# Patient Record
Sex: MALE | Race: WHITE | NOT HISPANIC OR LATINO | Employment: OTHER | URBAN - METROPOLITAN AREA
[De-identification: names, ages, dates, MRNs, and addresses within clinical notes are randomized per-mention and may not be internally consistent; named-entity substitution may affect disease eponyms.]

---

## 2017-06-14 ENCOUNTER — DOCUMENTATION ONLY (OUTPATIENT)
Dept: LAB | Facility: CLINIC | Age: 77
End: 2017-06-14

## 2017-06-14 DIAGNOSIS — I10 HYPERTENSION GOAL BP (BLOOD PRESSURE) < 140/90: ICD-10-CM

## 2017-06-14 DIAGNOSIS — Z80.42 FAMILY HISTORY OF PROSTATE CANCER: ICD-10-CM

## 2017-06-14 DIAGNOSIS — Z12.5 SPECIAL SCREENING FOR MALIGNANT NEOPLASM OF PROSTATE: ICD-10-CM

## 2017-06-14 DIAGNOSIS — N18.30 CKD (CHRONIC KIDNEY DISEASE) STAGE 3, GFR 30-59 ML/MIN (H): Primary | ICD-10-CM

## 2017-06-14 NOTE — PROGRESS NOTES
This patient has a future lab only appointment on 06/27/2017 and needs orders. Please sign the pended labs taken from the overdue  and make any needed changes. Thanks julia

## 2017-06-27 DIAGNOSIS — N18.30 CKD (CHRONIC KIDNEY DISEASE) STAGE 3, GFR 30-59 ML/MIN (H): ICD-10-CM

## 2017-06-27 DIAGNOSIS — I10 HYPERTENSION GOAL BP (BLOOD PRESSURE) < 140/90: ICD-10-CM

## 2017-06-27 DIAGNOSIS — Z80.42 FAMILY HISTORY OF PROSTATE CANCER: ICD-10-CM

## 2017-06-27 DIAGNOSIS — Z12.5 SPECIAL SCREENING FOR MALIGNANT NEOPLASM OF PROSTATE: ICD-10-CM

## 2017-06-27 PROCEDURE — G0103 PSA SCREENING: HCPCS | Performed by: FAMILY MEDICINE

## 2017-06-27 PROCEDURE — 36415 COLL VENOUS BLD VENIPUNCTURE: CPT | Performed by: FAMILY MEDICINE

## 2017-06-27 PROCEDURE — 80061 LIPID PANEL: CPT | Performed by: FAMILY MEDICINE

## 2017-06-27 PROCEDURE — 80048 BASIC METABOLIC PNL TOTAL CA: CPT | Performed by: FAMILY MEDICINE

## 2017-06-28 ENCOUNTER — TELEPHONE (OUTPATIENT)
Dept: FAMILY MEDICINE | Facility: CLINIC | Age: 77
End: 2017-06-28

## 2017-06-28 DIAGNOSIS — N18.30 CKD (CHRONIC KIDNEY DISEASE) STAGE 3, GFR 30-59 ML/MIN (H): Primary | ICD-10-CM

## 2017-06-28 LAB
ANION GAP SERPL CALCULATED.3IONS-SCNC: 6 MMOL/L (ref 3–14)
BUN SERPL-MCNC: 17 MG/DL (ref 7–30)
CALCIUM SERPL-MCNC: 8.9 MG/DL (ref 8.5–10.1)
CHLORIDE SERPL-SCNC: 104 MMOL/L (ref 94–109)
CHOLEST SERPL-MCNC: 179 MG/DL
CO2 SERPL-SCNC: 27 MMOL/L (ref 20–32)
CREAT SERPL-MCNC: 1.38 MG/DL (ref 0.66–1.25)
GFR SERPL CREATININE-BSD FRML MDRD: 50 ML/MIN/1.7M2
GLUCOSE SERPL-MCNC: 83 MG/DL (ref 70–99)
HDLC SERPL-MCNC: 71 MG/DL
LDLC SERPL CALC-MCNC: 96 MG/DL
NONHDLC SERPL-MCNC: 108 MG/DL
POTASSIUM SERPL-SCNC: 4.4 MMOL/L (ref 3.4–5.3)
PSA SERPL-ACNC: 0.62 UG/L (ref 0–4)
SODIUM SERPL-SCNC: 137 MMOL/L (ref 133–144)
TRIGL SERPL-MCNC: 60 MG/DL

## 2017-06-28 NOTE — TELEPHONE ENCOUNTER
There was a future order in orders pending for a BMP that did not get done yesterday. Please add to the labs that were done    Richmond Albarran MD

## 2017-07-06 ENCOUNTER — TRANSFERRED RECORDS (OUTPATIENT)
Dept: HEALTH INFORMATION MANAGEMENT | Facility: CLINIC | Age: 77
End: 2017-07-06

## 2017-10-06 ENCOUNTER — ALLIED HEALTH/NURSE VISIT (OUTPATIENT)
Dept: NURSING | Facility: CLINIC | Age: 77
End: 2017-10-06
Payer: COMMERCIAL

## 2017-10-06 DIAGNOSIS — Z23 NEED FOR PROPHYLACTIC VACCINATION AND INOCULATION AGAINST INFLUENZA: Primary | ICD-10-CM

## 2017-10-06 PROCEDURE — 99207 ZZC NO CHARGE NURSE ONLY: CPT

## 2017-10-06 PROCEDURE — 90662 IIV NO PRSV INCREASED AG IM: CPT

## 2017-10-06 PROCEDURE — G0008 ADMIN INFLUENZA VIRUS VAC: HCPCS

## 2017-10-06 NOTE — MR AVS SNAPSHOT
After Visit Summary   10/6/2017    Sami Harmon    MRN: [id number]           Patient Information     Date Of Birth          1940        Visit Information        Provider Department      10/6/2017 1:25 PM NE FLU CLINIC [facility]        Today's Diagnoses     Need for prophylactic vaccination and inoculation against influenza    -  1       Follow-ups after your visit        Who to contact     If you have questions or need follow up information about today's clinic visit or your schedule please contact [facility] directly at [phone].  Normal or non-critical lab and imaging results will be communicated to you by Level Chefhart, letter or phone within 4 business days after the clinic has received the results. If you do not hear from us within 7 days, please contact the clinic through Aspiring Mind[employer] or phone. If you have a critical or abnormal lab result, we will notify you by phone as soon as possible.  Submit refill requests through [email or web address] or call your pharmacy and they will forward the refill request to us. Please allow 3 business days for your refill to be completed.          Additional Information About Your Visit        MyChart Information     [email or web address] gives you secure access to your electronic health record. If you see a primary care provider, you can also send messages to your care team and make appointments. If you have questions, please call your primary care clinic.  If you do not have a primary care provider, please call [phone] and they will assist you.        Care EveryWhere ID     This is your Care EveryWhere ID. This could be used by other organizations to access your [facility] medical records  [id number]         Blood Pressure from Last 3 Encounters:   12/13/16 115/62   06/29/16 132/72   06/08/16 136/80    Weight from Last 3 Encounters:   12/13/16 247 lb (112 kg)   06/29/16 256 lb 2 oz (116.2 kg)   06/08/16 257 lb (116.6 kg)              We  Performed the Following     ADMIN INFLUENZA (For MEDICARE Patients ONLY) []     FLU VACCINE, INCREASED ANTIGEN, PRESV FREE, AGE 65+ [98347]        Primary Care Provider Office Phone # Fax #    Clemente Albarran [phone] [address] [facility] [address]        Equal Access to Services     BALDEMAR PEDERSON : Hadii aad ku hadasho Soomaali, waaxda luqadaha, qaybta kaalmada adeegyada, waxjosefina vila hayaan adealfredo kharabri riggs . So [facility] [phone].    ATENCIÓN: Si habla español, tiene a salazar disposición servicios gratuitos de asistencia lingüística. Llame al [phone].    We comply with applicable federal civil rights laws and [place] laws. We do not discriminate on the basis of race, color, national origin, age, disability, sex, sexual orientation, or gender identity.            Thank you!     Thank you for choosing [facility]  for your care. Our goal is always to provide you with excellent care. Hearing back from our patients is one way we can continue to improve our services. Please take a few minutes to complete the written survey that you may receive in the mail after your visit with us. Thank you!             Your Updated Medication List - Protect others around you: Learn how to safely use, store and throw away your medicines at www.disposemymeds.org.          This list is accurate as of: 10/6/17  1:28 PM.  Always use your most recent med list.                   Brand Name Dispense Instructions for use Diagnosis    aspirin 81 MG tablet      1 TABLET DAILY        IRON SUPPLEMENT PO      Take by mouth daily        losartan 100 MG tablet    COZAAR    90 tablet    Take 1 tablet (100 mg) by mouth daily    CKD (chronic kidney disease) stage 3, GFR 30-59 ml/min, Hypertension goal BP (blood pressure) < 140/90       lovastatin 40 MG tablet    MEVACOR    90 tablet    Take 1 tablet (40 mg) by mouth At Bedtime    Hyperlipidemia LDL goal <100       Lutein 6 MG Caps       take  by mouth.        METAMUCIL PO      Take by mouth daily 1 teaspoon added to 1 glass of water once daily        MULTI-VITAMIN PO      1 tablets  daily        omega 3 1000 MG Caps      take 1 capsule by mouth 2 times daily.

## 2017-10-06 NOTE — PROGRESS NOTES
Injectable Influenza Immunization Documentation    1.  Is the person to be vaccinated sick today?   No    2. Does the person to be vaccinated have an allergy to a component   of the vaccine?   No    3. Has the person to be vaccinated ever had a serious reaction   to influenza vaccine in the past?   No    4. Has the person to be vaccinated ever had Guillain-Barré syndrome?   No    Form completed by Love Saleem CMA ([facility])

## 2017-10-06 NOTE — NURSING NOTE
Prior to injection verified patient identity using patient's name and date of birth.    Love Saleem CMA ([facility])

## 2017-11-08 ENCOUNTER — TRANSFERRED RECORDS (OUTPATIENT)
Dept: HEALTH INFORMATION MANAGEMENT | Facility: CLINIC | Age: 77
End: 2017-11-08

## 2017-11-16 ENCOUNTER — DOCUMENTATION ONLY (OUTPATIENT)
Dept: LAB | Facility: CLINIC | Age: 77
End: 2017-11-16

## 2017-11-16 DIAGNOSIS — N18.30 CKD (CHRONIC KIDNEY DISEASE) STAGE 3, GFR 30-59 ML/MIN (H): Primary | ICD-10-CM

## 2017-11-16 DIAGNOSIS — I10 HYPERTENSION GOAL BP (BLOOD PRESSURE) < 140/90: ICD-10-CM

## 2017-11-16 NOTE — PROGRESS NOTES
This patient has a future lab only appointment on 12/07/2017 and needs orders. Please sign the pended labs taken from the overdue  and make any needed changes. Thanks julia

## 2017-12-07 DIAGNOSIS — N18.30 CKD (CHRONIC KIDNEY DISEASE) STAGE 3, GFR 30-59 ML/MIN (H): ICD-10-CM

## 2017-12-07 DIAGNOSIS — I10 HYPERTENSION GOAL BP (BLOOD PRESSURE) < 140/90: ICD-10-CM

## 2017-12-07 LAB
ANION GAP SERPL CALCULATED.3IONS-SCNC: 6 MMOL/L (ref 3–14)
BUN SERPL-MCNC: 14 MG/DL (ref 7–30)
CALCIUM SERPL-MCNC: 9 MG/DL (ref 8.5–10.1)
CHLORIDE SERPL-SCNC: 101 MMOL/L (ref 94–109)
CO2 SERPL-SCNC: 30 MMOL/L (ref 20–32)
CREAT SERPL-MCNC: 1.36 MG/DL (ref 0.66–1.25)
CREAT UR-MCNC: 65 MG/DL
GFR SERPL CREATININE-BSD FRML MDRD: 51 ML/MIN/1.7M2
GLUCOSE SERPL-MCNC: 82 MG/DL (ref 70–99)
HGB BLD-MCNC: 11.9 G/DL (ref 13.3–17.7)
MICROALBUMIN UR-MCNC: <5 MG/L
MICROALBUMIN/CREAT UR: NORMAL MG/G CR (ref 0–17)
POTASSIUM SERPL-SCNC: 4.2 MMOL/L (ref 3.4–5.3)
SODIUM SERPL-SCNC: 137 MMOL/L (ref 133–144)

## 2017-12-07 PROCEDURE — 85018 HEMOGLOBIN: CPT | Performed by: FAMILY MEDICINE

## 2017-12-07 PROCEDURE — 82043 UR ALBUMIN QUANTITATIVE: CPT | Performed by: FAMILY MEDICINE

## 2017-12-07 PROCEDURE — 80048 BASIC METABOLIC PNL TOTAL CA: CPT | Performed by: FAMILY MEDICINE

## 2017-12-07 PROCEDURE — 36415 COLL VENOUS BLD VENIPUNCTURE: CPT | Performed by: FAMILY MEDICINE

## 2017-12-14 ENCOUNTER — OFFICE VISIT (OUTPATIENT)
Dept: FAMILY MEDICINE | Facility: CLINIC | Age: 77
End: 2017-12-14
Payer: COMMERCIAL

## 2017-12-14 VITALS
BODY MASS INDEX: 33.32 KG/M2 | SYSTOLIC BLOOD PRESSURE: 114 MMHG | DIASTOLIC BLOOD PRESSURE: 70 MMHG | TEMPERATURE: 97.7 F | HEART RATE: 64 BPM | WEIGHT: 246 LBS | HEIGHT: 72 IN

## 2017-12-14 DIAGNOSIS — I10 HYPERTENSION GOAL BP (BLOOD PRESSURE) < 140/90: ICD-10-CM

## 2017-12-14 DIAGNOSIS — N18.30 CKD (CHRONIC KIDNEY DISEASE) STAGE 3, GFR 30-59 ML/MIN (H): ICD-10-CM

## 2017-12-14 DIAGNOSIS — Z00.00 ROUTINE HISTORY AND PHYSICAL EXAMINATION OF ADULT: Primary | ICD-10-CM

## 2017-12-14 DIAGNOSIS — E78.5 HYPERLIPIDEMIA LDL GOAL <100: ICD-10-CM

## 2017-12-14 PROCEDURE — 99397 PER PM REEVAL EST PAT 65+ YR: CPT | Performed by: FAMILY MEDICINE

## 2017-12-14 RX ORDER — LOSARTAN POTASSIUM 100 MG/1
100 TABLET ORAL DAILY
Qty: 90 TABLET | Refills: 3 | Status: SHIPPED | OUTPATIENT
Start: 2017-12-14 | End: 2018-12-31

## 2017-12-14 RX ORDER — LOVASTATIN 40 MG
40 TABLET ORAL AT BEDTIME
Qty: 90 TABLET | Refills: 3 | Status: SHIPPED | OUTPATIENT
Start: 2017-12-14 | End: 2018-12-31

## 2017-12-14 NOTE — PATIENT INSTRUCTIONS
Preventive Health Recommendations:       Male Ages 65 and over    Yearly exam:             See your health care provider every year in order to  o   Review health changes.   o   Discuss preventive care.    o   Review your medicines if your doctor has prescribed any.    Talk with your health care provider about whether you should have a test to screen for prostate cancer (PSA).    Every 3 years, have a diabetes test (fasting glucose). If you are at risk for diabetes, you should have this test more often.    Every 5 years, have a cholesterol test. Have this test more often if you are at risk for high cholesterol or heart disease.     Every 10 years, have a colonoscopy. Or, have a yearly FIT test (stool test). These exams will check for colon cancer.    Talk to with your health care provider about screening for Abdominal Aortic Aneurysm if you have a family history of AAA or have a history of smoking.  Shots:     Get a flu shot each year.     Get a tetanus shot every 10 years.     Talk to your doctor about your pneumonia vaccines. There are now two you should receive - Pneumovax (PPSV 23) and Prevnar (PCV 13).    Talk to your doctor about a shingles vaccine.     Talk to your doctor about the hepatitis B vaccine.  Nutrition:     Eat at least 5 servings of fruits and vegetables each day.     Eat whole-grain bread, whole-wheat pasta and brown rice instead of white grains and rice.     Talk to your doctor about Calcium and Vitamin D.   Lifestyle    Exercise for at least 150 minutes a week (30 minutes a day, 5 days a week). This will help you control your weight and prevent disease.     Limit alcohol to one drink per day.     No smoking.     Wear sunscreen to prevent skin cancer.     See your dentist every six months for an exam and cleaning.     See your eye doctor every 1 to 2 years to screen for conditions such as glaucoma, macular degeneration and cataracts.    -There's a new shingles vaccine coming out and you should  get it when available. You should check to see if this new vaccine is covered by insurance. You can get it at any pharmacy including ours.

## 2017-12-14 NOTE — PROGRESS NOTES
SUBJECTIVE:   Sami Harmon is a 77 year old male who presents for Preventive Visit.      Are you in the first 12 months of your Medicare coverage?  No    Physical   Annual:     Getting at least 3 servings of Calcium per day::  Yes    Bi-annual eye exam::  Yes    Dental care twice a year::  Yes    Sleep apnea or symptoms of sleep apnea::  None    Frequency of exercise::  4-5 days/week    Duration of exercise::  Greater than 60 minutes    Taking medications regularly::  Yes    Medication side effects::  None    Additional concerns today::  YES              Ability to successfully perform activities of daily living: Yes, no assistance needed  Home safety:  none identified   Hearing impairment: Yes, Hearing aid in left ear, surgery in 2002      Recent blood workup reviewed with patient. Patient concerned about 0.4 point drop of Hgb from 12.3 (12/07/2016)  down to 11.9        Past/recent records reviewed and discussed for -- immunizations (shingles).      Fall risk:  Fallen 2 or more times in the past year?: No  Any fall with injury in the past year?: No    click delete button to remove this line now  COGNITIVE SCREEN  1) Repeat 3 items (Banana, Sunrise, Chair)    2) Clock draw: NORMAL  3) 3 item recall: Recalls 3 objects  Results: 3 items recalled: COGNITIVE IMPAIRMENT LESS LIKELY    Mini-CogTM Copyright S Siva. Licensed by the author for use in [facility]; reprinted with permission (janeth@.[email or web address]). All rights reserved.          Reviewed and updated as needed this visit by clinical staffTobacco  Allergies  Meds  Med Hx  Surg Hx  Fam Hx  Soc Hx        Reviewed and updated as needed this visit by Provider        Social History   Substance Use Topics     Smoking status: Passive Smoke Exposure - Never Smoker     Years: 30.00     Types: Dip, chew, snus or snuff     Start date: 6/1/1960     Last attempt to quit: 8/1/1990     Smokeless tobacco: Former User      Comment: 1 cigar daily     Alcohol use Yes       Comment: 1 drink per day       No flowsheet data found.No flowsheet data found.                Today's PHQ-2 Score: PHQ-2 ( 1999 Pfizer) 12/14/2017   Q1: Little interest or pleasure in doing things 0   Q2: Feeling down, depressed or hopeless 0   PHQ-2 Score 0   Q1: Little interest or pleasure in doing things -   Q2: Feeling down, depressed or hopeless -   PHQ-2 Score -       Do you feel safe in your environment - Yes    Do you have a Health Care Directive?: Yes: Advance Directive has been received and scanned.    Current providers sharing in care for this patient include:   Patient Care Team:  Clemente Albarran MD as PCP - General (Family Practice)    The following health maintenance items are reviewed in Epic and correct as of today:  Health Maintenance   Topic Date Due     FALL RISK ASSESSMENT  12/13/2017     LIPID MONITORING Q1 YEAR  06/27/2018     PSA Q1 YR  06/27/2018     BMP Q1 YR  12/07/2018     HEMOGLOBIN Q1 YR  12/07/2018     MICROALBUMIN Q1 YEAR  12/07/2018     ADVANCE DIRECTIVE PLANNING Q5 YRS  07/30/2019     TETANUS IMMUNIZATION (SYSTEM ASSIGNED)  10/10/2022     COLONOSCOPY Q5 YR  11/08/2022     INFLUENZA VACCINE (SYSTEM ASSIGNED)  Completed     PNEUMOCOCCAL  Completed     Labs reviewed in EPIC  BP Readings from Last 3 Encounters:   12/14/17 114/70   12/13/16 115/62   06/29/16 132/72    Wt Readings from Last 3 Encounters:   12/14/17 111.6 kg (246 lb)   12/13/16 112 kg (247 lb)   06/29/16 116.2 kg (256 lb 2 oz)                  Patient Active Problem List   Diagnosis     Family history of prostate cancer     Hyperlipidemia LDL goal <100     CKD (chronic kidney disease) stage 3, GFR 30-59 ml/min     Advance care planning     Primary localized osteoarthrosis, pelvic region and thigh     History of colon polyps     Advanced directives, counseling/discussion     Hypertension goal BP (blood pressure) < 140/90     Overweight     Past Surgical History:   Procedure Laterality Date     ABDOMEN SURGERY   1970's, 1980's    Two hernia surgerys     BIOPSY  July 2016    polyp removed from vocal cord - benign     COLONOSCOPY       COSMETIC SURGERY       EYE SURGERY  2000    Macular scar, presently being treated with avastin     HERNIA REPAIR, INGUINAL RT/LT  1970,1980     ORTHOPEDIC SURGERY  April 2015, June 2015    Right hip replacement, Left hip replacement     SURGICAL HISTORY OF -   2000    cholesteatoma removal L     SURGICAL HISTORY OF -   3/2004    laser macular     SURGICAL HISTORY OF -   4-2015    right hip replacement     TONSILLECTOMY & ADENOIDECTOMY  1961       Social History   Substance Use Topics     Smoking status: Passive Smoke Exposure - Never Smoker     Years: 30.00     Types: Dip, chew, snus or snuff     Start date: 6/1/1960     Last attempt to quit: 8/1/1990     Smokeless tobacco: Former User      Comment: 1 cigar daily     Alcohol use Yes      Comment: 1 drink per day     Family History   Problem Relation Age of Onset     Arthritis Mother      Eye Disorder Mother      DIABETES Mother      type 2     Hypertension Mother      Cancer - colorectal Mother      CEREBROVASCULAR DISEASE Mother      OSTEOPOROSIS Mother      Obesity Mother      Colon Cancer Mother      HEART DISEASE Father      Prostate Cancer Father      CEREBROVASCULAR DISEASE Father      HEART DISEASE Brother      Prostate Cancer Brother      GASTROINTESTINAL DISEASE Son          Current Outpatient Prescriptions   Medication Sig Dispense Refill     lovastatin (MEVACOR) 40 MG tablet Take 1 tablet (40 mg) by mouth At Bedtime Refill when requested 90 tablet 3     losartan (COZAAR) 100 MG tablet Take 1 tablet (100 mg) by mouth daily Refill when requested 90 tablet 3     Ferrous Sulfate (IRON SUPPLEMENT PO) Take by mouth daily        Psyllium (METAMUCIL PO) Take by mouth daily 1 teaspoon added to 1 glass of water once daily       LUTEIN 6 MG CAPS take  by mouth.       OMEGA 3 1000 MG OR CAPS take 1 capsule by mouth 2 times daily.       ASPIRIN  "81 MG OR TABS 1 TABLET DAILY       MULTI-VITAMIN OR 1 tablets  daily       [DISCONTINUED] lovastatin (MEVACOR) 40 MG tablet Take 1 tablet (40 mg) by mouth At Bedtime 90 tablet 3     [DISCONTINUED] losartan (COZAAR) 100 MG tablet Take 1 tablet (100 mg) by mouth daily 90 tablet 3     No Known Allergies  Recent Labs   Lab Test  12/07/17   0745  06/27/17   0743   05/18/16   0841   04/01/15   0902   09/28/11   0824   09/15/10   1159   LDL   --   96   --   81   --   73   < >   --    < >  75   HDL   --   71   --   64   --   74   < >   --    < >  45   TRIG   --   60   --   56   --   48   < >   --    < >  65   ALT   --    --    --    --    --    --    --   21   --   23   CR  1.36*  1.38*   < >  1.49*   < >  1.31*   < >   --    < >  1.24   GFRESTIMATED  51*  50*   < >  46*   < >  53*   < >   --    < >  57*   GFRESTBLACK  61  60*   < >  55*   < >  64   < >   --    < >  70   POTASSIUM  4.2  4.4   < >  4.4   < >  4.6   < >   --    < >  4.2    < > = values in this interval not displayed.              Review of Systems  Constitutional, HEENT, cardiovascular, pulmonary, GI, , musculoskeletal, neuro, skin, endocrine and psych systems are negative, except as otherwise noted.    This document serves as a record of the services and decisions personally performed and made by Richmond Albarran MD. It was created on their behalf by Dada Coto, a trained medical scribe. The creation of this document is based the provider's statements to the medical scribe.  Dada Coto December 14, 2017 12:34 PM         OBJECTIVE:   /70 (Cuff Size: Adult Large)  Pulse 64  Temp 97.7  F (36.5  C) (Oral)  Ht 5' 11.5\" (1.816 m)  Wt 246 lb (111.6 kg)  BMI 33.83 kg/m2 Estimated body mass index is 33.83 kg/(m^2) as calculated from the following:    Height as of this encounter: 5' 11.5\" (1.816 m).    Weight as of this encounter: 246 lb (111.6 kg).  Physical Exam  GENERAL: healthy, alert and no distress  EYES: Eyes grossly normal to inspection, PERRL and " conjunctivae and sclerae normal  HENT: ear canals and TM's normal, nose and mouth without ulcers or lesions  NECK: no adenopathy, no asymmetry, masses, or scars and thyroid normal to palpation  RESP: lungs clear to auscultation - no rales, rhonchi or wheezes  CV: regular rate and rhythm, normal S1 S2, no S3 or S4, no murmur, click or rub, no peripheral edema and peripheral pulses strong  ABDOMEN: soft, nontender, no hepatosplenomegaly, no masses and bowel sounds normal  MS: no gross musculoskeletal defects noted, no edema  SKIN: no suspicious lesions or rashes -- small skin lesion on right forearm, treated with LNx3  NEURO: Normal strength and tone, mentation intact and speech normal  PSYCH: mentation appears normal, affect normal/bright  LYMPH: no cervical, supraclavicular, axillary, or inguinal adenopathy    ASSESSMENT / PLAN:   (Z00.00) Routine history and physical examination of adult  (primary encounter diagnosis)  Comment: Patient doing well, no complaints verbalized. Screening recommendations related to health maintenance, age demographics, and family history were discussed  Plan: Recommended getting new shingles vaccine when available.     (E78.5) Hyperlipidemia LDL goal <100  Comment: previous LDL of 96; controlled  Plan: lovastatin (MEVACOR) 40 MG tablet        -medications refilled, continue present medications    (N18.3) CKD (chronic kidney disease) stage 3, GFR 30-59 ml/min  Comment: Cr of 1.36; stable. Pt concerned about mild anemia with Hgb of 11.9, most likely related to decreased EPO due to overlying CKD. Reassurance provided.   Plan: losartan (COZAAR) 100 MG tablet        -medications refilled, continue present medications    (I10) Hypertension goal BP (blood pressure) < 140/90  Comment: BP of 114/70; controlled  Plan: losartan (COZAAR) 100 MG tablet        -medications refilled, continue present medications      End of Life Planning:  Patient currently has an advanced directive: not discussed  "today    COUNSELING:  Reviewed preventive health counseling, as reflected in patient instructions       Regular exercise       Healthy diet/nutrition       Shingles vaccine        Estimated body mass index is 33.83 kg/(m^2) as calculated from the following:    Height as of this encounter: 5' 11.5\" (1.816 m).    Weight as of this encounter: 246 lb (111.6 kg).  Weight management plan: Discussed healthy diet and exercise guidelines and patient will follow up in 12 months in clinic to re-evaluate.   reports that he is a non-smoker but has been exposed to tobacco smoke. The exposure started about 57 years ago. He has been exposed to tobacco smoke for the past 30.00 years. He has quit using smokeless tobacco.      Appropriate preventive services were discussed with this patient, including applicable screening as appropriate for cardiovascular disease, diabetes, osteopenia/osteoporosis, and glaucoma.  As appropriate for age/gender, discussed screening for colorectal cancer, prostate cancer, breast cancer, and cervical cancer. Checklist reviewing preventive services available has been given to the patient.    Reviewed patients plan of care and provided an AVS. The Basic Care Plan (routine screening as documented in Health Maintenance) for Sami meets the Care Plan requirement. This Care Plan has been established and reviewed with the Patient.    Counseling Resources:  ATP IV Guidelines  Pooled Cohorts Equation Calculator  Breast Cancer Risk Calculator  FRAX Risk Assessment  ICSI Preventive Guidelines  Dietary Guidelines for Americans, 2010  USDA's MyPlate  ASA Prophylaxis  Lung CA Screening    Clemente Albarran MD, MD  [facility]  Answers for HPI/ROS submitted by the patient on 12/11/2017   PHQ-2 Score: 0    "

## 2017-12-14 NOTE — MR AVS SNAPSHOT
After Visit Summary   12/14/2017    Sami Harmon    MRN: [id number]           Patient Information     Date Of Birth          1940        Visit Information        Provider Department      12/14/2017 11:20 AM Clemente Albarran MD [facility]        Today's Diagnoses     Routine history and physical examination of adult    -  1    Hyperlipidemia LDL goal <100        CKD (chronic kidney disease) stage 3, GFR 30-59 ml/min        Hypertension goal BP (blood pressure) < 140/90          Care Instructions      Preventive Health Recommendations:       Male Ages 65 and over    Yearly exam:             See your health care provider every year in order to  o   Review health changes.   o   Discuss preventive care.    o   Review your medicines if your doctor has prescribed any.    Talk with your health care provider about whether you should have a test to screen for prostate cancer (PSA).    Every 3 years, have a diabetes test (fasting glucose). If you are at risk for diabetes, you should have this test more often.    Every 5 years, have a cholesterol test. Have this test more often if you are at risk for high cholesterol or heart disease.     Every 10 years, have a colonoscopy. Or, have a yearly FIT test (stool test). These exams will check for colon cancer.    Talk to with your health care provider about screening for Abdominal Aortic Aneurysm if you have a family history of AAA or have a history of smoking.  Shots:     Get a flu shot each year.     Get a tetanus shot every 10 years.     Talk to your doctor about your pneumonia vaccines. There are now two you should receive - Pneumovax (PPSV 23) and Prevnar (PCV 13).    Talk to your doctor about a shingles vaccine.     Talk to your doctor about the hepatitis B vaccine.  Nutrition:     Eat at least 5 servings of fruits and vegetables each day.     Eat whole-grain bread, whole-wheat pasta and brown rice instead of white grains and rice.      Talk to your doctor about Calcium and Vitamin D.   Lifestyle    Exercise for at least 150 minutes a week (30 minutes a day, 5 days a week). This will help you control your weight and prevent disease.     Limit alcohol to one drink per day.     No smoking.     Wear sunscreen to prevent skin cancer.     See your dentist every six months for an exam and cleaning.     See your eye doctor every 1 to 2 years to screen for conditions such as glaucoma, macular degeneration and cataracts.    -There's a new shingles vaccine coming out and you should get it when available. You should check to see if this new vaccine is covered by insurance. You can get it at any pharmacy including ours.           Follow-ups after your visit        Who to contact     If you have questions or need follow up information about today's clinic visit or your schedule please contact [facility] directly at [phone].  Normal or non-critical lab and imaging results will be communicated to you by MyChart, letter or phone within 4 business days after the clinic has received the results. If you do not hear from us within 7 days, please contact the clinic through [email or web address] or phone. If you have a critical or abnormal lab result, we will notify you by phone as soon as possible.  Submit refill requests through [employer] or call your pharmacy and they will forward the refill request to us. Please allow 3 business days for your refill to be completed.          Additional Information About Your Visit        MyChart Information     [employer] gives you secure access to your electronic health record. If you see a primary care provider, you can also send messages to your care team and make appointments. If you have questions, please call your primary care clinic.  If you do not have a primary care provider, please call [phone] and they will assist you.        Care EveryWhere ID     This is your Care EveryWhere ID. This could be used by other  "organizations to access your [facility] medical records  [id number]        Your Vitals Were     Pulse Temperature Height BMI (Body Mass Index)          64 97.7  F (36.5  C) (Oral) 5' 11.5\" (1.816 m) 33.83 kg/m2         Blood Pressure from Last 3 Encounters:   12/14/17 114/70   12/13/16 115/62   06/29/16 132/72    Weight from Last 3 Encounters:   12/14/17 246 lb (111.6 kg)   12/13/16 247 lb (112 kg)   06/29/16 256 lb 2 oz (116.2 kg)              Today, you had the following     No orders found for display         Today's Medication Changes          These changes are accurate as of: 12/14/17  2:45 PM.  If you have any questions, ask your nurse or doctor.               These medicines have changed or have updated prescriptions.        Dose/Directions    losartan 100 MG tablet   Commonly known as:  COZAAR   This may have changed:  additional instructions   Used for:  CKD (chronic kidney disease) stage 3, GFR 30-59 ml/min, Hypertension goal BP (blood pressure) < 140/90   Changed by:  Clemente Albarran MD        Dose:  100 mg   Take 1 tablet (100 mg) by mouth daily Refill when requested   Quantity:  90 tablet   Refills:  3       lovastatin 40 MG tablet   Commonly known as:  MEVACOR   This may have changed:  additional instructions   Used for:  Hyperlipidemia LDL goal <100   Changed by:  Clemente Albarran MD        Dose:  40 mg   Take 1 tablet (40 mg) by mouth At Bedtime Refill when requested   Quantity:  90 tablet   Refills:  3            Where to get your medicines      These medications were sent to [facility] [place] [id number]     Phone:  [phone]     losartan 100 MG tablet    lovastatin 40 MG tablet                Primary Care Provider Office Phone # Fax #    Clemente Albarran [phone]       [address] [place] [address]        Equal Access to Services     BALDEMAR PEDERSON AH: Maurice islas " garrick Yang, waleliada luqadaha, qaybta kaalmada flacohever, waxjosefina marcellusin hayaarhina samalfredo quocever keely martina. So [facility] [phone].    ATENCIÓN: Si habla aleshia, tiene a salazar disposición servicios gratuitos de asistencia lingüística. Gigi al [phone].    We comply with applicable federal civil rights laws and [place] laws. We do not discriminate on the basis of race, color, national origin, age, disability, sex, sexual orientation, or gender identity.            Thank you!     Thank you for choosing [facility]  for your care. Our goal is always to provide you with excellent care. Hearing back from our patients is one way we can continue to improve our services. Please take a few minutes to complete the written survey that you may receive in the mail after your visit with us. Thank you!             Your Updated Medication List - Protect others around you: Learn how to safely use, store and throw away your medicines at www.disposemymeds.org.          This list is accurate as of: 12/14/17  2:45 PM.  Always use your most recent med list.                   Brand Name Dispense Instructions for use Diagnosis    aspirin 81 MG tablet      1 TABLET DAILY        IRON SUPPLEMENT PO      Take by mouth daily        losartan 100 MG tablet    COZAAR    90 tablet    Take 1 tablet (100 mg) by mouth daily Refill when requested    CKD (chronic kidney disease) stage 3, GFR 30-59 ml/min, Hypertension goal BP (blood pressure) < 140/90       lovastatin 40 MG tablet    MEVACOR    90 tablet    Take 1 tablet (40 mg) by mouth At Bedtime Refill when requested    Hyperlipidemia LDL goal <100       Lutein 6 MG Caps      take  by mouth.        METAMUCIL PO      Take by mouth daily 1 teaspoon added to 1 glass of water once daily        MULTI-VITAMIN PO      1 tablets  daily        omega 3 1000 MG Caps      take 1 capsule by mouth 2 times daily.

## 2018-06-13 ENCOUNTER — DOCUMENTATION ONLY (OUTPATIENT)
Dept: LAB | Facility: CLINIC | Age: 78
End: 2018-06-13

## 2018-06-13 DIAGNOSIS — Z80.42 FAMILY HISTORY OF PROSTATE CANCER: Primary | ICD-10-CM

## 2018-06-13 DIAGNOSIS — I10 HYPERTENSION GOAL BP (BLOOD PRESSURE) < 140/90: ICD-10-CM

## 2018-06-13 DIAGNOSIS — E78.5 HYPERLIPIDEMIA LDL GOAL <100: ICD-10-CM

## 2018-06-13 DIAGNOSIS — N18.30 CKD (CHRONIC KIDNEY DISEASE) STAGE 3, GFR 30-59 ML/MIN (H): ICD-10-CM

## 2018-06-13 DIAGNOSIS — Z12.5 SPECIAL SCREENING FOR MALIGNANT NEOPLASM OF PROSTATE: ICD-10-CM

## 2018-06-13 NOTE — PROGRESS NOTES
This patient has a future lab only appointment on 06/21/2017 and needs orders. Please sign the pended labs taken from the overdue  and make any needed changes. Thanks julia

## 2018-06-21 DIAGNOSIS — Z12.5 SPECIAL SCREENING FOR MALIGNANT NEOPLASM OF PROSTATE: ICD-10-CM

## 2018-06-21 DIAGNOSIS — Z80.42 FAMILY HISTORY OF PROSTATE CANCER: ICD-10-CM

## 2018-06-21 DIAGNOSIS — E78.5 HYPERLIPIDEMIA LDL GOAL <100: ICD-10-CM

## 2018-06-21 DIAGNOSIS — I10 HYPERTENSION GOAL BP (BLOOD PRESSURE) < 140/90: ICD-10-CM

## 2018-06-21 DIAGNOSIS — N18.30 CKD (CHRONIC KIDNEY DISEASE) STAGE 3, GFR 30-59 ML/MIN (H): ICD-10-CM

## 2018-06-21 LAB
ANION GAP SERPL CALCULATED.3IONS-SCNC: 9 MMOL/L (ref 3–14)
BUN SERPL-MCNC: 16 MG/DL (ref 7–30)
CALCIUM SERPL-MCNC: 8.8 MG/DL (ref 8.5–10.1)
CHLORIDE SERPL-SCNC: 103 MMOL/L (ref 94–109)
CHOLEST SERPL-MCNC: 165 MG/DL
CO2 SERPL-SCNC: 25 MMOL/L (ref 20–32)
CREAT SERPL-MCNC: 1.35 MG/DL (ref 0.66–1.25)
GFR SERPL CREATININE-BSD FRML MDRD: 51 ML/MIN/1.7M2
GLUCOSE SERPL-MCNC: 86 MG/DL (ref 70–99)
HDLC SERPL-MCNC: 70 MG/DL
LDLC SERPL CALC-MCNC: 86 MG/DL
NONHDLC SERPL-MCNC: 95 MG/DL
POTASSIUM SERPL-SCNC: 4.2 MMOL/L (ref 3.4–5.3)
PSA SERPL-ACNC: 0.84 UG/L (ref 0–4)
SODIUM SERPL-SCNC: 137 MMOL/L (ref 133–144)
TRIGL SERPL-MCNC: 45 MG/DL

## 2018-06-21 PROCEDURE — 80061 LIPID PANEL: CPT | Performed by: FAMILY MEDICINE

## 2018-06-21 PROCEDURE — G0103 PSA SCREENING: HCPCS | Performed by: FAMILY MEDICINE

## 2018-06-21 PROCEDURE — 36415 COLL VENOUS BLD VENIPUNCTURE: CPT | Performed by: FAMILY MEDICINE

## 2018-06-21 PROCEDURE — 80048 BASIC METABOLIC PNL TOTAL CA: CPT | Performed by: FAMILY MEDICINE

## 2018-10-17 ENCOUNTER — ALLIED HEALTH/NURSE VISIT (OUTPATIENT)
Dept: NURSING | Facility: CLINIC | Age: 78
End: 2018-10-17
Payer: COMMERCIAL

## 2018-10-17 DIAGNOSIS — Z23 NEED FOR PROPHYLACTIC VACCINATION AND INOCULATION AGAINST INFLUENZA: Primary | ICD-10-CM

## 2018-10-17 PROCEDURE — 99207 ZZC NO CHARGE NURSE ONLY: CPT

## 2018-10-17 PROCEDURE — 90662 IIV NO PRSV INCREASED AG IM: CPT

## 2018-10-17 PROCEDURE — G0008 ADMIN INFLUENZA VIRUS VAC: HCPCS

## 2018-10-17 NOTE — MR AVS SNAPSHOT
After Visit Summary   10/17/2018    Sami Harmon    MRN: [id number]           Patient Information     Date Of Birth          1940        Visit Information        Provider Department      10/17/2018 10:30 AM NE FLU CLINIC [facility]        Today's Diagnoses     Need for prophylactic vaccination and inoculation against influenza    -  1       Follow-ups after your visit        Your next 10 appointments already scheduled     Oct 17, 2018 10:30 AM CDT   Nurse Only with NE FLU [facility] ([facility])    [address] [place] [id number] [phone]              Who to contact     If you have questions or need follow up information about today's clinic visit or your schedule please contact [facility] directly at [phone].  Normal or non-critical lab and imaging results will be communicated to you by MyChart, letter or phone within 4 business days after the clinic has received the results. If you do not hear from us within 7 days, please contact the clinic through MyChart or phone. If you have a critical or abnormal lab result, we will notify you by phone as soon as possible.  Submit refill requests through [employer] or call your pharmacy and they will forward the refill request to us. Please allow 3 business days for your refill to be completed.          Additional Information About Your Visit        MyChart Information     [employer] gives you secure access to your electronic health record. If you see a primary care provider, you can also send messages to your care team and make appointments. If you have questions, please call your primary care clinic.  If you do not have a primary care provider, please call [phone] and they will assist you.        Care EveryWhere ID     This is your Care EveryWhere ID. This could be used by other organizations to access your [facility]  records  [id number]         Blood Pressure from Last 3 Encounters:   12/14/17 114/70   12/13/16 115/62   06/29/16 132/72    Weight from Last 3 Encounters:   12/14/17 246 lb (111.6 kg)   12/13/16 247 lb (112 kg)   06/29/16 256 lb 2 oz (116.2 kg)              We Performed the Following     ADMIN INFLUENZA (For MEDICARE Patients ONLY) []     FLU VACCINE, INCREASED ANTIGEN, PRESV FREE, AGE 65+ [20915]        Primary Care Provider Office Phone # Fax #    Clemente Samir Albarran [phone]       [address] [facility] [address]        Equal Access to Services     BALDEMAR PEDERSON : Maurice Yang, wadevi arriaga, qaybta kaalmada karuna, vandana mack. So [facility] [phone].    ATENCIÓN: Si habla español, tiene a salazar disposición servicios gratuitos de asistencia lingüística. Llame al [phone].    We comply with applicable federal civil rights laws and [place] laws. We do not discriminate on the basis of race, color, national origin, age, disability, sex, sexual orientation, or gender identity.            Thank you!     Thank you for choosing [facility]  for your care. Our goal is always to provide you with excellent care. Hearing back from our patients is one way we can continue to improve our services. Please take a few minutes to complete the written survey that you may receive in the mail after your visit with us. Thank you!             Your Updated Medication List - Protect others around you: Learn how to safely use, store and throw away your medicines at www.disposemymeds.org.          This list is accurate as of 10/17/18 10:26 AM.  Always use your most recent med list.                   Brand Name Dispense Instructions for use Diagnosis    aspirin 81 MG tablet      1 TABLET DAILY        IRON SUPPLEMENT PO      Take by mouth daily        losartan 100 MG tablet    COZAAR    90 tablet    Take 1 tablet (100 mg) by mouth  daily Refill when requested    CKD (chronic kidney disease) stage 3, GFR 30-59 ml/min (H), Hypertension goal BP (blood pressure) < 140/90       lovastatin 40 MG tablet    MEVACOR    90 tablet    Take 1 tablet (40 mg) by mouth At Bedtime Refill when requested    Hyperlipidemia LDL goal <100       Lutein 6 MG Caps      take  by mouth.        METAMUCIL PO      Take by mouth daily 1 teaspoon added to 1 glass of water once daily        MULTI-VITAMIN PO      1 tablets  daily        omega 3 1000 MG Caps      take 1 capsule by mouth 2 times daily.

## 2018-12-09 ENCOUNTER — MYC MEDICAL ADVICE (OUTPATIENT)
Dept: FAMILY MEDICINE | Facility: CLINIC | Age: 78
End: 2018-12-09

## 2018-12-10 ENCOUNTER — TELEPHONE (OUTPATIENT)
Dept: FAMILY MEDICINE | Facility: CLINIC | Age: 78
End: 2018-12-10

## 2018-12-10 NOTE — TELEPHONE ENCOUNTER
Sami Harmon is a 78 year old male who calls with heart rate problems.    NURSING ASSESSMENT:  Description:  Takes BP regularly, and machine will tell him when his rate is irregular.  He has had this in the past, but has been occurring more frequently lately.  He has had EKG and workup in the past.  He reports when occurs, can feel it in his pulse, but denies palpitations/fluttering, SOB, chest pain, dizziness, etc.  He denies very high or very low heart rate, just notices the rhythm irregularity. He has been exercising and able to perform his normal routines.  The episodes seem to be random with nothing really improving or worsening symptoms. He takes losartan and lovastatin as directed.   Onset/duration:  Months, but more frequent lately.   Precip. factors:  NA  Associated symptoms:  See above  Improves/worsens symptoms:  See above  Pain scale (0-10)   0/10  Allergies: No Known Allergies    NURSING PLAN: Nursing advice to patient See provider.     RECOMMENDED DISPOSITION:  See in 24 hours - Made appt with Dr. Albarran for tomorrow and instructed on sx that would warrant ED visit, such as chest/jaw/arm pain, SOB, dizziness with understanding voiced.   Will comply with recommendation: Yes  If further questions/concerns or if symptoms do not improve, worsen or new symptoms develop, call your PCP or [facility] as soon as possible.      Guideline used:  Telephone Triage Protocols for Nurses, Fifth Edition, JOVANNI Caballero RN

## 2018-12-11 ENCOUNTER — OFFICE VISIT (OUTPATIENT)
Dept: FAMILY MEDICINE | Facility: CLINIC | Age: 78
End: 2018-12-11
Payer: COMMERCIAL

## 2018-12-11 VITALS
SYSTOLIC BLOOD PRESSURE: 128 MMHG | BODY MASS INDEX: 33.46 KG/M2 | TEMPERATURE: 98.2 F | HEART RATE: 74 BPM | WEIGHT: 247 LBS | DIASTOLIC BLOOD PRESSURE: 76 MMHG | HEIGHT: 72 IN

## 2018-12-11 DIAGNOSIS — I48.91 ATRIAL FIBRILLATION, UNSPECIFIED TYPE (H): Primary | ICD-10-CM

## 2018-12-11 LAB
ERYTHROCYTE [DISTWIDTH] IN BLOOD BY AUTOMATED COUNT: 13.6 % (ref 10–15)
HCT VFR BLD AUTO: 36.4 % (ref 40–53)
HGB BLD-MCNC: 12.4 G/DL (ref 13.3–17.7)
MCH RBC QN AUTO: 35.2 PG (ref 26.5–33)
MCHC RBC AUTO-ENTMCNC: 34.1 G/DL (ref 31.5–36.5)
MCV RBC AUTO: 103 FL (ref 78–100)
PLATELET # BLD AUTO: 188 10E9/L (ref 150–450)
RBC # BLD AUTO: 3.52 10E12/L (ref 4.4–5.9)
WBC # BLD AUTO: 4.5 10E9/L (ref 4–11)

## 2018-12-11 PROCEDURE — 99214 OFFICE O/P EST MOD 30 MIN: CPT | Performed by: FAMILY MEDICINE

## 2018-12-11 PROCEDURE — 84439 ASSAY OF FREE THYROXINE: CPT | Performed by: FAMILY MEDICINE

## 2018-12-11 PROCEDURE — 85027 COMPLETE CBC AUTOMATED: CPT | Performed by: FAMILY MEDICINE

## 2018-12-11 PROCEDURE — 80048 BASIC METABOLIC PNL TOTAL CA: CPT | Performed by: FAMILY MEDICINE

## 2018-12-11 PROCEDURE — 93000 ELECTROCARDIOGRAM COMPLETE: CPT | Performed by: FAMILY MEDICINE

## 2018-12-11 PROCEDURE — 84443 ASSAY THYROID STIM HORMONE: CPT | Performed by: FAMILY MEDICINE

## 2018-12-11 PROCEDURE — 36415 COLL VENOUS BLD VENIPUNCTURE: CPT | Performed by: FAMILY MEDICINE

## 2018-12-11 ASSESSMENT — MIFFLIN-ST. JEOR: SCORE: 1860.44

## 2018-12-11 NOTE — PATIENT INSTRUCTIONS
Orders Placed This Encounter     TSH with free T4 reflex     Last Lab Result: No results found for: TSH     CBC with platelets     Last Lab Result: Hemoglobin (g/dL)       Date                     Value                 12/07/2017               11.9 (L)         ----------     Basic metabolic panel     CARDIOLOGY EVAL ADULT REFERRAL     Referral Type:   CV Cardio consult     Number of Visits Requested:   1     Scheduling Appointments for ECHO  [phone] [place] imaging scheduling    [facility]  Call: [phone]

## 2018-12-11 NOTE — PROGRESS NOTES
"  SUBJECTIVE:   Sami Harmon is a 78 year old male who presents to clinic today for the following health issues:    Yesterday patient was triaged as follows    Patient reports some improvement today. Still noticing slight issues at times.     NURSING ASSESSMENT:  Description:  Takes BP regularly, and machine will tell him when his rate is irregular.  He has had this in the past, but has been occurring more frequently lately.  He has had EKG and workup in the past.  He reports when occurs, can feel it in his pulse, but denies palpitations/fluttering, SOB, chest pain, dizziness, etc.  He denies very high or very low heart rate, just notices the rhythm irregularity. He has been exercising and able to perform his normal routines.  The episodes seem to be random with nothing really improving or worsening symptoms. He takes losartan and lovastatin as directed.   Onset/duration:  Months, but more frequent lately.        Problem list and histories reviewed & adjusted, as indicated.  Additional history: as documented    BP Readings from Last 3 Encounters:   12/11/18 128/76   12/14/17 114/70   12/13/16 115/62    Wt Readings from Last 3 Encounters:   12/11/18 112 kg (247 lb)   12/14/17 111.6 kg (246 lb)   12/13/16 112 kg (247 lb)                    Reviewed and updated as needed this visit by clinical staff       Reviewed and updated as needed this visit by Provider         ROS:  Constitutional, HEENT, cardiovascular, pulmonary, gi and gu systems are negative, except as otherwise noted.    OBJECTIVE:     /76   Pulse 74   Temp 98.2  F (36.8  C) (Oral)   Ht 1.816 m (5' 11.5\")   Wt 112 kg (247 lb)   BMI 33.97 kg/m    Body mass index is 33.97 kg/m .   GENERAL: healthy, alert and no distress  NECK: no adenopathy, no asymmetry, masses, or scars and thyroid normal to palpation  RESP: lungs clear to auscultation - no rales, rhonchi or wheezes  CV: irregularly irregular rhythm, no murmur, click or rub and no peripheral " edema  ABDOMEN: soft, nontender, no hepatosplenomegaly, no masses and bowel sounds normal  MS: no gross musculoskeletal defects noted, no edema  SKIN: no suspicious lesions or rashes  Neuro: normal no focal deficits  Diagnostic Test Results:    EKG A-fib    CHADS=2      Results for orders placed or performed in visit on 12/11/18 (from the past 24 hour(s))   CBC with platelets   Result Value Ref Range    WBC 4.5 4.0 - 11.0 10e9/L    RBC Count 3.52 (L) 4.4 - 5.9 10e12/L    Hemoglobin 12.4 (L) 13.3 - 17.7 g/dL    Hematocrit 36.4 (L) 40.0 - 53.0 %     (H) 78 - 100 fl    MCH 35.2 (H) 26.5 - 33.0 pg    MCHC 34.1 31.5 - 36.5 g/dL    RDW 13.6 10.0 - 15.0 %    Platelet Count 188 150 - 450 10e9/L       ASSESSMENT:       PLAN:     (I48.91) Atrial fibrillation, unspecified type (H)  Comment: new onset, CHADS =2 for age and HTN  Plan: Echocardiogram Complete, TSH with free T4         reflex, CBC with platelets, Basic metabolic         panel, CARDIOLOGY EVAL ADULT REFERRAL,         rivaroxaban ANTICOAGULANT (XARELTO) 15 MG TABS         tablet                  Patient Instructions     Orders Placed This Encounter     TSH with free T4 reflex     Last Lab Result: No results found for: TSH     CBC with platelets     Last Lab Result: Hemoglobin (g/dL)       Date                     Value                 12/07/2017               11.9 (L)         ----------     Basic metabolic panel     CARDIOLOGY EVAL ADULT REFERRAL     Referral Type:   CV Cardio consult     Number of Visits Requested:   1     Scheduling Appointments for ECHO  [phone] [place] imaging scheduling    [facility]  Call: [phone]            Clemente Albarran MD, MD  [facility]

## 2018-12-12 ENCOUNTER — MYC MEDICAL ADVICE (OUTPATIENT)
Dept: FAMILY MEDICINE | Facility: CLINIC | Age: 78
End: 2018-12-12

## 2018-12-12 ENCOUNTER — TELEPHONE (OUTPATIENT)
Dept: FAMILY MEDICINE | Facility: CLINIC | Age: 78
End: 2018-12-12

## 2018-12-12 DIAGNOSIS — I48.91 ATRIAL FIBRILLATION (H): Primary | ICD-10-CM

## 2018-12-12 DIAGNOSIS — I48.91 ATRIAL FIBRILLATION (H): ICD-10-CM

## 2018-12-12 DIAGNOSIS — E03.9 HYPOTHYROIDISM, UNSPECIFIED TYPE: ICD-10-CM

## 2018-12-12 DIAGNOSIS — I48.91 ATRIAL FIBRILLATION, UNSPECIFIED TYPE (H): ICD-10-CM

## 2018-12-12 DIAGNOSIS — E03.9 HYPOTHYROIDISM, UNSPECIFIED TYPE: Primary | ICD-10-CM

## 2018-12-12 LAB
ANION GAP SERPL CALCULATED.3IONS-SCNC: 6 MMOL/L (ref 3–14)
BUN SERPL-MCNC: 13 MG/DL (ref 7–30)
CALCIUM SERPL-MCNC: 8.9 MG/DL (ref 8.5–10.1)
CHLORIDE SERPL-SCNC: 102 MMOL/L (ref 94–109)
CO2 SERPL-SCNC: 27 MMOL/L (ref 20–32)
CREAT SERPL-MCNC: 1.4 MG/DL (ref 0.66–1.25)
GFR SERPL CREATININE-BSD FRML MDRD: 49 ML/MIN/1.7M2
GLUCOSE SERPL-MCNC: 87 MG/DL (ref 70–99)
POTASSIUM SERPL-SCNC: 4.8 MMOL/L (ref 3.4–5.3)
SODIUM SERPL-SCNC: 135 MMOL/L (ref 133–144)
T4 FREE SERPL-MCNC: 0.36 NG/DL (ref 0.76–1.46)
TSH SERPL DL<=0.005 MIU/L-ACNC: 53.67 MU/L (ref 0.4–4)

## 2018-12-12 RX ORDER — LEVOTHYROXINE SODIUM 50 UG/1
50 TABLET ORAL DAILY
Qty: 30 TABLET | Refills: 11 | Status: SHIPPED | OUTPATIENT
Start: 2018-12-12 | End: 2018-12-31

## 2018-12-12 RX ORDER — LEVOTHYROXINE SODIUM 50 UG/1
50 TABLET ORAL DAILY
Qty: 90 TABLET | Refills: 3 | Status: SHIPPED | OUTPATIENT
Start: 2018-12-12 | End: 2018-12-12

## 2018-12-12 NOTE — TELEPHONE ENCOUNTER
These scripts had just been sent for a year worth by PCP today, but in 3 month increments, so RN resent for 1 month increments as requested.  MyChart sent.  Will close encounter at this time.    Sarahi Peralta RN

## 2018-12-12 NOTE — TELEPHONE ENCOUNTER
Forward to PCP to review just to be sure I'm not missing anything.  Patient had several labs completed yesterday.  Based on results, does he need any repeat labs?  Otherwise, per health maintenance it looks like the only outstanding lab due is urine microalbumin (pended).    Mynor Gonzalez RN

## 2018-12-12 NOTE — TELEPHONE ENCOUNTER
Called and reviewed.    Hypothyroid. Recommend starting medication    Also creat slightly higher will change from Xarelto to Eliquis    No orders of the defined types were placed in this encounter.    Reviewed with pharmacy    Orders Placed This Encounter     levothyroxine (SYNTHROID/LEVOTHROID) 50 MCG tablet     Sig: Take 1 tablet (50 mcg) by mouth daily     Dispense:  90 tablet     Refill:  3     apixaban ANTICOAGULANT (ELIQUIS) 2.5 MG tablet     Sig: Take 1 tablet (2.5 mg) by mouth 2 times daily     Dispense:  180 tablet     Refill:  3     Recheck TSH 6 months

## 2018-12-12 NOTE — TELEPHONE ENCOUNTER
"Per conversation with Dr. Albarran adjusting Eliquis dose up to 5mg twice daily.  Dose reduction is recommended if \"Renal impairment (nonvalvular atrial fibrillation): 2.5 mg orally twice daily in patients with at least 2 of the following characteristics, age 80 years or older, body weight 60 kg or less, or serum creatinine 1.5 mg/dL (133 mcmol/L) or higher\".  Patient only meets one of these criteria (>80 years old).  CrCl is 1.4 and patient weighs >60kg.    Angie Mejia PharmD, [facility]  Pharmacist Manager   [facility]  [phone]          "

## 2018-12-17 ENCOUNTER — ANCILLARY PROCEDURE (OUTPATIENT)
Dept: CARDIOLOGY | Facility: CLINIC | Age: 78
End: 2018-12-17
Attending: FAMILY MEDICINE
Payer: COMMERCIAL

## 2018-12-17 DIAGNOSIS — I48.91 ATRIAL FIBRILLATION, UNSPECIFIED TYPE (H): ICD-10-CM

## 2018-12-17 PROCEDURE — 93306 TTE W/DOPPLER COMPLETE: CPT | Performed by: INTERNAL MEDICINE

## 2018-12-21 ENCOUNTER — ANCILLARY PROCEDURE (OUTPATIENT)
Dept: CARDIOLOGY | Facility: CLINIC | Age: 78
End: 2018-12-21
Attending: INTERNAL MEDICINE
Payer: COMMERCIAL

## 2018-12-21 ENCOUNTER — OFFICE VISIT (OUTPATIENT)
Dept: CARDIOLOGY | Facility: CLINIC | Age: 78
End: 2018-12-21
Payer: COMMERCIAL

## 2018-12-21 VITALS
SYSTOLIC BLOOD PRESSURE: 130 MMHG | WEIGHT: 247 LBS | OXYGEN SATURATION: 97 % | HEART RATE: 77 BPM | DIASTOLIC BLOOD PRESSURE: 78 MMHG | BODY MASS INDEX: 33.97 KG/M2

## 2018-12-21 DIAGNOSIS — I48.0 PAROXYSMAL ATRIAL FIBRILLATION (H): Primary | ICD-10-CM

## 2018-12-21 DIAGNOSIS — E78.2 MIXED HYPERLIPIDEMIA: ICD-10-CM

## 2018-12-21 DIAGNOSIS — I10 BENIGN ESSENTIAL HYPERTENSION: ICD-10-CM

## 2018-12-21 DIAGNOSIS — I48.0 PAROXYSMAL ATRIAL FIBRILLATION (H): ICD-10-CM

## 2018-12-21 PROCEDURE — 0296T ZIO PATCH HOLTER ADULT PEDIATRIC GREATER THAN 48 HRS: CPT | Performed by: INTERNAL MEDICINE

## 2018-12-21 PROCEDURE — 99204 OFFICE O/P NEW MOD 45 MIN: CPT | Performed by: INTERNAL MEDICINE

## 2018-12-21 PROCEDURE — 0298T ZZC EXT ECG > 48HR TO 21 DAY REVIEW AND INTERPRETATN: CPT | Performed by: INTERNAL MEDICINE

## 2018-12-21 PROCEDURE — 93000 ELECTROCARDIOGRAM COMPLETE: CPT | Performed by: INTERNAL MEDICINE

## 2018-12-21 RX ORDER — METOPROLOL TARTRATE 25 MG/1
12.5 TABLET, FILM COATED ORAL 2 TIMES DAILY
Qty: 90 TABLET | Refills: 1 | Status: SHIPPED | OUTPATIENT
Start: 2018-12-21 | End: 2019-01-23

## 2018-12-21 NOTE — NURSING NOTE
"Chief Complaint   Patient presents with     Atrial Fib     Echo. 12/17/18. Per patient no bothersome symptoms at this time other then BP. readings       Initial /78 (BP Location: Left arm, Patient Position: Sitting, Cuff Size: Adult Large)   Pulse 77   Wt 112 kg (247 lb)   SpO2 97%   BMI 33.97 kg/m   Estimated body mass index is 33.97 kg/m  as calculated from the following:    Height as of 12/11/18: 1.816 m (5' 11.5\").    Weight as of this encounter: 112 kg (247 lb)..  BP completed using cuff size: large  Ekg.test performed today per Provider order after patient Ekg. Education relayed to patient,then Ekg. Result handed to provider for review.  Ivette MATIASP.N.  14 days ZioXT applied to patient today. Instructions on use and removal given and mail back instructions discussed with patient. All questions answered. Zio Device registered with [email or web address] via internet.   Device number: I48.0    Ivette MATIASPMjN.                Ivette Chavez L.P.N.    "

## 2018-12-21 NOTE — PATIENT INSTRUCTIONS
Thank you for coming to the [facility] @ [facility]; please note the following instructions:    1.To start Metoprolol Tartrate 25 mg, Take 12.5 mg twice daily    2. To wear a heart monitor ZIO patch for 14 days     3. 3 months follow-up with Dr Cornelius       If you have any questions regarding your visit please contact your care team:     Cardiology  Telephone Number   Brigid MORGAN., RN  Adrienne PABON, RN   Kasey MATIAS, AVEL SOOD, MILADIS SHEEHAN, CHAVAN   [phone]    *After hours: [phone]   For scheduling appts:     [phone] or    [phone] (select option 1)    *After hours: [phone]     For the Device Clinic (Pacemakers and ICD's)  RN's :  Gali Proctor   During business hours: [phone]    *After business hours:  [phone] (select option 4)      Normal test result notifications will be released via Nanoference or mailed within 7 business days.  All other test results, will be communicated via telephone once reviewed by your cardiologist.    If you need a medication refill please contact your pharmacy.  Please allow 3 business days for your refill to be completed.    As always, thank you for trusting us with your health care needs!        Patient Education     Metoprolol tablets  Brand Name: Lopressor  What is this medicine?  METOPROLOL (me TOE proe lole) is a beta-blocker. Beta-blockers reduce the workload on the heart and help it to beat more regularly. This medicine is used to treat high blood pressure and to prevent chest pain. It is also used to after a heart attack and to prevent an additional heart attack from occurring.  How should I use this medicine?  Take this medicine by mouth with a drink of water. Follow the directions on the prescription label. Take this medicine immediately after meals. Take your doses at regular intervals. Do not take more medicine than directed. Do not stop taking this medicine suddenly. This could lead to serious heart-related effects.  Talk  to your pediatrician regarding the use of this medicine in children. Special care may be needed.  What side effects may I notice from receiving this medicine?  Side effects that you should report to your doctor or health care professional as soon as possible:    allergic reactions like skin rash, itching or hives    cold or numb hands or feet    depression    difficulty breathing    faint    fever with sore throat    irregular heartbeat, chest pain    rapid weight gain    swollen legs or ankles  Side effects that usually do not require medical attention (report to your doctor or health care professional if they continue or are bothersome):    anxiety or nervousness    change in sex drive or performance    dry skin    headache    nightmares or trouble sleeping    short term memory loss    stomach upset or diarrhea    unusually tired  What may interact with this medicine?  This medicine may interact with the following medications:    certain medicines for blood pressure, heart disease, irregular heart beat    certain medicines for depression like monoamine oxidase (MAO) inhibitors, fluoxetine, or paroxetine    clonidine    dobutamine    epinephrine    isoproterenol    reserpine  What if I miss a dose?  If you miss a dose, take it as soon as you can. If it is almost time for your next dose, take only that dose. Do not take double or extra doses.  Where should I keep my medicine?  Keep out of the reach of children.  Store at room temperature between 15 and 30 degrees C (59 and 86 degrees F). Throw away any unused medicine after the expiration date.  What should I tell my health care provider before I take this medicine?  They need to know if you have any of these conditions:    diabetes    heart or vessel disease like slow heart rate, worsening heart failure, heart block, sick sinus syndrome or Raynaud's disease    kidney disease    liver disease    lung or breathing disease, like asthma or  emphysema    pheochromocytoma    thyroid disease    an unusual or allergic reaction to metoprolol, other beta-blockers, medicines, foods, dyes, or preservatives    pregnant or trying to get pregnant    breast-feeding  What should I watch for while using this medicine?  Visit your doctor or health care professional for regular check ups. Contact your doctor right away if your symptoms worsen. Check your blood pressure and pulse rate regularly. Ask your health care professional what your blood pressure and pulse rate should be, and when you should contact them.  You may get drowsy or dizzy. Do not drive, use machinery, or do anything that needs mental alertness until you know how this medicine affects you. Do not sit or stand up quickly, especially if you are an older patient. This reduces the risk of dizzy or fainting spells. Contact your doctor if these symptoms continue. Alcohol may interfere with the effect of this medicine. Avoid alcoholic drinks.  NOTE:This sheet is a summary. It may not cover all possible information. If you have questions about this medicine, talk to your doctor, pharmacist, or health care provider. Copyright  2018 [employer]

## 2018-12-21 NOTE — NURSING NOTE
Cardiac Monitors: 14 weeks ZIO per Dr Cornelius Patient was instructed regarding the indication, function, care and prompt return of. The monitor was placed on the patient with instructions regarding care of the skin electrodes and monitor, as well as documentation in the patient diary. Patient demonstrated understanding of this information and agreed to call with further questions or concerns.    Med Reconcile: Reviewed and verified all current medications with the patient. The updated medication list was printed and given to the patient.    New Medication: Per Dr Cornelius,start Metoprolol tartrate 25 mg to take 12.5 mg twice daily.Patient was educated regarding newly prescribed medication, including discussion of  the indication, administration, side effects, and when to report to MD or RN.    Return Appointment: 3 months follow-up,Pt is scheduled for 03.22 in [place].    Patient expressed understanding and asked to call clinic with any questions or concerns.       Fatemeh Salmon RN

## 2018-12-21 NOTE — LETTER
12/21/2018      RE: Sami Harmon  [address] [place] [id number]       Dear Colleague,    Thank you for the opportunity to participate in the care of your patient, Sami Harmon, at the [facility] AT [facility] at [facility]. Please see a copy of my visit note below.    December 21, 2018    Faustino Resendiz is an 79 yo gentleman with minimal past medical history including HTN, HL, who was recently seen by primary care provider and was noted to be in atrial fibrillation with controlled ventricular rate.  What he has been completely asymptomatic of his atrial fibrillation he was checking his blood pressure at home and he did notice that his blood pressure cuff shows irregular heart rate.  This prompted him asking his primary care provider about the regular heart rate and disease how his atrial fibrillation was discovered again he is completely symptomatically at this denies any chest pains any dizziness lightheadedness syncope episodes of palpitations.  He denies any other complaints at this time he also denies any history of stroke-like symptoms.    PAST MEDICAL HISTORY:  Past Medical History:   Diagnosis Date     Arthritis 2010     Essential hypertension, benign 2004     Macular degeneration 1980s     Pure hypercholesterolemia 2004    goal ldl <100     FAMILY HISTORY:  Family History   Problem Relation Age of Onset     Arthritis Mother      Eye Disorder Mother      Diabetes Mother         type 2     Hypertension Mother      Cancer - colorectal Mother      Cerebrovascular Disease Mother      Osteoporosis Mother      Obesity Mother      Colon Cancer Mother      Heart Disease Father      Prostate Cancer Father      Cerebrovascular Disease Father      Heart Disease Brother      Prostate Cancer Brother      Gastrointestinal Disease Son       SOCIAL HISTORY:  Social History     Socioeconomic History     Marital status:      Spouse  name: Meredith     Number of children: 4     Years of education: Not on file     Highest education level: Not on file   Social Needs     Financial resource strain: Not on file     Food insecurity - worry: Not on file     Food insecurity - inability: Not on file     Transportation needs - medical: Not on file     Transportation needs - non-medical: Not on file   Occupational History     Occupation:      Employer: RETIRED   Tobacco Use     Smoking status: Passive Smoke Exposure - Never Smoker     Smokeless tobacco: Former User     Tobacco comment: 1 cigar daily   Substance and Sexual Activity     Alcohol use: Yes     Comment: 1 drink per day     Drug use: No     Sexual activity: No     Partners: Female   Other Topics Concern     Parent/sibling w/ CABG, MI or angioplasty before 65F 55M? No      Service No     Blood Transfusions No     Caffeine Concern No     Occupational Exposure No     Hobby Hazards No     Sleep Concern No     Stress Concern No     Weight Concern No     Special Diet No     Back Care No     Exercise No     Bike Helmet No     Seat Belt Yes     Self-Exams Yes   Social History Narrative     Not on file     CURRENT MEDICATIONS:  Current Outpatient Medications   Medication     apixaban ANTICOAGULANT (ELIQUIS) 5 MG tablet     Ferrous Sulfate (IRON SUPPLEMENT PO)     levothyroxine (SYNTHROID/LEVOTHROID) 50 MCG tablet     losartan (COZAAR) 100 MG tablet     lovastatin (MEVACOR) 40 MG tablet     LUTEIN 6 MG CAPS     MULTI-VITAMIN OR     OMEGA 3 1000 MG OR CAPS     Psyllium (METAMUCIL PO)     No current facility-administered medications for this visit.      ROS:   Constitutional: No fever, chills, or sweats. Weight is 0 lbs 0 oz  ENT: No visual disturbance, ear ache, epistaxis, sore throat.   Allergies/Immunologic: Negative.   Respiratory: No cough, hemoptysis.   Cardiovascular: As per HPI.   GI: No nausea, vomiting, hematemesis, melena, or hematochezia.   : No urinary frequency, dysuria, or  hematuria.   Integrument: Negative.   Psychiatric: No evidence of major depression  Neuro: No new neurological complaints at this time. Non focal  Endocrinology: Negative.   Musculoskeletal: As per HPI.      EXAM:  /78 (BP Location: Left arm, Patient Position: Sitting, Cuff Size: Adult Large)   Pulse 77   Wt 112 kg (247 lb)   SpO2 97%   BMI 33.97 kg/m     General: appears comfortable, alert and oriented  Head: normocephalic, atraumatic  Eyes: anicteric sclera, EOMI , PERRL  Neck: no adenopathy  Orophyarynx: moist mucosa, no lesions noted  Heart: regular, S1/S2, no murmurs, rubs or gallop. Estimated JVP at 5 cmH2O  Lungs: CTAB, No wheezing.   Abdomen: soft, non-tender, bowel sounds present, no hepatosplenomegaly  Extremities: No LE edema today  Skin: no open lesions noted  Neuro: grossly non-focal  Psych: no evidence of depression noted     Labs:  Lab Results   Component Value Date    WBC 4.5 12/11/2018    HGB 12.4 (L) 12/11/2018    HCT 36.4 (L) 12/11/2018     12/11/2018     12/11/2018    POTASSIUM 4.8 12/11/2018    CHLORIDE 102 12/11/2018    CO2 27 12/11/2018    BUN 13 12/11/2018    CR 1.40 (H) 12/11/2018    GLC 87 12/11/2018    AST 38 08/26/2009    ALT 21 09/28/2011    INR 1.0 06/04/2015     Echocardiogram reviewed 12/2018:  Global and regional left ventricular function is normal with an EF of 55-60%.  The right ventricle is normal size. Global right ventricular function is  Normal. Mild aortic valve sclerosis is present. The proximal Asc Aorta measures 4.2cm in diameter. The aortic root index is 1.8cm/m2 (Normal index for males is 1.4-1.7cm/m2) No pericardial effusion is present.    ASSESSMENT AND PLAN:  In summary, patient is a 78 year old history of hypertension hyperlipidemia and recently diagnosed atrial fibrillation curette and apixaban who presented to the cardiology clinic to establish care for his atrial fibrillation.  He did have an echocardiogram which showed normal ejection  fraction and he does not have any symptoms pertinent to heart failure.  He is currently not on any beta blockade but has appropriately been started on apixaban.  He is chadsVasc score is 3 given his age and hypertension this will put him at 3.2% annual risk of stroke with atrial fibrillation.  EKG in clinic today showed normal sinus rhythm with first-degree AV block.  Normal heart rate.  He will continue his apixaban at this point given his paroxysmal episodes of atrial fibrillation.  It is unclear how long he is episodes last and how frequently does he does have the episodes.  I agree with apixaban 5 mg twice daily dosing however we have to monitor his renal function intermittently as apixaban dose should be reduced if 2 of the following criteria met 1) age above 80 years old, 2) weight less than 60 kg which is not, 3) creatinine about 1.5.  So if his creatinine reaches 1.5 or higher I would decrease his apixaban dose to 2.5 mg twice daily dosing.  Will start low-dose metoprolol at 12.5 mg twice daily dosing continue his antihypertensives.   We also will attest to see a patch monitor today to evaluate his atrial fibrillation burden and his rate control.  It is possible that with metoprolol and control of his thyroid function is atrial fibrillation burden will decrease significantly.  I will see him back in 3 months time and less frequently if subsequently if he remains times asymptomatic and well-controlled.     Follow up:   3-month    I appreciate the opportunity to participate in the care of Sami Harmon . Please do not hesitate to contact me with any further questions.       Benji Cornelius MD     [facility] Division of Cardiology

## 2018-12-21 NOTE — PROGRESS NOTES
Ekg.test performed today per Provider order after patient Ekg. Education relayed to patient,then Ekg. Result handed to provider for review.  Ivette Chavez L.P.N.      14 days ZioXT applied to patient today. Instructions on use and removal given and mail back instructions discussed with patient. All questions answered. Zio Device registered with [employer] via internet.   Device number: [id number], DX.[id number].    Ivette Chavez L.P.N.

## 2018-12-27 ASSESSMENT — ENCOUNTER SYMPTOMS
HEARTBURN: 0
COUGH: 1
EYE PAIN: 0
FEVER: 0
WEAKNESS: 0
SHORTNESS OF BREATH: 0
CHILLS: 0
JOINT SWELLING: 0
HEMATOCHEZIA: 0
HEADACHES: 0
ARTHRALGIAS: 0
DIARRHEA: 0
ABDOMINAL PAIN: 0
FREQUENCY: 0
DIZZINESS: 0
SORE THROAT: 0
CONSTIPATION: 0
HEMATURIA: 0
MYALGIAS: 0
NERVOUS/ANXIOUS: 0

## 2018-12-27 ASSESSMENT — ACTIVITIES OF DAILY LIVING (ADL): CURRENT_FUNCTION: NO ASSISTANCE NEEDED

## 2018-12-31 ENCOUNTER — OFFICE VISIT (OUTPATIENT)
Dept: FAMILY MEDICINE | Facility: CLINIC | Age: 78
End: 2018-12-31
Payer: COMMERCIAL

## 2018-12-31 VITALS
HEART RATE: 76 BPM | SYSTOLIC BLOOD PRESSURE: 122 MMHG | DIASTOLIC BLOOD PRESSURE: 76 MMHG | TEMPERATURE: 97.8 F | HEIGHT: 71 IN | BODY MASS INDEX: 34.23 KG/M2 | WEIGHT: 244.5 LBS

## 2018-12-31 DIAGNOSIS — E78.5 HYPERLIPIDEMIA LDL GOAL <100: ICD-10-CM

## 2018-12-31 DIAGNOSIS — Z00.00 ROUTINE HISTORY AND PHYSICAL EXAMINATION OF ADULT: Primary | ICD-10-CM

## 2018-12-31 DIAGNOSIS — I10 HYPERTENSION GOAL BP (BLOOD PRESSURE) < 140/90: ICD-10-CM

## 2018-12-31 DIAGNOSIS — N18.30 CKD (CHRONIC KIDNEY DISEASE) STAGE 3, GFR 30-59 ML/MIN (H): ICD-10-CM

## 2018-12-31 DIAGNOSIS — E03.9 ACQUIRED HYPOTHYROIDISM: ICD-10-CM

## 2018-12-31 DIAGNOSIS — I48.0 PAROXYSMAL ATRIAL FIBRILLATION (H): ICD-10-CM

## 2018-12-31 DIAGNOSIS — E03.9 HYPOTHYROIDISM, UNSPECIFIED TYPE: ICD-10-CM

## 2018-12-31 PROCEDURE — 99397 PER PM REEVAL EST PAT 65+ YR: CPT | Performed by: FAMILY MEDICINE

## 2018-12-31 PROCEDURE — 99213 OFFICE O/P EST LOW 20 MIN: CPT | Mod: 25 | Performed by: FAMILY MEDICINE

## 2018-12-31 RX ORDER — LOVASTATIN 40 MG
40 TABLET ORAL AT BEDTIME
Qty: 90 TABLET | Refills: 3 | Status: SHIPPED | OUTPATIENT
Start: 2018-12-31 | End: 2019-12-13

## 2018-12-31 RX ORDER — LOSARTAN POTASSIUM 100 MG/1
100 TABLET ORAL DAILY
Qty: 90 TABLET | Refills: 3 | Status: SHIPPED | OUTPATIENT
Start: 2018-12-31 | End: 2019-12-13

## 2018-12-31 RX ORDER — LEVOTHYROXINE SODIUM 50 UG/1
75 TABLET ORAL DAILY
Qty: 30 TABLET | Refills: 11 | COMMUNITY
Start: 2018-12-31 | End: 2019-01-29 | Stop reason: DRUGHIGH

## 2018-12-31 RX ORDER — LEVOTHYROXINE SODIUM 75 UG/1
75 TABLET ORAL DAILY
Qty: 60 TABLET | Refills: 3 | Status: SHIPPED | OUTPATIENT
Start: 2018-12-31 | End: 2019-01-29 | Stop reason: DRUGHIGH

## 2018-12-31 RX ORDER — UREA 10 %
500 LOTION (ML) TOPICAL DAILY
COMMUNITY
End: 2019-12-31

## 2018-12-31 ASSESSMENT — ENCOUNTER SYMPTOMS
HEMATOCHEZIA: 0
SORE THROAT: 0
HEADACHES: 0
ARTHRALGIAS: 0
DIARRHEA: 0
COUGH: 1
CONSTIPATION: 0
HEARTBURN: 0
FEVER: 0
NERVOUS/ANXIOUS: 0
WEAKNESS: 0
ABDOMINAL PAIN: 0
SHORTNESS OF BREATH: 0
HEMATURIA: 0
DIZZINESS: 0
EYE PAIN: 0
MYALGIAS: 0
CHILLS: 0
JOINT SWELLING: 0
FREQUENCY: 0

## 2018-12-31 ASSESSMENT — MIFFLIN-ST. JEOR: SCORE: 1845.13

## 2018-12-31 ASSESSMENT — ACTIVITIES OF DAILY LIVING (ADL): CURRENT_FUNCTION: NO ASSISTANCE NEEDED

## 2018-12-31 NOTE — PATIENT INSTRUCTIONS
Preventive Health Recommendations:     Break the Thyroid medication in half. And Take 1 and half daily. Increase to 75 micrograms and follow up in end of January for Lab    Consider shingles vaccine. It may be best to get at pharmacy to know the cost. Your need 2 shots with the second sometime 2 months after the first.       See your health care provider every year to    Review health changes.     Discuss preventive care.      Review your medicines if your doctor has prescribed any.    Talk with your health care provider about whether you should have a test to screen for prostate cancer (PSA).    Every 3 years, have a diabetes test (fasting glucose). If you are at risk for diabetes, you should have this test more often.    Every 5 years, have a cholesterol test. Have this test more often if you are at risk for high cholesterol or heart disease.     Every 10 years, have a colonoscopy. Or, have a yearly FIT test (stool test). These exams will check for colon cancer.    Talk to with your health care provider about screening for Abdominal Aortic Aneurysm if you have a family history of AAA or have a history of smoking.  Shots:     Get a flu shot each year.     Get a tetanus shot every 10 years.     Talk to your doctor about your pneumonia vaccines. There are now two you should receive - Pneumovax (PPSV 23) and Prevnar (PCV 13).    Talk to your pharmacist about a shingles vaccine.     Talk to your doctor about the hepatitis B vaccine.  Nutrition:     Eat at least 5 servings of fruits and vegetables each day.     Eat whole-grain bread, whole-wheat pasta and brown rice instead of white grains and rice.     Get adequate Calcium and Vitamin D.   Lifestyle    Exercise for at least 150 minutes a week (30 minutes a day, 5 days a week). This will help you control your weight and prevent disease.     Limit alcohol to one drink per day.     No smoking.     Wear sunscreen to prevent skin cancer.     See your dentist every six  months for an exam and cleaning.     See your eye doctor every 1 to 2 years to screen for conditions such as glaucoma, macular degeneration and cataracts.    Personalized Prevention Plan  You are due for the preventive services outlined below.  Your care team is available to assist you in scheduling these services.  If you have already completed any of these items, please share that information with your care team to update in your medical record.    Health Maintenance Due   Topic Date Due     Zoster (Chicken Pox) Vaccine (2 of 3) 10/15/2007     Microalbumin Lab - yearly  12/07/2018     FALL RISK ASSESSMENT  12/14/2018

## 2018-12-31 NOTE — PROGRESS NOTES
"SUBJECTIVE:   Sami Harmon is a 78 year old male who presents for Preventive Visit.    Are you in the first 12 months of your Medicare coverage?  No    Annual Wellness Visit     In general, how would you rate your overall health?  Good    Frequency of exercise:  6-7 days/week    Duration of exercise:  45-60 minutes    Do you usually eat at least 4 servings of fruit and vegetables a day, include whole grains    & fiber and avoid regularly eating high fat or \"junk\" foods?  Yes    Taking medications regularly:  Yes    Medication side effects:  None    Ability to successfully perform activities of daily living:  No assistance needed    Home Safety:  No safety concerns identified    Hearing Impairment:  Difficulty following a conversation in a noisy restaurant or crowded room, need to ask people to speak up or repeat themselves and difficulty understanding soft or whispered speech    In the past 6 months, have you been bothered by leaking of urine?  No    In general, how would you rate your overall mental or emotional health?  Excellent    PHQ-2 Total Score: 0    Additional concerns today:  Yes (Discuss Afib. Has heart monitor on until end of week.)    Do you feel safe in your environment? Yes    Do you have a Health Care Directive? Yes: Advance Directive has been received and scanned.      Fall risk  Fallen 2 or more times in the past year?: No  Any fall with injury in the past year?: No    Cognitive Screening   1) Repeat 3 items (Leader, Season, Table)    2) Clock draw: NORMAL  3) 3 item recall: Recalls 3 objects  Results: 3 items recalled: COGNITIVE IMPAIRMENT LESS LIKELY    Mini-CogTM Copyright ISA Paniagua. Licensed by the author for use in [facility]; reprinted with permission ([email or web address]). All rights reserved.      Patient is here today for his chronic disease management and yearly physical. He has a history of atrial fibrillation, thyroid dysfunction, hypertension.    Thyroid dysfunction:  Patient " TSH was 53 on his last exam, he previously reported hypothyroidism symptoms. He was given Synthroid and reports tolerating the medication well. He denies worsening in symptoms and side effects with his medication. His last free T4 was 0.36 on 12/22/2018    Constipation:  Patient previously reports worsening of his constipation but no claims its getting better.    Weight Gain:  Patient has lost some weight recently. He cut down on certain foods and snacks. But his overall weight has remain stable.     Medication:  Patient chronic atrial fibrillation is being managed by his cardiologist, he tolerates his thyroid medication and reports no side effects at this moment. His medication is reconcile.     Immunization:  Patient is not up to date with the shingle vaccine.    Do you have sleep apnea, excessive snoring or daytime drowsiness?: no    Reviewed and updated as needed this visit by clinical staff  Tobacco  Allergies  Meds  Med Hx  Surg Hx  Fam Hx  Soc Hx        Reviewed and updated as needed this visit by Provider        Social History     Tobacco Use     Smoking status: Never Smoker     Smokeless tobacco: Former User     Types: Chew     Tobacco comment: Chewed tobacco for 30 years   Substance Use Topics     Alcohol use: Yes     Comment: 1 or less drink per day       Alcohol Use 12/27/2018   If you drink alcohol do you typically have greater than 3 drinks per day OR greater than 7 drinks per week? No   No flowsheet data found.  Current providers sharing in care for this patient include:   Patient Care Team:  Clemente Albarran MD as PCP - General (Family Practice)  Clemente Albarran MD as PCP - Assigned PCP    The following health maintenance items are reviewed in Epic and correct as of today:  Health Maintenance   Topic Date Due     ZOSTER IMMUNIZATION (2 of 3) 10/15/2007     MICROALBUMIN Q1 YEAR  12/07/2018     FALL RISK ASSESSMENT  12/14/2018     LIPID MONITORING Q1 YEAR  06/21/2019     PSA Q1 YR   06/21/2019     ADVANCE DIRECTIVE PLANNING Q5 YRS  07/30/2019     BMP Q1 YR  12/11/2019     HEMOGLOBIN Q1 YR  12/11/2019     PHQ-2 Q1 YR  12/31/2019     DTAP/TDAP/TD IMMUNIZATION (3 - Td) 10/10/2022     COLONOSCOPY Q5 YR  11/08/2022     INFLUENZA VACCINE  Completed     IPV IMMUNIZATION  Aged Out     MENINGITIS IMMUNIZATION  Aged Out     Labs reviewed in EPIC  BP Readings from Last 3 Encounters:   12/31/18 122/76   12/21/18 130/78   12/11/18 128/76    Wt Readings from Last 3 Encounters:   12/31/18 244 lb 8 oz (110.9 kg)   12/21/18 247 lb (112 kg)   12/11/18 247 lb (112 kg)                  Patient Active Problem List   Diagnosis     Family history of prostate cancer     Hyperlipidemia LDL goal <100     CKD (chronic kidney disease) stage 3, GFR 30-59 ml/min (H)     Advance care planning     Primary localized osteoarthrosis, pelvic region and thigh     History of colon polyps     Advanced directives, counseling/discussion     Hypertension goal BP (blood pressure) < 140/90     Overweight     Paroxysmal atrial fibrillation (H)     Acquired hypothyroidism     Past Surgical History:   Procedure Laterality Date     ABDOMEN SURGERY  1970's, 1980's    Two hernia surgerys     BIOPSY  July 2016    polyp removed from vocal cord - benign     COLONOSCOPY       COSMETIC SURGERY       EYE SURGERY  2000    Macular scar, presently being treated with avastin     HERNIA REPAIR, INGUINAL RT/LT  1970,1980     ORTHOPEDIC SURGERY  April 2015, June 2015    Right hip replacement, Left hip replacement     SURGICAL HISTORY OF -   2000    cholesteatoma removal L     SURGICAL HISTORY OF -   3/2004    laser macular     SURGICAL HISTORY OF -   4-2015    right hip replacement     TONSILLECTOMY & ADENOIDECTOMY  1961       Social History     Tobacco Use     Smoking status: Never Smoker     Smokeless tobacco: Former User     Types: Chew     Tobacco comment: Chewed tobacco for 30 years   Substance Use Topics     Alcohol use: Yes     Comment: 1 or less  drink per day     Family History   Problem Relation Age of Onset     Arthritis Mother      Eye Disorder Mother      Diabetes Mother         type 2     Hypertension Mother      Cancer - colorectal Mother      Cerebrovascular Disease Mother      Osteoporosis Mother      Obesity Mother      Colon Cancer Mother      Heart Disease Father      Prostate Cancer Father      Cerebrovascular Disease Father      Heart Disease Brother      Prostate Cancer Brother      Coronary Artery Disease Brother      Gastrointestinal Disease Son          Current Outpatient Medications   Medication Sig Dispense Refill     apixaban ANTICOAGULANT (ELIQUIS) 5 MG tablet Take 1 tablet (5 mg) by mouth 2 times daily 60 tablet 11     Ferrous Sulfate (IRON SUPPLEMENT PO) Take by mouth daily        levothyroxine (SYNTHROID/LEVOTHROID) 50 MCG tablet Take 1.5 tablets (75 mcg) by mouth daily 30 tablet 11     levothyroxine (SYNTHROID/LEVOTHROID) 75 MCG tablet Take 1 tablet (75 mcg) by mouth daily 60 tablet 3     losartan (COZAAR) 100 MG tablet Take 1 tablet (100 mg) by mouth daily Refill when requested 90 tablet 3     lovastatin (MEVACOR) 40 MG tablet Take 1 tablet (40 mg) by mouth At Bedtime Refill when requested 90 tablet 3     LUTEIN 6 MG CAPS take  by mouth.       metoprolol tartrate (LOPRESSOR) 25 MG tablet Take 0.5 tablets (12.5 mg) by mouth 2 times daily 90 tablet 1     MULTI-VITAMIN OR 1 tablets  daily       OMEGA 3 1000 MG OR CAPS take 1 capsule by mouth 2 times daily.       Psyllium (METAMUCIL PO) Take by mouth daily 1 teaspoon added to 1 glass of water once daily       vitamin B-12 (CYANOCOBALAMIN) 500 MCG tablet Take 500 mcg by mouth daily       No Known Allergies  Recent Labs   Lab Test 12/11/18  0955 06/21/18  0745  06/27/17  0743  05/18/16  0841  09/28/11  0824   LDL  --  86  --  96  --  81   < >  --    HDL  --  70  --  71  --  64   < >  --    TRIG  --  45  --  60  --  56   < >  --    ALT  --   --   --   --   --   --   --  21   CR 1.40*  "1.35*   < > 1.38*   < > 1.49*   < >  --    GFRESTIMATED 49* 51*   < > 50*   < > 46*   < >  --    GFRESTBLACK 59* 62   < > 60*   < > 55*   < >  --    POTASSIUM 4.8 4.2   < > 4.4   < > 4.4   < >  --    TSH 53.67*  --   --   --   --   --   --   --     < > = values in this interval not displayed.      Pneumonia Vaccine:Adults age 65+ who received their first dose of Pneumovax (PPSV23) prior to age 65 years: Should be given PCV 13 > 1 year after their most recent PPSV23 AND should be given a another dose of PPSV23 > 5 years after their most recent dose of PPSV23    Review of Systems   Constitutional: Negative for chills and fever.   HENT: Positive for hearing loss. Negative for congestion, ear pain and sore throat.    Eyes: Negative for pain and visual disturbance.   Respiratory: Positive for cough. Negative for shortness of breath.    Cardiovascular: Negative for chest pain and peripheral edema.   Gastrointestinal: Negative for abdominal pain, constipation, diarrhea, heartburn and hematochezia.   Genitourinary: Positive for urgency. Negative for discharge, frequency, genital sores, hematuria and impotence.   Musculoskeletal: Negative for arthralgias, joint swelling and myalgias.   Skin: Negative for rash.   Neurological: Negative for dizziness, weakness and headaches.   Psychiatric/Behavioral: Negative for mood changes. The patient is not nervous/anxious.      This document serves as a record of the services and decisions personally performed by DAVID LUCIANO. It was created on his behalf by Anup Cloud trained medical scribes. The creation of this document is based on the provider's statements to the medical scribe. Anup Cloud, December 31, 2018 9:34 AM      OBJECTIVE:   /76 (BP Location: Right arm, Cuff Size: Adult Large)   Pulse 76   Temp 97.8  F (36.6  C) (Oral)   Ht 5' 11.25\" (1.81 m)   Wt 244 lb 8 oz (110.9 kg)   BMI 33.86 kg/m   Estimated body mass index is 33.86 kg/m  as calculated from the " "following:    Height as of this encounter: 5' 11.25\" (1.81 m).    Weight as of this encounter: 244 lb 8 oz (110.9 kg).  Physical Exam  GENERAL: healthy, alert and no distress  EYES: Eyes grossly normal to inspection, PERRL and conjunctivae and sclerae normal  HENT: ear canals and TM's normal, nose and mouth without ulcers or lesions  NECK: no adenopathy, no asymmetry, masses, or scars and thyroid normal to palpation  RESP: lungs clear to auscultation - no rales, rhonchi or wheezes  CV: history of atrial fib, regular rate and rhythm, normal S1 S2, no S3 or S4, no murmur, click or rub, no peripheral edema and peripheral pulses strong  ABDOMEN: soft, nontender, no hepatosplenomegaly, no masses and bowel sounds normal  MS: no gross musculoskeletal defects noted, no edema  SKIN: mild right great toe  Early tenia, no suspicious lesions or rashes  NEURO: Normal strength and tone, mentation intact and speech normal  PSYCH: mentation appears normal, affect normal/bright    Diagnostic Test Results:  No results found for this or any previous visit (from the past 24 hour(s)).    ASSESSMENT / PLAN:   (Z00.00) Routine history and physical examination of adult  (primary encounter diagnosis)  Comment: Routine history and physical  Plan: follow up for annual exams and for monitoring of chronic problems    (I10) Hypertension goal BP (blood pressure) < 140/90  Comment: Patient blood pressure is stable  Plan: losartan (COZAAR) 100 MG tablet        Continue Losartan 100 mg tablet    (E78.5) Hyperlipidemia LDL goal <100  Comment: Patient last LDL was 86 on 6/21/2018  Plan: lovastatin (MEVACOR) 40 MG tablet        Continue Lovastatin 40 mg tablet    (I48.0) Paroxysmal atrial fibrillation (H)  Comment: Currently stable, being managed by his cardiologist. Rate control asymptomatic  Plan: **Basic metabolic panel FUTURE anytime        Follow up with     (E03.9) Acquired hypothyroidism  Comment: Controlled and asymptomatic, elevated TSH to 53 " from previous test  Plan: levothyroxine (SYNTHROID/LEVOTHROID) 75 MCG         tablet, **TSH with free T4 reflex FUTURE         anytime        Continue Synthroid and increase to 1 and half tablet at 75 mg, follow up in January for labs    (N18.3) CKD (chronic kidney disease) stage 3, GFR 30-59 ml/min (H)  Comment: stable, unchanged   Plan: losartan (COZAAR) 100 MG tablet        Continue Losartan 100 mg    (E03.9) Hypothyroidism, unspecified type  Comment: Stable, asymptomatic  Plan: levothyroxine (SYNTHROID/LEVOTHROID) 50 MCG         tablet        Follow up in January for Labs      Patient Instructions       Preventive Health Recommendations:     Break the Thyroid medication in half. And Take 1 and half daily. Increase to 75 micrograms and follow up in end of January for Lab    Consider shingles vaccine. It may be best to get at pharmacy to know the cost. Your need 2 shots with the second sometime 2 months after the first.       See your health care provider every year to    Review health changes.     Discuss preventive care.      Review your medicines if your doctor has prescribed any.    Talk with your health care provider about whether you should have a test to screen for prostate cancer (PSA).    Every 3 years, have a diabetes test (fasting glucose). If you are at risk for diabetes, you should have this test more often.    Every 5 years, have a cholesterol test. Have this test more often if you are at risk for high cholesterol or heart disease.     Every 10 years, have a colonoscopy. Or, have a yearly FIT test (stool test). These exams will check for colon cancer.    Talk to with your health care provider about screening for Abdominal Aortic Aneurysm if you have a family history of AAA or have a history of smoking.  Shots:     Get a flu shot each year.     Get a tetanus shot every 10 years.     Talk to your doctor about your pneumonia vaccines. There are now two you should receive - Pneumovax (PPSV 23) and  "Prevnar (PCV 13).    Talk to your pharmacist about a shingles vaccine.     Talk to your doctor about the hepatitis B vaccine.  Nutrition:     Eat at least 5 servings of fruits and vegetables each day.     Eat whole-grain bread, whole-wheat pasta and brown rice instead of white grains and rice.     Get adequate Calcium and Vitamin D.   Lifestyle    Exercise for at least 150 minutes a week (30 minutes a day, 5 days a week). This will help you control your weight and prevent disease.     Limit alcohol to one drink per day.     No smoking.     Wear sunscreen to prevent skin cancer.     See your dentist every six months for an exam and cleaning.     See your eye doctor every 1 to 2 years to screen for conditions such as glaucoma, macular degeneration and cataracts.    Personalized Prevention Plan  You are due for the preventive services outlined below.  Your care team is available to assist you in scheduling these services.  If you have already completed any of these items, please share that information with your care team to update in your medical record.    Health Maintenance Due   Topic Date Due     Zoster (Chicken Pox) Vaccine (2 of 3) 10/15/2007     Microalbumin Lab - yearly  12/07/2018     FALL RISK ASSESSMENT  12/14/2018         End of Life Planning:  Patient currently has an advanced directive:  Yes: Advance Directive has been received and scanned.    COUNSELING:  Reviewed preventive health counseling, as reflected in patient instructions       Regular exercise       Healthy diet/nutrition       Immunizations    Vaccinated for: Zoster          BP Readings from Last 1 Encounters:   12/31/18 122/76     Estimated body mass index is 33.86 kg/m  as calculated from the following:    Height as of this encounter: 5' 11.25\" (1.81 m).    Weight as of this encounter: 244 lb 8 oz (110.9 kg).      Weight management plan: Discussed healthy diet and exercise guidelines     reports that  has never smoked. He quit smokeless " tobacco use about 18 years ago. His smokeless tobacco use included chew.      Appropriate preventive services were discussed with this patient, including applicable screening as appropriate for cardiovascular disease, diabetes, osteopenia/osteoporosis, and glaucoma.  As appropriate for age/gender, discussed screening for colorectal cancer, prostate cancer, breast cancer, and cervical cancer. Checklist reviewing preventive services available has been given to the patient.    Reviewed patients plan of care and provided an AVS. The Basic Care Plan (routine screening as documented in Health Maintenance) for Sami meets the Care Plan requirement. This Care Plan has been established and reviewed with the Patient.    Counseling Resources:  ATP IV Guidelines  Pooled Cohorts Equation Calculator  Breast Cancer Risk Calculator  FRAX Risk Assessment  ICSI Preventive Guidelines  Dietary Guidelines for Americans, 2010  USDA's MyPlate  ASA Prophylaxis  Lung CA Screening    Clemente Albarran MD, MD  [facility]

## 2019-01-14 ENCOUNTER — TELEPHONE (OUTPATIENT)
Dept: CARDIOLOGY | Facility: CLINIC | Age: 79
End: 2019-01-14

## 2019-01-14 NOTE — TELEPHONE ENCOUNTER
Hi team I had Sami Harmon down here at the  2019 trying to get this bill taken care of. What I see is that he had the ZioPatch put on2018 do that suppose to be the date of service. What happen when the bill was sent in the date of service say 01/10/2019. At the time the patch was put on he had [facility]. When the bill was sent out it says that the date of service was for 01/10/2019 that policy  on 2018 and the new one Started on 2019. So what can we do about this for him. I am bring up the paper work that he left me for you to look at. Thank you

## 2019-01-17 ENCOUNTER — MYC MEDICAL ADVICE (OUTPATIENT)
Dept: CARDIOLOGY | Facility: CLINIC | Age: 79
End: 2019-01-17

## 2019-01-17 DIAGNOSIS — I48.0 PAROXYSMAL ATRIAL FIBRILLATION (H): ICD-10-CM

## 2019-01-18 NOTE — TELEPHONE ENCOUNTER
Spoke with patient and was informed he already spoke with someone and discussed his billing for his Zio monitor. AVEL Finn

## 2019-01-23 ENCOUNTER — MYC MEDICAL ADVICE (OUTPATIENT)
Dept: CARDIOLOGY | Facility: CLINIC | Age: 79
End: 2019-01-23

## 2019-01-23 RX ORDER — METOPROLOL TARTRATE 50 MG
TABLET ORAL
Qty: 180 TABLET | Refills: 1 | Status: SHIPPED | OUTPATIENT
Start: 2019-01-23 | End: 2019-03-22

## 2019-01-23 NOTE — TELEPHONE ENCOUNTER
Patient was called with ZIO result per Dr Cornelius. Patient was instructed to increase Metoprolol tartrate  to 25 mg twice daily and if no symptoms to increase it to 50 mg Twice daily in 2-3 weeks.    Writer instructed patient to call some time next week to update clinic on how he feels.    Refill sent to patient preferred pharmacy. Message is sent for patient on My chart as well.         Notes recorded per Dr Cornelius  Message from Benji Cornelius MD sent at 1/22/2019  9:10 PM CST -----  Hans Rainey,  He does have paroxysmal a-fib, 62% burden. As the next step I would increase his metoprolol to 25mg BID and if no symptoms, consider increasing this to 50mg BID in 2-3 weeks.       Fatemeh Salmon RN

## 2019-01-23 NOTE — TELEPHONE ENCOUNTER
----- Message from Benji Cornelius MD sent at 1/22/2019  9:10 PM CST -----  Hi Brigid,  He does have paroxysmal a-fib, 62% burden. As the next step I would increase his metoprolol to 25mg BID and if no symptoms, consider increasing this to 50mg BID in 2-3 weeks.  Thanks  Benji      ----- Message -----  From: Brigid Webber RN  Sent: 1/21/2019   6:33 PM  To: Benji Cornelius MD    Please review zio results and advise.  Thanks:) Brigid

## 2019-01-29 ENCOUNTER — TELEPHONE (OUTPATIENT)
Dept: FAMILY MEDICINE | Facility: CLINIC | Age: 79
End: 2019-01-29

## 2019-01-29 DIAGNOSIS — E03.9 ACQUIRED HYPOTHYROIDISM: ICD-10-CM

## 2019-01-29 DIAGNOSIS — I48.0 PAROXYSMAL ATRIAL FIBRILLATION (H): ICD-10-CM

## 2019-01-29 DIAGNOSIS — I10 HYPERTENSION GOAL BP (BLOOD PRESSURE) < 140/90: ICD-10-CM

## 2019-01-29 DIAGNOSIS — E03.9 ACQUIRED HYPOTHYROIDISM: Primary | ICD-10-CM

## 2019-01-29 DIAGNOSIS — N18.30 CKD (CHRONIC KIDNEY DISEASE) STAGE 3, GFR 30-59 ML/MIN (H): ICD-10-CM

## 2019-01-29 LAB
ANION GAP SERPL CALCULATED.3IONS-SCNC: 4 MMOL/L (ref 3–14)
BUN SERPL-MCNC: 16 MG/DL (ref 7–30)
CALCIUM SERPL-MCNC: 9.1 MG/DL (ref 8.5–10.1)
CHLORIDE SERPL-SCNC: 106 MMOL/L (ref 94–109)
CO2 SERPL-SCNC: 26 MMOL/L (ref 20–32)
CREAT SERPL-MCNC: 1.38 MG/DL (ref 0.66–1.25)
CREAT UR-MCNC: 58 MG/DL
GFR SERPL CREATININE-BSD FRML MDRD: 48 ML/MIN/{1.73_M2}
GLUCOSE SERPL-MCNC: 90 MG/DL (ref 70–99)
MICROALBUMIN UR-MCNC: 5 MG/L
MICROALBUMIN/CREAT UR: 9.26 MG/G CR (ref 0–17)
POTASSIUM SERPL-SCNC: 4.2 MMOL/L (ref 3.4–5.3)
SODIUM SERPL-SCNC: 136 MMOL/L (ref 133–144)
T4 FREE SERPL-MCNC: 0.81 NG/DL (ref 0.76–1.46)
TSH SERPL DL<=0.005 MIU/L-ACNC: 26.06 MU/L (ref 0.4–4)

## 2019-01-29 PROCEDURE — 80048 BASIC METABOLIC PNL TOTAL CA: CPT | Performed by: FAMILY MEDICINE

## 2019-01-29 PROCEDURE — 36415 COLL VENOUS BLD VENIPUNCTURE: CPT | Performed by: FAMILY MEDICINE

## 2019-01-29 PROCEDURE — 84439 ASSAY OF FREE THYROXINE: CPT | Performed by: FAMILY MEDICINE

## 2019-01-29 PROCEDURE — 84443 ASSAY THYROID STIM HORMONE: CPT | Performed by: FAMILY MEDICINE

## 2019-01-29 PROCEDURE — 82043 UR ALBUMIN QUANTITATIVE: CPT | Performed by: FAMILY MEDICINE

## 2019-01-29 RX ORDER — LEVOTHYROXINE SODIUM 100 UG/1
100 TABLET ORAL DAILY
Qty: 30 TABLET | Refills: 0 | COMMUNITY
Start: 2019-01-29 | End: 2019-02-28 | Stop reason: DRUGHIGH

## 2019-01-29 NOTE — TELEPHONE ENCOUNTER
Called and reviewed. We will increase to 100 mcg. He has 75 mcg and 50 mcg at home    He will take the 75 mcg an 1/2 of the 50 mcg  Recheck in 1 month

## 2019-02-12 NOTE — TELEPHONE ENCOUNTER
My chart message sent to patient to switch to Metoprolol 1 tablet 50 mg twice daily per Dr Cornelius see previous encounter.    Fatemeh Salmon RN

## 2019-02-26 DIAGNOSIS — E03.9 ACQUIRED HYPOTHYROIDISM: ICD-10-CM

## 2019-02-26 PROCEDURE — 84439 ASSAY OF FREE THYROXINE: CPT | Performed by: FAMILY MEDICINE

## 2019-02-26 PROCEDURE — 36415 COLL VENOUS BLD VENIPUNCTURE: CPT | Performed by: FAMILY MEDICINE

## 2019-02-26 PROCEDURE — 84443 ASSAY THYROID STIM HORMONE: CPT | Performed by: FAMILY MEDICINE

## 2019-02-27 LAB
T4 FREE SERPL-MCNC: 0.86 NG/DL (ref 0.76–1.46)
TSH SERPL DL<=0.005 MIU/L-ACNC: 22.19 MU/L (ref 0.4–4)

## 2019-02-28 ENCOUNTER — TELEPHONE (OUTPATIENT)
Dept: FAMILY MEDICINE | Facility: CLINIC | Age: 79
End: 2019-02-28

## 2019-02-28 DIAGNOSIS — E03.9 ACQUIRED HYPOTHYROIDISM: ICD-10-CM

## 2019-02-28 RX ORDER — LEVOTHYROXINE SODIUM 150 UG/1
150 TABLET ORAL DAILY
Qty: 60 TABLET | Refills: 5 | Status: SHIPPED | OUTPATIENT
Start: 2019-02-28 | End: 2019-06-06 | Stop reason: DRUGHIGH

## 2019-02-28 NOTE — TELEPHONE ENCOUNTER
Called and left message. Need to increase dose.    Orders Placed This Encounter     **TSH with free T4 reflex FUTURE anytime     Last Lab Result: TSH (mU/L)       Date                     Value                 02/26/2019               22.19 (H)        ----------     Standing Status:   Future     Standing Expiration Date:   2/28/2020     levothyroxine (SYNTHROID/LEVOTHROID) 150 MCG tablet     Sig: Take 1 tablet (150 mcg) by mouth daily     Dispense:  60 tablet     Refill:  5

## 2019-03-22 ENCOUNTER — OFFICE VISIT (OUTPATIENT)
Dept: CARDIOLOGY | Facility: CLINIC | Age: 79
End: 2019-03-22
Payer: MEDICARE

## 2019-03-22 VITALS
DIASTOLIC BLOOD PRESSURE: 77 MMHG | BODY MASS INDEX: 34.62 KG/M2 | HEART RATE: 72 BPM | SYSTOLIC BLOOD PRESSURE: 117 MMHG | WEIGHT: 250 LBS | OXYGEN SATURATION: 98 %

## 2019-03-22 DIAGNOSIS — I48.0 PAROXYSMAL ATRIAL FIBRILLATION (H): Primary | ICD-10-CM

## 2019-03-22 DIAGNOSIS — I10 BENIGN ESSENTIAL HYPERTENSION: ICD-10-CM

## 2019-03-22 DIAGNOSIS — E78.2 MIXED HYPERLIPIDEMIA: ICD-10-CM

## 2019-03-22 DIAGNOSIS — E03.9 HYPOTHYROIDISM, UNSPECIFIED TYPE: ICD-10-CM

## 2019-03-22 PROCEDURE — 99214 OFFICE O/P EST MOD 30 MIN: CPT | Performed by: INTERNAL MEDICINE

## 2019-03-22 RX ORDER — LATANOPROST 50 UG/ML
1 SOLUTION/ DROPS OPHTHALMIC DAILY
COMMUNITY
End: 2024-02-26

## 2019-03-22 RX ORDER — METOPROLOL TARTRATE 50 MG
TABLET ORAL
Qty: 180 TABLET | Refills: 1 | Status: SHIPPED | OUTPATIENT
Start: 2019-03-22 | End: 2019-08-27

## 2019-03-22 NOTE — NURSING NOTE
"Chief Complaint   Patient presents with     Atrial Fib     OV with Dr. Benji Cornelius for 3 month follow up for A. Fib. Medicine management       Initial /77 (BP Location: Left arm, Patient Position: Sitting, Cuff Size: Adult Large)   Pulse 72   Wt 113.4 kg (250 lb)   SpO2 98%   BMI 34.62 kg/m   Estimated body mass index is 34.62 kg/m  as calculated from the following:    Height as of 12/31/18: 1.81 m (5' 11.25\").    Weight as of this encounter: 113.4 kg (250 lb)..  BP completed using cuff size: large    Ivette Chavez L.P.N.  Ekg. Test procedure explained to patient .Ekg.test performed today per Provider order.Then Ekg. Result relayed  to provider for review.  Ivette Chavez L.P.N.          "

## 2019-03-22 NOTE — PATIENT INSTRUCTIONS
Thank you for coming to the [facility] @ [facility]; please note the following instructions:    1. MEDICATION CHANGES TODAY:    ,Your new prescription will be at the Pharmacy you prefer.      * INCREASE Metoprolol to 75 mg. Twice a day    2.Dr. Benji Cornelius has requested you to follow up in 3 month ; please see following pages for appointment detail information.                  If you have any questions regarding your visit please contact your care team:     Cardiology  Telephone Number   Brigid KULKARNI, PERRI SOOD,RN  Kasey MATIAS, AVEL SOOD, MILADIS SHEEHAN, LPN   [phone]    *After hours: [phone]   For scheduling appts:     [phone] or    [phone] (select option [phone])    *After hours: [phone]     For the Device Clinic (Pacemakers and ICD's)  RN's :  Gali Proctor   During business hours: [phone]    *After business hours:  [phone] (select option 4)      Normal test result notifications will be released via [email or web address] or mailed within 7 business days.  All other test results, will be communicated via telephone once reviewed by your cardiologist.    If you need a medication refill please contact your pharmacy.  Please allow 3 business days for your refill to be completed.    As always, thank you for trusting us with your health care needs!

## 2019-03-22 NOTE — LETTER
3/22/2019      RE: Sami Harmon  [address] [place] [id number]       Dear Colleague,    Thank you for the opportunity to participate in the care of your patient, Sami Harmon, at the [facility] HEART AT [facility] at [facility]. Please see a copy of my visit note below.    March 22, 2019     Faustino Resendiz is an 77 yo gentleman with minimal past medical history including HTN, HL, who was recently seen by primary care provider and then by myself for atrial fibrillation. He was checking his blood pressure at home and he did notice that his blood pressure cuff shows irregular heart rate.  This prompted him asking his primary care provider about the regular heart rate and disease how his atrial fibrillation was discovered again he is completely symptomatic. He then came to cardiology clinic, was in sinus rhythm. Was started on low dose metoprolol and Ziopatch has been placed. Today he returns to follow up on these.  He offers no new complaints noted that he feels well, he does not feel palpitations or racing of the heart.  He is completely asymptomatic from the atrial fibrillation standpoint.  Denies any dizziness lightheadedness no falls or trauma.  He takes apixaban as scheduled and denies any complaints associated with including bleeding.  no other complaints.    PAST MEDICAL HISTORY:  Past Medical History:   Diagnosis Date     Acquired hypothyroidism 12/31/2018     Arthritis 2010     Essential hypertension, benign 2004     Macular degeneration 1980s     Paroxysmal atrial fibrillation (H) 12/31/2018     Pure hypercholesterolemia 2004    goal ldl <100     FAMILY HISTORY:  Family History   Problem Relation Age of Onset     Arthritis Mother      Eye Disorder Mother      Diabetes Mother         type 2     Hypertension Mother      Cancer - colorectal Mother      Cerebrovascular Disease Mother      Osteoporosis Mother      Obesity Mother       Colon Cancer Mother      Heart Disease Father      Prostate Cancer Father      Cerebrovascular Disease Father      Heart Disease Brother      Prostate Cancer Brother      Coronary Artery Disease Brother      Gastrointestinal Disease Son      SOCIAL HISTORY:  Social History     Socioeconomic History     Marital status:      Spouse name: Meredith     Number of children: 4     Years of education: Not on file     Highest education level: Not on file   Occupational History     Occupation:      Employer: RETIRED   Social Needs     Financial resource strain: Not on file     Food insecurity:     Worry: Not on file     Inability: Not on file     Transportation needs:     Medical: Not on file     Non-medical: Not on file   Tobacco Use     Smoking status: Never Smoker     Smokeless tobacco: Former User     Types: Chew     Tobacco comment: Chewed tobacco for 30 years   Substance and Sexual Activity     Alcohol use: Yes     Comment: 1 or less drink per day     Drug use: No     Sexual activity: No     Partners: Female   Lifestyle     Physical activity:     Days per week: Not on file     Minutes per session: Not on file     Stress: Not on file   Relationships     Social connections:     Talks on phone: Not on file     Gets together: Not on file     Attends Orthodoxy service: Not on file     Active member of club or organization: Not on file     Attends meetings of clubs or organizations: Not on file     Relationship status: Not on file     Intimate partner violence:     Fear of current or ex partner: Not on file     Emotionally abused: Not on file     Physically abused: Not on file     Forced sexual activity: Not on file   Other Topics Concern     Parent/sibling w/ CABG, MI or angioplasty before 65F 55M? No      Service No     Blood Transfusions No     Caffeine Concern No     Occupational Exposure No     Hobby Hazards No     Sleep Concern No     Stress Concern No     Weight Concern No     Special Diet No      Back Care No     Exercise No     Bike Helmet No     Seat Belt Yes     Self-Exams Yes   Social History Narrative     Not on file     CURRENT MEDICATIONS:  Current Outpatient Medications   Medication     apixaban ANTICOAGULANT (ELIQUIS) 5 MG tablet     Ferrous Sulfate (IRON SUPPLEMENT PO)     levothyroxine (SYNTHROID/LEVOTHROID) 150 MCG tablet     losartan (COZAAR) 100 MG tablet     lovastatin (MEVACOR) 40 MG tablet     LUTEIN 6 MG CAPS     metoprolol tartrate (LOPRESSOR) 50 MG tablet     MULTI-VITAMIN OR     OMEGA 3 1000 MG OR CAPS     Psyllium (METAMUCIL PO)     vitamin B-12 (CYANOCOBALAMIN) 500 MCG tablet     No current facility-administered medications for this visit.       ROS:   Constitutional: No fever, chills, or sweats.  ENT: No visual disturbance, ear ache, epistaxis, sore throat.   Allergies/Immunologic: Negative.   Respiratory: No cough, hemoptysis.   Cardiovascular: As per HPI.   GI: No nausea, vomiting, hematemesis, melena, or hematochezia.   : No urinary frequency, dysuria, or hematuria.   Integrument: Negative.   Psychiatric: No evidence of major depression  Neuro: No new neurological complaints at this time. Non focal  Endocrinology: Negative.   Musculoskeletal: As per HPI.      EXAM:  /77 (BP Location: Left arm, Patient Position: Sitting, Cuff Size: Adult Large)   Pulse 72   Wt 113.4 kg (250 lb)   SpO2 98%   BMI 34.62 kg/m     General: appears comfortable, alert and oriented  Head: normocephalic, atraumatic  Eyes: anicteric sclera, EOMI , PERRL  Neck: no adenopathy  Orophyarynx: moist mucosa, no lesions noted  Heart: regular, S1/S2, no murmurs, rubs or gallop. Estimated JVP at 5 cmH2O  Lungs: CTAB, No wheezing.   Abdomen: soft, non-tender, bowel sounds present, no hepatosplenomegaly  Extremities: No LE edema today  Skin: no open lesions noted  Neuro: grossly non-focal  Psych: no evidence of depression noted     Labs:  Lab Results   Component Value Date    WBC 4.5 12/11/2018    HGB  12.4 (L) 12/11/2018    HCT 36.4 (L) 12/11/2018     12/11/2018     01/29/2019    POTASSIUM 4.2 01/29/2019    CHLORIDE 106 01/29/2019    CO2 26 01/29/2019    BUN 16 01/29/2019    CR 1.38 (H) 01/29/2019    GLC 90 01/29/2019    AST 38 08/26/2009    ALT 21 09/28/2011    INR 1.0 06/04/2015     Echocardiogram reviewed 12/2018:  Global and regional left ventricular function is normal with an EF of 55-60%.  The right ventricle is normal size. Global right ventricular function is  Normal. Mild aortic valve sclerosis is present. The proximal Asc Aorta measures 4.2cm in diameter. The aortic root index is 1.8cm/m2 (Normal index for males is 1.4-1.7cm/m2) No pericardial effusion is present.     Ziopatch 3/2019:   62% a-fib burden, mostly rate controlled.     ASSESSMENT AND PLAN:  In summary, patient is a 78 year old history of hypertension hyperlipidemia and paroxysmal atrial fibrillation on apixaban who presented to the cardiology clinic to follow up on recent zio monitoring results. Echocardiogram showed normal ejection fraction and he does not have any symptoms pertinent to heart failure.  His  ChadsVasc score is 3 given his age and hypertension this will put him at 3.2% annual risk of stroke with atrial fibrillation.  He will continue his apixaban at this point given his paroxysmal episodes of atrial fibrillation.  I agree with apixaban 5 mg twice daily dosing however we have to monitor his renal function intermittently as apixaban dose should be reduced if 2 of the following criteria met 1) age above 80 years old, 2) weight less than 60 kg which is not, 3) creatinine about 1.5.  So if his creatinine reaches 1.5 or higher I would decrease his apixaban dose to 2.5 mg twice daily dosing.  Will increase metoprolol to 75 mg twice daily dosing continue his antihypertensives.  Will escalate metoprolol over the next couple of weeks as able. Will need ongoing management for his hypothyroidism.     Follow up:    3-month     I appreciate the opportunity to participate in the care of Sami Harmon . Please do not hesitate to contact me with any further questions.    Benji Cornelius MD

## 2019-04-11 DIAGNOSIS — E03.9 ACQUIRED HYPOTHYROIDISM: ICD-10-CM

## 2019-04-11 PROCEDURE — 84443 ASSAY THYROID STIM HORMONE: CPT | Performed by: FAMILY MEDICINE

## 2019-04-11 PROCEDURE — 36415 COLL VENOUS BLD VENIPUNCTURE: CPT | Performed by: FAMILY MEDICINE

## 2019-04-11 PROCEDURE — 84439 ASSAY OF FREE THYROXINE: CPT | Performed by: FAMILY MEDICINE

## 2019-04-12 ENCOUNTER — TELEPHONE (OUTPATIENT)
Dept: FAMILY MEDICINE | Facility: CLINIC | Age: 79
End: 2019-04-12

## 2019-04-12 DIAGNOSIS — E03.9 HYPOTHYROIDISM, UNSPECIFIED TYPE: Primary | ICD-10-CM

## 2019-04-12 LAB
T4 FREE SERPL-MCNC: 0.95 NG/DL (ref 0.76–1.46)
TSH SERPL DL<=0.005 MIU/L-ACNC: 17.81 MU/L (ref 0.4–4)

## 2019-04-12 RX ORDER — LEVOTHYROXINE SODIUM 175 UG/1
175 TABLET ORAL DAILY
Qty: 90 TABLET | Refills: 3 | Status: SHIPPED | OUTPATIENT
Start: 2019-04-12 | End: 2019-06-06 | Stop reason: DRUGHIGH

## 2019-04-12 NOTE — TELEPHONE ENCOUNTER
Please call patient. We need to increase his thyroid medication.    Orders Placed This Encounter     levothyroxine (SYNTHROID/LEVOTHROID) 175 MCG tablet     Sig: Take 1 tablet (175 mcg) by mouth daily     Dispense:  90 tablet     Refill:  3     Recheck TSH in 6-8 weeks order placed    Orders Placed This Encounter     TSH     Last Lab Result: TSH (mU/L)       Date                     Value                 04/11/2019               17.81 (H)        ----------     Standing Status:   Future     Standing Expiration Date:   4/12/2020     levothyroxine (SYNTHROID/LEVOTHROID) 175 MCG tablet     Sig: Take 1 tablet (175 mcg) by mouth daily     Dispense:  90 tablet     Refill:  3

## 2019-06-06 ENCOUNTER — TELEPHONE (OUTPATIENT)
Dept: FAMILY MEDICINE | Facility: CLINIC | Age: 79
End: 2019-06-06

## 2019-06-06 DIAGNOSIS — E03.9 HYPOTHYROIDISM, UNSPECIFIED TYPE: ICD-10-CM

## 2019-06-06 DIAGNOSIS — E03.9 ACQUIRED HYPOTHYROIDISM: Primary | ICD-10-CM

## 2019-06-06 LAB — TSH SERPL DL<=0.005 MIU/L-ACNC: 11.09 MU/L (ref 0.4–4)

## 2019-06-06 PROCEDURE — 36415 COLL VENOUS BLD VENIPUNCTURE: CPT | Performed by: FAMILY MEDICINE

## 2019-06-06 PROCEDURE — 84443 ASSAY THYROID STIM HORMONE: CPT | Performed by: FAMILY MEDICINE

## 2019-06-06 RX ORDER — LEVOTHYROXINE SODIUM 200 UG/1
200 TABLET ORAL DAILY
Qty: 90 TABLET | Refills: 3 | Status: SHIPPED | OUTPATIENT
Start: 2019-06-06 | End: 2020-05-28

## 2019-06-06 RX ORDER — LEVOTHYROXINE SODIUM 175 UG/1
175 TABLET ORAL DAILY
Qty: 90 TABLET | Refills: 3 | Status: SHIPPED | OUTPATIENT
Start: 2019-06-06 | End: 2019-06-06 | Stop reason: DRUGHIGH

## 2019-06-06 RX ORDER — LEVOTHYROXINE SODIUM 200 UG/1
200 TABLET ORAL DAILY
Qty: 90 TABLET | Refills: 3 | Status: SHIPPED | OUTPATIENT
Start: 2019-06-06 | End: 2019-06-06

## 2019-06-07 NOTE — TELEPHONE ENCOUNTER
Called and reviewed  We will increase dose of thyroid medication     Initially sent to [facility] pharmacy but patient states he needs to change pharmacy to [facility]    I will forward this to [facility] pharmacy so they are aware of cancelling order to them    Recheck TSH in 2 months

## 2019-06-17 ENCOUNTER — MYC MEDICAL ADVICE (OUTPATIENT)
Dept: FAMILY MEDICINE | Facility: CLINIC | Age: 79
End: 2019-06-17

## 2019-06-27 DIAGNOSIS — E03.9 ACQUIRED HYPOTHYROIDISM: ICD-10-CM

## 2019-06-27 DIAGNOSIS — Z12.5 SPECIAL SCREENING FOR MALIGNANT NEOPLASM OF PROSTATE: ICD-10-CM

## 2019-06-27 DIAGNOSIS — E78.5 HYPERLIPIDEMIA LDL GOAL <100: ICD-10-CM

## 2019-06-27 DIAGNOSIS — Z80.42 FAMILY HISTORY OF PROSTATE CANCER: ICD-10-CM

## 2019-06-27 LAB
T4 FREE SERPL-MCNC: 1.15 NG/DL (ref 0.76–1.46)
TSH SERPL DL<=0.005 MIU/L-ACNC: 11.11 MU/L (ref 0.4–4)

## 2019-06-27 PROCEDURE — G0103 PSA SCREENING: HCPCS | Performed by: FAMILY MEDICINE

## 2019-06-27 PROCEDURE — 80061 LIPID PANEL: CPT | Performed by: FAMILY MEDICINE

## 2019-06-27 PROCEDURE — 36415 COLL VENOUS BLD VENIPUNCTURE: CPT | Performed by: FAMILY MEDICINE

## 2019-06-27 PROCEDURE — 84443 ASSAY THYROID STIM HORMONE: CPT | Performed by: FAMILY MEDICINE

## 2019-06-27 PROCEDURE — 84439 ASSAY OF FREE THYROXINE: CPT | Performed by: FAMILY MEDICINE

## 2019-06-28 ENCOUNTER — MYC MEDICAL ADVICE (OUTPATIENT)
Dept: FAMILY MEDICINE | Facility: CLINIC | Age: 79
End: 2019-06-28

## 2019-06-28 ENCOUNTER — OFFICE VISIT (OUTPATIENT)
Dept: CARDIOLOGY | Facility: CLINIC | Age: 79
End: 2019-06-28
Payer: MEDICARE

## 2019-06-28 VITALS
OXYGEN SATURATION: 97 % | BODY MASS INDEX: 34.35 KG/M2 | DIASTOLIC BLOOD PRESSURE: 84 MMHG | SYSTOLIC BLOOD PRESSURE: 125 MMHG | HEART RATE: 49 BPM | WEIGHT: 248 LBS

## 2019-06-28 DIAGNOSIS — E78.2 MIXED HYPERLIPIDEMIA: ICD-10-CM

## 2019-06-28 DIAGNOSIS — E03.9 HYPOTHYROIDISM, UNSPECIFIED TYPE: ICD-10-CM

## 2019-06-28 DIAGNOSIS — I10 BENIGN ESSENTIAL HYPERTENSION: ICD-10-CM

## 2019-06-28 DIAGNOSIS — E78.5 HYPERLIPIDEMIA LDL GOAL <100: ICD-10-CM

## 2019-06-28 DIAGNOSIS — Z12.5 SPECIAL SCREENING FOR MALIGNANT NEOPLASM OF PROSTATE: ICD-10-CM

## 2019-06-28 DIAGNOSIS — N18.30 CKD (CHRONIC KIDNEY DISEASE) STAGE 3, GFR 30-59 ML/MIN (H): ICD-10-CM

## 2019-06-28 DIAGNOSIS — Z80.42 FAMILY HISTORY OF PROSTATE CANCER: Primary | ICD-10-CM

## 2019-06-28 DIAGNOSIS — I48.0 PAROXYSMAL ATRIAL FIBRILLATION (H): Primary | ICD-10-CM

## 2019-06-28 PROCEDURE — 99214 OFFICE O/P EST MOD 30 MIN: CPT | Performed by: INTERNAL MEDICINE

## 2019-06-28 NOTE — PATIENT INSTRUCTIONS
Thank you for coming to the [facility] @ [place] [facility]; please note the following instructions:    1. 6 month follow up appointment.         If you have any questions regarding your visit please contact your care team:     Cardiology  Telephone Number   Brigid KULKARNI, PERRI DINERO,RN  Kasey MATIAS, AVEL SOOD, MA  Ivette SHEEHAN LPN   [phone]    *After hours: [phone]   For scheduling appts:     [phone] or    [phone] (select option 1)    *After hours: [phone]     For the Device Clinic (Pacemakers and ICD's)  RN's :  Gali Proctor   During business hours: [phone]    *After business hours:  [phone] (select option 4)      Normal test result notifications will be released via [employer] or mailed within 7 business days.  All other test results, will be communicated via telephone once reviewed by your cardiologist.    If you need a medication refill please contact your pharmacy.  Please allow 3 business days for your refill to be completed.    As always, thank you for trusting us with your health care needs!

## 2019-06-28 NOTE — NURSING NOTE
"Chief Complaint   Patient presents with     Atrial Fib     3 month follow up for PAF. -  Per patient no bothersome symptoms       Initial /84 (BP Location: Left arm, Patient Position: Sitting, Cuff Size: Adult Large)   Pulse (!) 49   Wt 112.5 kg (248 lb)   SpO2 97%   BMI 34.35 kg/m   Estimated body mass index is 34.35 kg/m  as calculated from the following:    Height as of 12/31/18: 1.81 m (5' 11.25\").    Weight as of this encounter: 112.5 kg (248 lb)..  BP completed using cuff size: large    Ivette Chavez L.P.N.    "

## 2019-06-28 NOTE — LETTER
6/28/2019      RE: Sami Harmon  [address] [place] [id number]       Dear Colleague,    Thank you for the opportunity to participate in the care of your patient, Sami Harmon, at the [facility] HEART AT [facility] at [facility]. Please see a copy of my visit note below.    June 28, 2019     Faustino Resendiz is an 79 yo gentleman with minimal past medical history including HTN, HL, who was recently seen by primary care provider and then by myself for atrial fibrillation. He was checking his blood pressure at home and he did notice that his blood pressure cuff shows irregular heart rate.  This prompted him asking his primary care provider about the regular heart rate and disease how his atrial fibrillation was discovered again he is completely symptomatic. He then came to cardiology clinic, was in sinus rhythm. Was started on low dose metoprolol and Ziopatch has been placed. Today he returns to follow up  No new complaints he has been doing very well.  No side effects from the apixaban.  No dizziness lightheadedness no syncope.  No other complaints.    PAST MEDICAL HISTORY:  Past Medical History:   Diagnosis Date     Acquired hypothyroidism 12/31/2018     Arthritis 2010     Essential hypertension, benign 2004     Macular degeneration 1980s     Paroxysmal atrial fibrillation (H) 12/31/2018     Pure hypercholesterolemia 2004    goal ldl <100     FAMILY HISTORY:  Family History   Problem Relation Age of Onset     Arthritis Mother      Eye Disorder Mother      Diabetes Mother         type 2     Hypertension Mother      Cancer - colorectal Mother      Cerebrovascular Disease Mother      Osteoporosis Mother      Obesity Mother      Colon Cancer Mother      Heart Disease Father      Prostate Cancer Father      Cerebrovascular Disease Father      Heart Disease Brother      Prostate Cancer Brother      Coronary Artery Disease Brother       Gastrointestinal Disease Son      SOCIAL HISTORY:  Social History     Socioeconomic History     Marital status:      Spouse name: Meredith     Number of children: 4     Years of education: None     Highest education level: None   Occupational History     Occupation:      Employer: RETIRED   Social Needs     Financial resource strain: None     Food insecurity:     Worry: None     Inability: None     Transportation needs:     Medical: None     Non-medical: None   Tobacco Use     Smoking status: Never Smoker     Smokeless tobacco: Former User     Types: Chew     Tobacco comment: Chewed tobacco for 30 years   Substance and Sexual Activity     Alcohol use: Yes     Comment: 1 or less drink per day     Drug use: No     Sexual activity: Never     Partners: Female   Lifestyle     Physical activity:     Days per week: None     Minutes per session: None     Stress: None   Relationships     Social connections:     Talks on phone: None     Gets together: None     Attends Muslim service: None     Active member of club or organization: None     Attends meetings of clubs or organizations: None     Relationship status: None     Intimate partner violence:     Fear of current or ex partner: None     Emotionally abused: None     Physically abused: None     Forced sexual activity: None   Other Topics Concern     Parent/sibling w/ CABG, MI or angioplasty before 65F 55M? No      Service No     Blood Transfusions No     Caffeine Concern No     Occupational Exposure No     Hobby Hazards No     Sleep Concern No     Stress Concern No     Weight Concern No     Special Diet No     Back Care No     Exercise No     Bike Helmet No     Seat Belt Yes     Self-Exams Yes   Social History Narrative     None     CURRENT MEDICATIONS:  Current Outpatient Medications   Medication     apixaban ANTICOAGULANT (ELIQUIS) 5 MG tablet     Ferrous Sulfate (IRON SUPPLEMENT PO)     latanoprost (XALATAN) 0.005 % ophthalmic solution      levothyroxine (SYNTHROID/LEVOTHROID) 200 MCG tablet     losartan (COZAAR) 100 MG tablet     lovastatin (MEVACOR) 40 MG tablet     LUTEIN 6 MG CAPS     metoprolol tartrate (LOPRESSOR) 50 MG tablet     MULTI-VITAMIN OR     OMEGA 3 1000 MG OR CAPS     Psyllium (METAMUCIL PO)     vitamin B-12 (CYANOCOBALAMIN) 500 MCG tablet     No current facility-administered medications for this visit.      ROS:   Constitutional: No fever, chills, or sweats. Weight is 248 lbs 0 oz  ENT: No visual disturbance, ear ache, epistaxis, sore throat.   Allergies/Immunologic: Negative.   Respiratory: No cough, hemoptysis.   Cardiovascular: As per HPI.   GI: No nausea, vomiting, hematemesis, melena, or hematochezia.   : No urinary frequency, dysuria, or hematuria.   Integrument: Negative.   Psychiatric: No evidence of major depression  Neuro: No new neurological complaints at this time. Non focal  Endocrinology: Negative.   Musculoskeletal: As per HPI.      EXAM:  /84 (BP Location: Left arm, Patient Position: Sitting, Cuff Size: Adult Large)   Pulse (!) 49   Wt 112.5 kg (248 lb)   SpO2 97%   BMI 34.35 kg/m     General: appears comfortable, alert and oriented  Head: normocephalic, atraumatic  Eyes: anicteric sclera, EOMI , PERRL  Neck: no adenopathy  Orophyarynx: moist mucosa, no lesions noted  Heart: Irregularly irregular,  Estimated JVP at 5 cmH2O  Lungs: CTAB, No wheezing.   Abdomen: soft, non-tender, bowel sounds present, no hepatosplenomegaly  Extremities: No LE edema today  Skin: no open lesions noted  Neuro: grossly non-focal  Psych: no evidence of depression noted     Labs:  Lab Results   Component Value Date    WBC 4.5 12/11/2018    HGB 12.4 (L) 12/11/2018    HCT 36.4 (L) 12/11/2018     12/11/2018     01/29/2019    POTASSIUM 4.2 01/29/2019    CHLORIDE 106 01/29/2019    CO2 26 01/29/2019    BUN 16 01/29/2019    CR 1.38 (H) 01/29/2019    GLC 90 01/29/2019    AST 38 08/26/2009    ALT 21 09/28/2011    INR 1.0  06/04/2015     Echocardiogram reviewed 12/2018:  Global and regional left ventricular function is normal with an EF of 55-60%.  The right ventricle is normal size. Global right ventricular function is  Normal. Mild aortic valve sclerosis is present. The proximal Asc Aorta measures 4.2cm in diameter. The aortic root index is 1.8cm/m2 (Normal index for males is 1.4-1.7cm/m2) No pericardial effusion is present.     Ziopatch 3/2019:   62% a-fib burden, mostly rate controlled.      ASSESSMENT AND PLAN:  In summary, patient is a 78 year old history of hypertension hyperlipidemia and paroxysmal atrial fibrillation on apixaban who presented to the cardiology clinic to follow up on recent zio monitoring results. Echocardiogram showed normal ejection fraction and he does not have any symptoms pertinent to heart failure.  His ChadsVasc score is 3 given his age and hypertension this will put him at 3.2% annual risk of stroke with atrial fibrillation.  He has been doing very well from the atrial fibrillation with no new complaints.  At this time we will continue all his medication including metoprolol as well as the apixaban. I agree with apixaban 5 mg twice daily dosing however we have to monitor his renal function intermittently as apixaban dose should be reduced if 2 of the following criteria met 1) age above 80 years old, 2) weight less than 60 kg which is not, 3) creatinine about 1.5.  So if his creatinine reaches 1.5 or higher I would decrease his apixaban dose to 2.5 mg twice daily dosing.  He is getting treatment for his hypothyroidism If his TSH has been declining appropriately.     Follow up:   6-month     I appreciate the opportunity to participate in the care of Sami Harmon . Please do not hesitate to contact me with any further questions.     Sincerely,   Benji Cornelius MD     [facility] Division of Cardiology

## 2019-06-28 NOTE — PROGRESS NOTES
June 28, 2019     Faustino Resendiz is an 77 yo gentleman with minimal past medical history including HTN, HL, who was recently seen by primary care provider and then by myself for atrial fibrillation. He was checking his blood pressure at home and he did notice that his blood pressure cuff shows irregular heart rate.  This prompted him asking his primary care provider about the regular heart rate and disease how his atrial fibrillation was discovered again he is completely symptomatic. He then came to cardiology clinic, was in sinus rhythm. Was started on low dose metoprolol and Ziopatch has been placed. Today he returns to follow up  No new complaints he has been doing very well.  No side effects from the apixaban.  No dizziness lightheadedness no syncope.  No other complaints.    PAST MEDICAL HISTORY:  Past Medical History:   Diagnosis Date     Acquired hypothyroidism 12/31/2018     Arthritis 2010     Essential hypertension, benign 2004     Macular degeneration 1980s     Paroxysmal atrial fibrillation (H) 12/31/2018     Pure hypercholesterolemia 2004    goal ldl <100     FAMILY HISTORY:  Family History   Problem Relation Age of Onset     Arthritis Mother      Eye Disorder Mother      Diabetes Mother         type 2     Hypertension Mother      Cancer - colorectal Mother      Cerebrovascular Disease Mother      Osteoporosis Mother      Obesity Mother      Colon Cancer Mother      Heart Disease Father      Prostate Cancer Father      Cerebrovascular Disease Father      Heart Disease Brother      Prostate Cancer Brother      Coronary Artery Disease Brother      Gastrointestinal Disease Son      SOCIAL HISTORY:  Social History     Socioeconomic History     Marital status:      Spouse name: Meredith     Number of children: 4     Years of education: None     Highest education level: None   Occupational History     Occupation:      Employer: RETIRED   Social Needs     Financial resource strain: None     Food  insecurity:     Worry: None     Inability: None     Transportation needs:     Medical: None     Non-medical: None   Tobacco Use     Smoking status: Never Smoker     Smokeless tobacco: Former User     Types: Chew     Tobacco comment: Chewed tobacco for 30 years   Substance and Sexual Activity     Alcohol use: Yes     Comment: 1 or less drink per day     Drug use: No     Sexual activity: Never     Partners: Female   Lifestyle     Physical activity:     Days per week: None     Minutes per session: None     Stress: None   Relationships     Social connections:     Talks on phone: None     Gets together: None     Attends Temple service: None     Active member of club or organization: None     Attends meetings of clubs or organizations: None     Relationship status: None     Intimate partner violence:     Fear of current or ex partner: None     Emotionally abused: None     Physically abused: None     Forced sexual activity: None   Other Topics Concern     Parent/sibling w/ CABG, MI or angioplasty before 65F 55M? No      Service No     Blood Transfusions No     Caffeine Concern No     Occupational Exposure No     Hobby Hazards No     Sleep Concern No     Stress Concern No     Weight Concern No     Special Diet No     Back Care No     Exercise No     Bike Helmet No     Seat Belt Yes     Self-Exams Yes   Social History Narrative     None     CURRENT MEDICATIONS:  Current Outpatient Medications   Medication     apixaban ANTICOAGULANT (ELIQUIS) 5 MG tablet     Ferrous Sulfate (IRON SUPPLEMENT PO)     latanoprost (XALATAN) 0.005 % ophthalmic solution     levothyroxine (SYNTHROID/LEVOTHROID) 200 MCG tablet     losartan (COZAAR) 100 MG tablet     lovastatin (MEVACOR) 40 MG tablet     LUTEIN 6 MG CAPS     metoprolol tartrate (LOPRESSOR) 50 MG tablet     MULTI-VITAMIN OR     OMEGA 3 1000 MG OR CAPS     Psyllium (METAMUCIL PO)     vitamin B-12 (CYANOCOBALAMIN) 500 MCG tablet     No current facility-administered medications  for this visit.      ROS:   Constitutional: No fever, chills, or sweats. Weight is 248 lbs 0 oz  ENT: No visual disturbance, ear ache, epistaxis, sore throat.   Allergies/Immunologic: Negative.   Respiratory: No cough, hemoptysis.   Cardiovascular: As per HPI.   GI: No nausea, vomiting, hematemesis, melena, or hematochezia.   : No urinary frequency, dysuria, or hematuria.   Integrument: Negative.   Psychiatric: No evidence of major depression  Neuro: No new neurological complaints at this time. Non focal  Endocrinology: Negative.   Musculoskeletal: As per HPI.      EXAM:  /84 (BP Location: Left arm, Patient Position: Sitting, Cuff Size: Adult Large)   Pulse (!) 49   Wt 112.5 kg (248 lb)   SpO2 97%   BMI 34.35 kg/m    General: appears comfortable, alert and oriented  Head: normocephalic, atraumatic  Eyes: anicteric sclera, EOMI , PERRL  Neck: no adenopathy  Orophyarynx: moist mucosa, no lesions noted  Heart: Irregularly irregular,  Estimated JVP at 5 cmH2O  Lungs: CTAB, No wheezing.   Abdomen: soft, non-tender, bowel sounds present, no hepatosplenomegaly  Extremities: No LE edema today  Skin: no open lesions noted  Neuro: grossly non-focal  Psych: no evidence of depression noted     Labs:  Lab Results   Component Value Date    WBC 4.5 12/11/2018    HGB 12.4 (L) 12/11/2018    HCT 36.4 (L) 12/11/2018     12/11/2018     01/29/2019    POTASSIUM 4.2 01/29/2019    CHLORIDE 106 01/29/2019    CO2 26 01/29/2019    BUN 16 01/29/2019    CR 1.38 (H) 01/29/2019    GLC 90 01/29/2019    AST 38 08/26/2009    ALT 21 09/28/2011    INR 1.0 06/04/2015     Echocardiogram reviewed 12/2018:  Global and regional left ventricular function is normal with an EF of 55-60%.  The right ventricle is normal size. Global right ventricular function is  Normal. Mild aortic valve sclerosis is present. The proximal Asc Aorta measures 4.2cm in diameter. The aortic root index is 1.8cm/m2 (Normal index for males is 1.4-1.7cm/m2) No  pericardial effusion is present.     Ziopatch 3/2019:   62% a-fib burden, mostly rate controlled.      ASSESSMENT AND PLAN:  In summary, patient is a 78 year old history of hypertension hyperlipidemia and paroxysmal atrial fibrillation on apixaban who presented to the cardiology clinic to follow up on recent zio monitoring results. Echocardiogram showed normal ejection fraction and he does not have any symptoms pertinent to heart failure.  His ChadsVasc score is 3 given his age and hypertension this will put him at 3.2% annual risk of stroke with atrial fibrillation.  He has been doing very well from the atrial fibrillation with no new complaints.  At this time we will continue all his medication including metoprolol as well as the apixaban. I agree with apixaban 5 mg twice daily dosing however we have to monitor his renal function intermittently as apixaban dose should be reduced if 2 of the following criteria met 1) age above 80 years old, 2) weight less than 60 kg which is not, 3) creatinine about 1.5.  So if his creatinine reaches 1.5 or higher I would decrease his apixaban dose to 2.5 mg twice daily dosing.  He is getting treatment for his hypothyroidism If his TSH has been declining appropriately.     Follow up:   6-month     I appreciate the opportunity to participate in the care of Sami Harmon . Please do not hesitate to contact me with any further questions.     Sincerely,   Benji Cornelius MD     [facility] Division of Cardiology

## 2019-07-01 LAB
CHOLEST SERPL-MCNC: 141 MG/DL
HDLC SERPL-MCNC: 56 MG/DL
LDLC SERPL CALC-MCNC: 73 MG/DL
NONHDLC SERPL-MCNC: 85 MG/DL
PSA SERPL-ACNC: 0.96 UG/L (ref 0–4)
TRIGL SERPL-MCNC: 62 MG/DL

## 2019-07-01 NOTE — TELEPHONE ENCOUNTER
I spoke with lab, and they were able add the PSA and Lipid Panel to previous test/sample. MyChart sent to patient informing him of this and need for repeat TSH end of July. Will leave open until read by patient, then may close encounter if no response.    Sarahi Peralta RN

## 2019-07-01 NOTE — TELEPHONE ENCOUNTER
"Routing to PCP to advise.  Patient had TSH recently with result WNL. It appears you had actually wanted him to get this checked in August. Note from 6/6/19 pasted below. He is wanting to have PSA and cholesterol checked at this time, and per  these were due in June. I have pended these tests. Is there anything else you would like tested, and does he need a visit with you?  Last visit was 12/31/18 for routine exam, then TSH labs checked several times since.     MyChart sent.  Sarahi Peralta RN    \"Called and reviewed  We will increase dose of thyroid medication      Initially sent to [facility] pharmacy but patient states he needs to change pharmacy to [facility]     I will forward this to [facility] pharmacy so they are aware of cancelling order to them     Recheck TSH in 2 months\"  "

## 2019-07-01 NOTE — TELEPHONE ENCOUNTER
Orders signed. See if they can be added to the previous blood draw. Otherwise he can get them done when he gets his TSH done. Present TSH indicates he may need an increase of the levothyroxine but I would like to see the result in August. If he could come in the last week of July I would be able to evaluate it before I go on sabbatical.

## 2019-07-30 DIAGNOSIS — E03.9 HYPOTHYROIDISM, UNSPECIFIED TYPE: ICD-10-CM

## 2019-07-30 LAB
T4 FREE SERPL-MCNC: 1.12 NG/DL (ref 0.76–1.46)
TSH SERPL DL<=0.005 MIU/L-ACNC: 7.73 MU/L (ref 0.4–4)

## 2019-07-30 PROCEDURE — 36415 COLL VENOUS BLD VENIPUNCTURE: CPT | Performed by: FAMILY MEDICINE

## 2019-07-30 PROCEDURE — 84439 ASSAY OF FREE THYROXINE: CPT | Performed by: FAMILY MEDICINE

## 2019-07-30 PROCEDURE — 84443 ASSAY THYROID STIM HORMONE: CPT | Performed by: FAMILY MEDICINE

## 2019-08-27 ENCOUNTER — MYC REFILL (OUTPATIENT)
Dept: CARDIOLOGY | Facility: CLINIC | Age: 79
End: 2019-08-27

## 2019-08-27 DIAGNOSIS — I48.0 PAROXYSMAL ATRIAL FIBRILLATION (H): ICD-10-CM

## 2019-08-27 RX ORDER — METOPROLOL TARTRATE 50 MG
TABLET ORAL
Qty: 180 TABLET | Refills: 1 | Status: SHIPPED | OUTPATIENT
Start: 2019-08-27 | End: 2019-09-19

## 2019-08-27 NOTE — TELEPHONE ENCOUNTER
Signed Prescriptions:                        Disp   Refills    metoprolol tartrate (LOPRESSOR) 50 MG tabl*180 ta*1        Sig: Take 1.5 tablets (75 mg) twice daily  Authorizing Provider: ARELY PAIGE  Ordering User: BRIGID WEBBER    Rx filled per refill protocol.  Brigid Webber RN

## 2019-09-19 DIAGNOSIS — I48.0 PAROXYSMAL ATRIAL FIBRILLATION (H): ICD-10-CM

## 2019-09-19 RX ORDER — METOPROLOL TARTRATE 50 MG
TABLET ORAL
Qty: 270 TABLET | Refills: 1 | Status: SHIPPED | OUTPATIENT
Start: 2019-09-19 | End: 2019-11-29

## 2019-09-19 NOTE — TELEPHONE ENCOUNTER
Pending Prescriptions:                       Disp   Refills    metoprolol tartrate (LOPRESSOR) 50 MG tab*180 ta*1            Sig: Take 1.5 tablets (75 mg) twice daily    Last Visit 6/28/19 olivia/Adryan  Last Filled 8/27/19  Quantity 180  Req. Received 9/19/19  AVEL Finn

## 2019-10-08 ENCOUNTER — IMMUNIZATION (OUTPATIENT)
Dept: NURSING | Facility: CLINIC | Age: 79
End: 2019-10-08
Payer: MEDICARE

## 2019-10-08 DIAGNOSIS — Z23 NEED FOR PROPHYLACTIC VACCINATION AND INOCULATION AGAINST INFLUENZA: Primary | ICD-10-CM

## 2019-10-08 PROCEDURE — 90662 IIV NO PRSV INCREASED AG IM: CPT

## 2019-10-08 PROCEDURE — 99207 ZZC NO CHARGE NURSE ONLY: CPT

## 2019-10-08 PROCEDURE — G0008 ADMIN INFLUENZA VIRUS VAC: HCPCS

## 2019-11-06 ENCOUNTER — MYC REFILL (OUTPATIENT)
Dept: FAMILY MEDICINE | Facility: CLINIC | Age: 79
End: 2019-11-06

## 2019-11-06 DIAGNOSIS — I48.91 ATRIAL FIBRILLATION (H): ICD-10-CM

## 2019-11-06 DIAGNOSIS — I48.0 PAROXYSMAL ATRIAL FIBRILLATION (H): Primary | ICD-10-CM

## 2019-11-06 NOTE — TELEPHONE ENCOUNTER
Requested Prescriptions   Pending Prescriptions Disp Refills     apixaban ANTICOAGULANT (ELIQUIS ANTICOAGULANT) 5 MG tablet  Last Written Prescription Date:  12/12/2018  Last Fill Quantity: 60 tablet,  # refills: 11   Last office visit: 12/31/2018 with prescribing provider:  FLORIDA Albarran   Future Office Visit:   Next 5 appointments (look out 90 days)    Mehran 10, 2020  9:30 AM CST  Return Visit with Benji Cornelius MD  [facility] ([facility]) [address] [place] [address] [phone]        60 tablet 11     Sig: Take 1 tablet (5 mg) by mouth 2 times daily       Direct Oral Anticoagulant Agents Failed - 11/6/2019 11:45 AM        Failed - Patient is 18-79 years of age        Failed - Recent (6 mo) or future (30 days) visit within the authorizing provider's specialty        Passed - Normal Platelets on file in past 12 months     Recent Labs   Lab Test 12/11/18  0955                  Passed - Medication is active on med list        Passed - Serum creatinine less than or equal to 1.4 on file in past 12 mos     Recent Labs   Lab Test 01/29/19  0742   CR 1.38*             Passed - Weight is greater than 60 kg for the past year     Wt Readings from Last 3 Encounters:   06/28/19 112.5 kg (248 lb)   03/22/19 113.4 kg (250 lb)   12/31/18 110.9 kg (244 lb 8 oz)

## 2019-11-07 NOTE — TELEPHONE ENCOUNTER
Routing refill request to provider for review/approval because:  Has not been seen since 12/31/18, Age    Laine Mitchell, RN, BSN, PHN  [facility]: [place]

## 2019-11-09 ENCOUNTER — HEALTH MAINTENANCE LETTER (OUTPATIENT)
Age: 79
End: 2019-11-09

## 2019-11-25 ENCOUNTER — MYC MEDICAL ADVICE (OUTPATIENT)
Dept: CARDIOLOGY | Facility: CLINIC | Age: 79
End: 2019-11-25

## 2019-11-29 DIAGNOSIS — E78.2 MIXED HYPERLIPIDEMIA: Primary | ICD-10-CM

## 2019-11-29 DIAGNOSIS — I48.0 PAROXYSMAL ATRIAL FIBRILLATION (H): ICD-10-CM

## 2019-11-29 DIAGNOSIS — I10 BENIGN ESSENTIAL HYPERTENSION: ICD-10-CM

## 2019-11-29 RX ORDER — METOPROLOL SUCCINATE 100 MG/1
150 TABLET, EXTENDED RELEASE ORAL DAILY
Qty: 135 TABLET | Refills: 1 | Status: SHIPPED | OUTPATIENT
Start: 2019-11-29 | End: 2020-07-23

## 2019-11-29 NOTE — PROGRESS NOTES
Pt presently on metoprolol tartrate 75 mg bid, due to insurance coverage pt requests to change to toprol xl       Discussed with dr cornelius,     Date: 11/29/2019    Time of Call: 11:54 AM     Diagnosis:       [ TORB ] Ordering provider: Dr Cornelius    Order: discontinue lopressor 75 mg bid  Start toprol xl 150 mg once daily b     Order received by:Mona Real RN      Follow-up/additional notes:

## 2019-12-02 ENCOUNTER — DOCUMENTATION ONLY (OUTPATIENT)
Dept: LAB | Facility: CLINIC | Age: 79
End: 2019-12-02

## 2019-12-02 ENCOUNTER — MYC MEDICAL ADVICE (OUTPATIENT)
Dept: FAMILY MEDICINE | Facility: CLINIC | Age: 79
End: 2019-12-02

## 2019-12-02 DIAGNOSIS — I48.0 PAROXYSMAL ATRIAL FIBRILLATION (H): ICD-10-CM

## 2019-12-02 DIAGNOSIS — I10 HYPERTENSION GOAL BP (BLOOD PRESSURE) < 140/90: ICD-10-CM

## 2019-12-02 DIAGNOSIS — E03.9 HYPOTHYROIDISM, UNSPECIFIED TYPE: Primary | ICD-10-CM

## 2019-12-02 NOTE — PROGRESS NOTES
Pt has lab appointment for upcoming physical.  Orders due in  have been pended for approval.  Per previous testing and advice, a TSHR has also been ordered.  Please place any additional orders or advise patient.      Thanks,  [facility]

## 2019-12-02 NOTE — TELEPHONE ENCOUNTER
See [employer] message reply to patient.  Will await response. Okay to close if read with no reply.    Mynor Gonzalez RN

## 2019-12-13 ENCOUNTER — MYC REFILL (OUTPATIENT)
Dept: FAMILY MEDICINE | Facility: CLINIC | Age: 79
End: 2019-12-13

## 2019-12-13 DIAGNOSIS — E78.5 HYPERLIPIDEMIA LDL GOAL <100: ICD-10-CM

## 2019-12-13 DIAGNOSIS — N18.30 CKD (CHRONIC KIDNEY DISEASE) STAGE 3, GFR 30-59 ML/MIN (H): ICD-10-CM

## 2019-12-13 DIAGNOSIS — I10 HYPERTENSION GOAL BP (BLOOD PRESSURE) < 140/90: ICD-10-CM

## 2019-12-13 RX ORDER — LOSARTAN POTASSIUM 100 MG/1
100 TABLET ORAL DAILY
Qty: 30 TABLET | Refills: 0 | Status: SHIPPED | OUTPATIENT
Start: 2019-12-13 | End: 2019-12-14

## 2019-12-13 RX ORDER — LOVASTATIN 40 MG
40 TABLET ORAL AT BEDTIME
Qty: 90 TABLET | Refills: 3 | Status: CANCELLED | OUTPATIENT
Start: 2019-12-13

## 2019-12-13 RX ORDER — LOVASTATIN 40 MG
TABLET ORAL
Qty: 30 TABLET | Refills: 0 | Status: SHIPPED | OUTPATIENT
Start: 2019-12-13 | End: 2019-12-14

## 2019-12-13 RX ORDER — LOSARTAN POTASSIUM 100 MG/1
100 TABLET ORAL DAILY
Qty: 90 TABLET | Refills: 3 | Status: CANCELLED | OUTPATIENT
Start: 2019-12-13

## 2019-12-13 NOTE — TELEPHONE ENCOUNTER
Duplicate, these requests were addressed, lovastatin refilled for a month, patient due for annual visit end of this month.    Losartan was routed to provider due to elevated creatinine.    MyChart response routed to patient advising him of the above.    Annette Weller RN  [facility]

## 2019-12-13 NOTE — TELEPHONE ENCOUNTER
Lovastatin: Medication is being filled for 1 time refill only due to:  Patient needs to be seen because it has been more than one year since last visit.  Due at end of this month.    Losartan:  Routing refill request to provider for review/approval because:  Labs out of range:  Creatinine    Annette Weller, RN  [facility]

## 2019-12-13 NOTE — TELEPHONE ENCOUNTER
"Requested Prescriptions   Pending Prescriptions Disp Refills     lovastatin (MEVACOR) 40 MG tablet [Pharmacy Med Name: LOVASTATIN 40 MG TABLET] 90 tablet 1     Sig: TAKE 1 TABLET BY MOUTH AT BEDTIME     12/31/18  Last Written Prescription Date:  12/31/18  Last Fill Quantity: 90,  # refills: 3   Last office visit: 12/31/2018 with prescribing provider:  12/31/18   Future Office Visit:   Next 5 appointments (look out 90 days)    Dec 24, 2019  8:15 AM CST  Lab visit with [facility]  [facility]) [address] [place] [id number]  [phone]   Mehran 10, 2020  9:30 AM CST  Return Visit with Benji Cornelius MD  [facility] ([facility]) [address] [place] [address] [address]nd [address]  [facility] [address]  [phone]             Statins Protocol Passed - 12/13/2019  9:15 AM        Passed - LDL on file in past 12 months     Recent Labs   Lab Test 06/27/19  0742   LDL 73             Passed - No abnormal creatine kinase in past 12 months     No lab results found.             Passed - Recent (12 mo) or future (30 days) visit within the authorizing provider's specialty     Patient has had an office visit with the authorizing provider or a provider within the authorizing providers department within the previous 12 mos or has a future within next 30 days. See \"Patient Info\" tab in inbasket, or \"Choose Columns\" in Meds & Orders section of the refill encounter.              Passed - Medication is active on med list        Passed - Patient is age 18 or older        losartan (COZAAR) 100 MG tablet [Pharmacy Med Name: LOSARTAN POTASSIUM 100 MG TAB] 90 tablet 1     Sig: TAKE 1 TABLET BY MOUTH ONCE DAILY       Last Written Prescription Date:  12/31/18  Last Fill Quantity: 90,  # refills: 3   Last office visit: 12/31/2018 with prescribing provider:  12/31/18   Future Office Visit:   Next 5 appointments (look out 90 days)    Dec 24, 2019  " "8:15 AM CST  Lab visit with [facility]  [facility] ([facility]) [address] [facility] [address] [phone]   Mehran 10, 2020  9:30 AM CST  Return Visit with Benji Cornelius MD  [facility] AT [facility] ([facility]) [address]  [place] [id number] [phone]             Angiotensin-II Receptors Failed - 12/13/2019  9:15 AM        Failed - Normal serum creatinine on file in past 12 months     Recent Labs   Lab Test 01/29/19  0742   CR 1.38*             Passed - Last blood pressure under 140/90 in past 12 months     BP Readings from Last 3 Encounters:   06/28/19 125/84   03/22/19 117/77   12/31/18 122/76                 Passed - Recent (12 mo) or future (30 days) visit within the authorizing provider's specialty     Patient has had an office visit with the authorizing provider or a provider within the authorizing providers department within the previous 12 mos or has a future within next 30 days. See \"Patient Info\" tab in inbasket, or \"Choose Columns\" in Meds & Orders section of the refill encounter.              Passed - Medication is active on med list        Passed - Patient is age 18 or older        Passed - Normal serum potassium on file in past 12 months     Recent Labs   Lab Test 01/29/19  0742   POTASSIUM 4.2                      "

## 2019-12-14 RX ORDER — LOSARTAN POTASSIUM 100 MG/1
100 TABLET ORAL DAILY
Qty: 90 TABLET | Refills: 3 | Status: SHIPPED | OUTPATIENT
Start: 2019-12-14 | End: 2020-12-18

## 2019-12-14 RX ORDER — LOVASTATIN 40 MG
40 TABLET ORAL AT BEDTIME
Qty: 90 TABLET | Refills: 3 | Status: SHIPPED | OUTPATIENT
Start: 2019-12-14 | End: 2020-12-18

## 2019-12-24 DIAGNOSIS — I10 HYPERTENSION GOAL BP (BLOOD PRESSURE) < 140/90: ICD-10-CM

## 2019-12-24 DIAGNOSIS — E03.9 HYPOTHYROIDISM, UNSPECIFIED TYPE: ICD-10-CM

## 2019-12-24 DIAGNOSIS — I48.0 PAROXYSMAL ATRIAL FIBRILLATION (H): ICD-10-CM

## 2019-12-24 LAB
ERYTHROCYTE [DISTWIDTH] IN BLOOD BY AUTOMATED COUNT: 13.2 % (ref 10–15)
HCT VFR BLD AUTO: 38.8 % (ref 40–53)
HGB BLD-MCNC: 12.8 G/DL (ref 13.3–17.7)
MCH RBC QN AUTO: 33.3 PG (ref 26.5–33)
MCHC RBC AUTO-ENTMCNC: 33 G/DL (ref 31.5–36.5)
MCV RBC AUTO: 101 FL (ref 78–100)
PLATELET # BLD AUTO: 149 10E9/L (ref 150–450)
RBC # BLD AUTO: 3.84 10E12/L (ref 4.4–5.9)
WBC # BLD AUTO: 5.2 10E9/L (ref 4–11)

## 2019-12-24 PROCEDURE — 83735 ASSAY OF MAGNESIUM: CPT | Performed by: FAMILY MEDICINE

## 2019-12-24 PROCEDURE — 80053 COMPREHEN METABOLIC PANEL: CPT | Performed by: FAMILY MEDICINE

## 2019-12-24 PROCEDURE — 84439 ASSAY OF FREE THYROXINE: CPT | Performed by: FAMILY MEDICINE

## 2019-12-24 PROCEDURE — 85027 COMPLETE CBC AUTOMATED: CPT | Performed by: FAMILY MEDICINE

## 2019-12-24 PROCEDURE — 84443 ASSAY THYROID STIM HORMONE: CPT | Performed by: FAMILY MEDICINE

## 2019-12-24 PROCEDURE — 36415 COLL VENOUS BLD VENIPUNCTURE: CPT | Performed by: FAMILY MEDICINE

## 2019-12-26 LAB
ALBUMIN SERPL-MCNC: 3.5 G/DL (ref 3.4–5)
ALP SERPL-CCNC: 61 U/L (ref 40–150)
ALT SERPL W P-5'-P-CCNC: 36 U/L (ref 0–70)
ANION GAP SERPL CALCULATED.3IONS-SCNC: 4 MMOL/L (ref 3–14)
AST SERPL W P-5'-P-CCNC: 24 U/L (ref 0–45)
BILIRUB SERPL-MCNC: 0.8 MG/DL (ref 0.2–1.3)
BUN SERPL-MCNC: 17 MG/DL (ref 7–30)
CALCIUM SERPL-MCNC: 8.8 MG/DL (ref 8.5–10.1)
CHLORIDE SERPL-SCNC: 109 MMOL/L (ref 94–109)
CO2 SERPL-SCNC: 26 MMOL/L (ref 20–32)
CREAT SERPL-MCNC: 1.36 MG/DL (ref 0.66–1.25)
GFR SERPL CREATININE-BSD FRML MDRD: 48 ML/MIN/{1.73_M2}
GLUCOSE SERPL-MCNC: 92 MG/DL (ref 70–99)
MAGNESIUM SERPL-MCNC: 2.1 MG/DL (ref 1.6–2.3)
POTASSIUM SERPL-SCNC: 4.3 MMOL/L (ref 3.4–5.3)
PROT SERPL-MCNC: 7 G/DL (ref 6.8–8.8)
SODIUM SERPL-SCNC: 139 MMOL/L (ref 133–144)
T4 FREE SERPL-MCNC: 1.2 NG/DL (ref 0.76–1.46)
TSH SERPL DL<=0.005 MIU/L-ACNC: 6.04 MU/L (ref 0.4–4)

## 2019-12-26 ASSESSMENT — ENCOUNTER SYMPTOMS
PALPITATIONS: 0
JOINT SWELLING: 0
NERVOUS/ANXIOUS: 0
MYALGIAS: 0
DIARRHEA: 0
HEARTBURN: 0
PARESTHESIAS: 0
FEVER: 0
HEADACHES: 0
EYE PAIN: 0
HEMATOCHEZIA: 0
NAUSEA: 0
HEMATURIA: 0
CONSTIPATION: 0
WEAKNESS: 0
DYSURIA: 0
CHILLS: 0
SHORTNESS OF BREATH: 0
DIZZINESS: 0
SORE THROAT: 0
ARTHRALGIAS: 0
COUGH: 1
FREQUENCY: 0
ABDOMINAL PAIN: 0

## 2019-12-26 ASSESSMENT — ACTIVITIES OF DAILY LIVING (ADL): CURRENT_FUNCTION: NO ASSISTANCE NEEDED

## 2019-12-31 ENCOUNTER — OFFICE VISIT (OUTPATIENT)
Dept: FAMILY MEDICINE | Facility: CLINIC | Age: 79
End: 2019-12-31
Payer: MEDICARE

## 2019-12-31 VITALS
OXYGEN SATURATION: 96 % | WEIGHT: 248.8 LBS | BODY MASS INDEX: 34.83 KG/M2 | HEART RATE: 60 BPM | DIASTOLIC BLOOD PRESSURE: 70 MMHG | TEMPERATURE: 97.5 F | SYSTOLIC BLOOD PRESSURE: 122 MMHG | HEIGHT: 71 IN

## 2019-12-31 DIAGNOSIS — I48.0 PAROXYSMAL ATRIAL FIBRILLATION (H): ICD-10-CM

## 2019-12-31 DIAGNOSIS — D75.89 MACROCYTOSIS WITHOUT ANEMIA: ICD-10-CM

## 2019-12-31 DIAGNOSIS — N18.30 CKD (CHRONIC KIDNEY DISEASE) STAGE 3, GFR 30-59 ML/MIN (H): ICD-10-CM

## 2019-12-31 DIAGNOSIS — I10 HYPERTENSION GOAL BP (BLOOD PRESSURE) < 140/90: ICD-10-CM

## 2019-12-31 DIAGNOSIS — Z00.00 ENCOUNTER FOR MEDICARE ANNUAL WELLNESS EXAM: Primary | ICD-10-CM

## 2019-12-31 LAB
BASOPHILS # BLD AUTO: 0 10E9/L (ref 0–0.2)
BASOPHILS NFR BLD AUTO: 0.2 %
DIFFERENTIAL METHOD BLD: ABNORMAL
EOSINOPHIL # BLD AUTO: 0.1 10E9/L (ref 0–0.7)
EOSINOPHIL NFR BLD AUTO: 1.9 %
ERYTHROCYTE [DISTWIDTH] IN BLOOD BY AUTOMATED COUNT: 13.3 % (ref 10–15)
FOLATE SERPL-MCNC: 42.9 NG/ML
HCT VFR BLD AUTO: 40.1 % (ref 40–53)
HGB BLD-MCNC: 13.3 G/DL (ref 13.3–17.7)
LYMPHOCYTES # BLD AUTO: 2.3 10E9/L (ref 0.8–5.3)
LYMPHOCYTES NFR BLD AUTO: 38.4 %
MCH RBC QN AUTO: 33.5 PG (ref 26.5–33)
MCHC RBC AUTO-ENTMCNC: 33.2 G/DL (ref 31.5–36.5)
MCV RBC AUTO: 101 FL (ref 78–100)
MONOCYTES # BLD AUTO: 0.7 10E9/L (ref 0–1.3)
MONOCYTES NFR BLD AUTO: 12.6 %
NEUTROPHILS # BLD AUTO: 2.8 10E9/L (ref 1.6–8.3)
NEUTROPHILS NFR BLD AUTO: 46.9 %
PLATELET # BLD AUTO: 152 10E9/L (ref 150–450)
RBC # BLD AUTO: 3.97 10E12/L (ref 4.4–5.9)
RETICS # AUTO: 73.1 10E9/L (ref 25–95)
RETICS/RBC NFR AUTO: 1.9 % (ref 0.5–2)
VIT B12 SERPL-MCNC: 718 PG/ML (ref 193–986)
WBC # BLD AUTO: 5.9 10E9/L (ref 4–11)

## 2019-12-31 PROCEDURE — 85045 AUTOMATED RETICULOCYTE COUNT: CPT | Performed by: FAMILY MEDICINE

## 2019-12-31 PROCEDURE — 85025 COMPLETE CBC W/AUTO DIFF WBC: CPT | Performed by: FAMILY MEDICINE

## 2019-12-31 PROCEDURE — G0439 PPPS, SUBSEQ VISIT: HCPCS | Performed by: FAMILY MEDICINE

## 2019-12-31 PROCEDURE — 82043 UR ALBUMIN QUANTITATIVE: CPT | Performed by: FAMILY MEDICINE

## 2019-12-31 PROCEDURE — 85060 BLOOD SMEAR INTERPRETATION: CPT | Performed by: FAMILY MEDICINE

## 2019-12-31 PROCEDURE — 82746 ASSAY OF FOLIC ACID SERUM: CPT | Performed by: FAMILY MEDICINE

## 2019-12-31 PROCEDURE — 82607 VITAMIN B-12: CPT | Performed by: FAMILY MEDICINE

## 2019-12-31 PROCEDURE — 36415 COLL VENOUS BLD VENIPUNCTURE: CPT | Performed by: FAMILY MEDICINE

## 2019-12-31 ASSESSMENT — ENCOUNTER SYMPTOMS
WEAKNESS: 0
DYSURIA: 0
NAUSEA: 0
EYE PAIN: 0
HEARTBURN: 0
SORE THROAT: 0
COUGH: 1
ABDOMINAL PAIN: 0
JOINT SWELLING: 0
NERVOUS/ANXIOUS: 0
DIARRHEA: 0
FEVER: 0
HEMATURIA: 0
CONSTIPATION: 0
CHILLS: 0
SHORTNESS OF BREATH: 0
HEMATOCHEZIA: 0
PARESTHESIAS: 0
ARTHRALGIAS: 0
HEADACHES: 0
MYALGIAS: 0
DIZZINESS: 0
FREQUENCY: 0
PALPITATIONS: 0

## 2019-12-31 ASSESSMENT — MIFFLIN-ST. JEOR: SCORE: 1855.68

## 2019-12-31 ASSESSMENT — ACTIVITIES OF DAILY LIVING (ADL): CURRENT_FUNCTION: NO ASSISTANCE NEEDED

## 2019-12-31 NOTE — PROGRESS NOTES
"SUBJECTIVE:   Sami Harmon is a 79 year old male who presents for Preventive Visit.    Are you in the first 12 months of your Medicare coverage?  No    Healthy Habits:     In general, how would you rate your overall health?  Good    Frequency of exercise:  4-5 days/week    Duration of exercise:  30-45 minutes    Do you usually eat at least 4 servings of fruit and vegetables a day, include whole grains    & fiber and avoid regularly eating high fat or \"junk\" foods?  Yes    Taking medications regularly:  Yes    Ability to successfully perform activities of daily living:  No assistance needed    Home Safety:  No safety concerns identified    Hearing Impairment:  Difficulty following a conversation in a noisy restaurant or crowded room, find that men's voices are easier to understand than woman's and difficulty understanding soft or whispered speech    In the past 6 months, have you been bothered by leaking of urine?  No    In general, how would you rate your overall mental or emotional health?  Excellent      PHQ-2 Total Score: 0    Additional concerns today:  Yes    Do you feel safe in your environment? Yes    Have you ever done Advance Care Planning? (For example, a Health Directive, POLST, or a discussion with a medical provider or your loved ones about your wishes): Yes, advance care planning is on file.        Fall risk  Fallen 2 or more times in the past year?: No  Any fall with injury in the past year?: No    Cognitive Screening   1) Repeat 3 items (Leader, Season, Table)    2) Clock draw: NORMAL  3) 3 item recall: Recalls 3 objects  Results: 3 items recalled: COGNITIVE IMPAIRMENT LESS LIKELY    Mini-CogTM Copyright ISA Paniagua. Licensed by the author for use in [facility]; reprinted with permission (janeth@.[facility]). All rights reserved.      Do you have sleep apnea, excessive snoring or daytime drowsiness?: no      Reviewed and updated as needed this visit by clinical staff  Tobacco  Allergies  Meds "  Med Hx  Surg Hx  Fam Hx  Soc Hx        Reviewed and updated as needed this visit by Provider        Social History     Tobacco Use     Smoking status: Never Smoker     Smokeless tobacco: Former User     Types: Chew     Tobacco comment: Chewed tobacco for 30 years   Substance Use Topics     Alcohol use: Yes     Comment: 1 or less drink per day     If you drink alcohol do you typically have >3 drinks per day or >7 drinks per week? No    No flowsheet data found.          Current providers sharing in care for this patient include:   Patient Care Team:  Clemente Albarran MD as PCP - General (Family Practice)  Benji Cornelius MD as Assigned PCP  Mona Real, RN as Specialty Care Coordinator (Cardiology)    The following health maintenance items are reviewed in Epic and correct as of today:  Health Maintenance   Topic Date Due     ADVANCE CARE PLANNING  07/30/2019     MEDICARE ANNUAL WELLNESS VISIT  12/31/2019     FALL RISK ASSESSMENT  12/31/2019     MICROALBUMIN  01/29/2020     LIPID  06/27/2020     PSA  06/27/2020     BMP  12/24/2020     DTAP/TDAP/TD IMMUNIZATION (3 - Td) 10/10/2022     COLONOSCOPY  11/08/2022     PHQ-2  Completed     INFLUENZA VACCINE  Completed     PNEUMOCOCCAL IMMUNIZATION 65+ LOW/MEDIUM RISK  Completed     ZOSTER IMMUNIZATION  Completed     IPV IMMUNIZATION  Aged Out     MENINGITIS IMMUNIZATION  Aged Out     BP Readings from Last 3 Encounters:   12/31/19 122/70   06/28/19 125/84   03/22/19 117/77    Wt Readings from Last 3 Encounters:   12/31/19 112.9 kg (248 lb 12.8 oz)   06/28/19 112.5 kg (248 lb)   03/22/19 113.4 kg (250 lb)                    Review of Systems   Constitutional: Negative for chills and fever.   HENT: Positive for hearing loss. Negative for congestion, ear pain and sore throat.    Eyes: Negative for pain and visual disturbance.   Respiratory: Positive for cough. Negative for shortness of breath.    Cardiovascular: Negative for chest pain, palpitations and peripheral  "edema.   Gastrointestinal: Negative for abdominal pain, constipation, diarrhea, heartburn, hematochezia and nausea.   Genitourinary: Positive for urgency. Negative for discharge, dysuria, frequency, genital sores, hematuria and impotence.   Musculoskeletal: Negative for arthralgias, joint swelling and myalgias.   Skin: Negative for rash.   Neurological: Negative for dizziness, weakness, headaches and paresthesias.   Psychiatric/Behavioral: Negative for mood changes. The patient is not nervous/anxious.      This document serves as a record of the services and decisions personally performed and made by Ayden Albarran MD. It was created on his behalf by Yasmany Garrett, a trained medical scribe. The creation of this document is based on the provider's statements to the medical scribe.  Yasmany Garrett 8:50 AM December 31, 2019    OBJECTIVE:   /70 (BP Location: Right arm, Patient Position: Sitting, Cuff Size: Adult Large)   Pulse 60   Temp 97.5  F (36.4  C) (Oral)   Ht 1.803 m (5' 11\")   Wt 112.9 kg (248 lb 12.8 oz)   SpO2 96%   BMI 34.70 kg/m   Estimated body mass index is 34.7 kg/m  as calculated from the following:    Height as of this encounter: 1.803 m (5' 11\").    Weight as of this encounter: 112.9 kg (248 lb 12.8 oz).  Physical Exam  GENERAL: overweight, alert and no distress  EYES: Eyes grossly normal to inspection, PERRL and conjunctivae and sclerae normal  HENT: ear canals and TM's normal, nose and mouth without ulcers or lesions  NECK: no adenopathy, no asymmetry, masses, or scars and thyroid normal to palpation  RESP: lungs clear to auscultation - no rales, rhonchi or wheezes  CV: regular rate and rhythm, normal S1 S2, no S3 or S4, no murmur, click or rub, no peripheral edema and peripheral pulses strong  ABDOMEN: soft, nontender, no hepatosplenomegaly, no masses and bowel sounds normal  MS: no gross musculoskeletal defects noted, no edema  SKIN: no suspicious lesions or rashes  NEURO: Normal strength " "and tone, mentation intact and speech normal  PSYCH: mentation appears normal, affect normal/bright        ASSESSMENT / PLAN:       ICD-10-CM    1. Encounter for Medicare annual wellness exam Z00.00    2. Paroxysmal atrial fibrillation (H) I48.0    3. Hypertension goal BP (blood pressure) < 140/90 I10    4. CKD (chronic kidney disease) stage 3, GFR 30-59 ml/min (H) N18.3 Albumin Random Urine Quantitative with Creat Ratio   5. Macrocytosis without anemia D75.89 Vitamin B12     Folate     Blood Morphology Pathologist Review     CBC with platelets differential     Reticulocyte Count   Will be obtaining test for evaluating his macrocytosis and mild thrombocytopenia, continue current medications.     COUNSELING:  Reviewed preventive health counseling, as reflected in patient instructions       Regular exercise       Healthy diet/nutrition       Colon cancer screening    Estimated body mass index is 34.7 kg/m  as calculated from the following:    Height as of this encounter: 1.803 m (5' 11\").    Weight as of this encounter: 112.9 kg (248 lb 12.8 oz).       reports that he has never smoked. He quit smokeless tobacco use about 19 years ago.  His smokeless tobacco use included chew.      Appropriate preventive services were discussed with this patient, including applicable screening as appropriate for cardiovascular disease, diabetes, osteopenia/osteoporosis, and glaucoma.  As appropriate for age/gender, discussed screening for colorectal cancer, prostate cancer, breast cancer, and cervical cancer. Checklist reviewing preventive services available has been given to the patient.    Reviewed patients plan of care and provided an AVS. The Basic Care Plan (routine screening as documented in Health Maintenance) for Sami meets the Care Plan requirement. This Care Plan has been established and reviewed with the Patient.    Counseling Resources:  ATP IV Guidelines  Pooled Cohorts Equation Calculator  Breast Cancer Risk " Calculator  FRAX Risk Assessment  ICSI Preventive Guidelines  Dietary Guidelines for Americans, 2010  USDA's MyPlate  ASA Prophylaxis  Lung CA Screening    The information in this document, created by the medical scribe for me, accurately reflects the services I personally performed and the decisions made by me. I have reviewed and approved this document for accuracy prior to leaving the patient care area.  December 31, 2019 9:18 AM    Clemente Albarran MD, MD  [facility]    Identified Health Risks:      The patient was provided with written information regarding signs of hearing loss.

## 2019-12-31 NOTE — PATIENT INSTRUCTIONS
Patient Education   Personalized Prevention Plan  You are due for the preventive services outlined below.  Your care team is available to assist you in scheduling these services.  If you have already completed any of these items, please share that information with your care team to update in your medical record.  Health Maintenance Due   Topic Date Due     Discuss Advance Care Planning  07/30/2019     Annual Wellness Visit  12/31/2019     FALL RISK ASSESSMENT  12/31/2019     Kidney Microalbumin Urine Test  01/29/2020     Preventive Health Recommendations  See your health care provider every year to    Review health changes.     Discuss preventive care.      Review your medicines if your doctor has prescribed any.    Talk with your health care provider about whether you should have a test to screen for prostate cancer (PSA).    Every 3 years, have a diabetes test (fasting glucose). If you are at risk for diabetes, you should have this test more often.    Every 5 years, have a cholesterol test. Have this test more often if you are at risk for high cholesterol or heart disease.     Every 10 years, have a colonoscopy. Or, have a yearly FIT test (stool test). These exams will check for colon cancer.    Talk to with your health care provider about screening for Abdominal Aortic Aneurysm if you have a family history of AAA or have a history of smoking.    Shots:     Get a flu shot each year.     Get a tetanus shot every 10 years.     Talk to your doctor about your pneumonia vaccines. There are now two you should receive - Pneumovax (PPSV 23) and Prevnar (PCV 13).    Talk to your pharmacist about a shingles vaccine.     Talk to your doctor about the hepatitis B vaccine.    Nutrition:     Eat at least 5 servings of fruits and vegetables each day.     Eat whole-grain bread, whole-wheat pasta and brown rice instead of white grains and rice.     Get adequate Calcium and Vitamin D.     Lifestyle    Exercise for at least 150  minutes a week (30 minutes a day, 5 days a week). This will help you control your weight and prevent disease.     Limit alcohol to one drink per day.     No smoking.     Wear sunscreen to prevent skin cancer.     See your dentist every six months for an exam and cleaning.     See your eye doctor every 1 to 2 years to screen for conditions such as glaucoma, macular degeneration and cataracts.    Personalized Prevention Plan  You are due for the preventive services outlined below.  Your care team is available to assist you in scheduling these services.  If you have already completed any of these items, please share that information with your care team to update in your medical record.  Health Maintenance Due   Topic Date Due     Discuss Advance Care Planning  07/30/2019     Annual Wellness Visit  12/31/2019     FALL RISK ASSESSMENT  12/31/2019     Kidney Microalbumin Urine Test  01/29/2020       Signs of Hearing Loss     Hearing much better with one ear can be a sign of hearing loss.     Hearing loss is a problem shared by many people. In fact, it is one of the most common health conditions, particularly as people age. Most people over age 65 have some hearing loss, and by age 80, almost everyone does. Because hearing loss usually occurs slowly over the years, you may not realize your hearing ability has gotten worse.  Have your hearing checked  Contact your healthcare provider if you:    Have to strain to hear normal conversation    Have to watch other people s faces very carefully to follow what they re saying    Need to ask people to repeat what they ve said    Often misunderstand what people are saying    Turn the volume of the television or radio up so high that others complain    Feel that people are mumbling when they re talking to you    Find that the effort to hear leaves you feeling tired and irritated    Notice, when using the phone, that you hear better with one ear than the other  Date Last Reviewed:  12/1/2016 2000-2018 [employer]. [address] [place] [address], [place] [address]. All rights reserved. This information is not intended as a substitute for professional medical care. Always follow your healthcare professional's instructions.      Orders Placed This Encounter     Albumin Random Urine Quantitative with Creat Ratio     This test includes a Creatinine Ratio.  Do not order [id number] (Creatinine Random Urine)  Last Lab Result: Albumin Urine mg/g Cr (mg/g Cr)       Date                     Value                 01/29/2019               9.26             ----------  Creatinine Urine (mg/dL)       Date                     Value                 01/29/2019               58               ----------     Order Specific Question:   Includes     Answer:   with Creat Ratio     Vitamin B12     Folate     Blood Morphology Pathologist Review     CBC with platelets differential and Reticulocyte count should be ordered concurrently with the peripheral smear (all tests performed on the same tube of blood). The concurrent CBC with platelets differential and Reticulocyte count are incorporated into the final peripheral smear report and are necessary for interpretation.     Order Specific Question:   Clinical indications for testing     Answer:   macrocytosis, thrombocytopenia     CBC with platelets differential     Last Lab Result: Hemoglobin (g/dL)       Date                     Value                 12/24/2019               12.8 (L)         ----------     Reticulocyte Count       Orders Placed This Encounter     OFFICE/OUTPT VISIT,EST,LEVL III     Albumin Random Urine Quantitative with Creat Ratio     This test includes a Creatinine Ratio.  Do not order [id number] (Creatinine Random Urine)  Last Lab Result: Albumin Urine mg/g Cr (mg/g Cr)       Date                     Value                 01/29/2019               9.26             ----------  Creatinine Urine (mg/dL)       Date                     Value                  01/29/2019               58               ----------     Order Specific Question:   Includes     Answer:   with Creat Ratio     Vitamin B12     Folate     Blood Morphology Pathologist Review     CBC with platelets differential and Reticulocyte count should be ordered concurrently with the peripheral smear (all tests performed on the same tube of blood). The concurrent CBC with platelets differential and Reticulocyte count are incorporated into the final peripheral smear report and are necessary for interpretation.     Order Specific Question:   Clinical indications for testing     Answer:   macrocytosis, thrombocytopenia     CBC with platelets differential     Last Lab Result: Hemoglobin (g/dL)       Date                     Value                 12/24/2019               12.8 (L)         ----------     Reticulocyte Count

## 2020-01-01 LAB
CREAT UR-MCNC: 168 MG/DL
MICROALBUMIN UR-MCNC: 6 MG/L
MICROALBUMIN/CREAT UR: 3.8 MG/G CR (ref 0–17)

## 2020-01-03 LAB — COPATH REPORT: NORMAL

## 2020-01-10 ENCOUNTER — OFFICE VISIT (OUTPATIENT)
Dept: CARDIOLOGY | Facility: CLINIC | Age: 80
End: 2020-01-10
Payer: MEDICARE

## 2020-01-10 VITALS
DIASTOLIC BLOOD PRESSURE: 84 MMHG | SYSTOLIC BLOOD PRESSURE: 137 MMHG | HEART RATE: 53 BPM | BODY MASS INDEX: 35.15 KG/M2 | WEIGHT: 252 LBS | OXYGEN SATURATION: 98 %

## 2020-01-10 DIAGNOSIS — N18.30 CKD (CHRONIC KIDNEY DISEASE) STAGE 3, GFR 30-59 ML/MIN (H): ICD-10-CM

## 2020-01-10 DIAGNOSIS — I10 BENIGN ESSENTIAL HYPERTENSION: Primary | ICD-10-CM

## 2020-01-10 DIAGNOSIS — I48.0 PAROXYSMAL ATRIAL FIBRILLATION (H): ICD-10-CM

## 2020-01-10 DIAGNOSIS — I50.30 HEART FAILURE WITH PRESERVED EJECTION FRACTION, NYHA CLASS I (H): ICD-10-CM

## 2020-01-10 PROCEDURE — 99214 OFFICE O/P EST MOD 30 MIN: CPT | Performed by: INTERNAL MEDICINE

## 2020-01-10 NOTE — LETTER
1/10/2020      RE: Sami Harmon  [address] [place] [id number]       Dear Colleague,    Thank you for the opportunity to participate in the care of your patient, Sami Harmon, at the [facility] AT [facility]. Please see a copy of my visit note below.    January 10, 2020     Faustino Resendiz is an 77 yo gentleman with minimal past medical history including HTN, HL, who was recently seen by primary care provider and then by myself for atrial fibrillation. He was checking his blood pressure at home and he did notice that his blood pressure cuff shows irregular heart rate.  This prompted him asking his primary care provider about the regular heart rate and disease how his atrial fibrillation was discovered again he is completely symptomatic. He then came to cardiology clinic, was in sinus rhythm. Was started on low dose metoprolol and Ziopatch has been placed as results as below. Today he returns to follow up  He has been feeling very well offers no new complaints no dizziness lightheadedness no palpitations.  The blood pressure cuff still open shows irregular heart rate and we discussed that this is normal given his atrial fibrillation.  There is no episodes of bleeding from any sources.  He denies any lower extremity edema no PND orthopnea.     PAST MEDICAL HISTORY:  Past Medical History:   Diagnosis Date     Acquired hypothyroidism 12/31/2018     Arthritis 2010     Essential hypertension, benign 2004     Macular degeneration 1980s     Paroxysmal atrial fibrillation (H) 12/31/2018     Pure hypercholesterolemia 2004    goal ldl <100     FAMILY HISTORY:  Family History   Problem Relation Age of Onset     Arthritis Mother      Eye Disorder Mother      Diabetes Mother         type 2     Hypertension Mother      Cancer - colorectal Mother      Cerebrovascular Disease Mother      Osteoporosis Mother      Obesity Mother       Colon Cancer Mother      Heart Disease Father      Prostate Cancer Father      Cerebrovascular Disease Father      Heart Disease Brother      Prostate Cancer Brother      Coronary Artery Disease Brother      Gastrointestinal Disease Son      Prostate Cancer Brother      SOCIAL HISTORY:  Social History     Socioeconomic History     Marital status:      Spouse name: Meredith     Number of children: 4     Years of education: None     Highest education level: None   Occupational History     Occupation:      Employer: RETIRED   Social Needs     Financial resource strain: None     Food insecurity:     Worry: None     Inability: None     Transportation needs:     Medical: None     Non-medical: None   Tobacco Use     Smoking status: Never Smoker     Smokeless tobacco: Former User     Types: Chew     Tobacco comment: Chewed tobacco for 30 years   Substance and Sexual Activity     Alcohol use: Yes     Comment: 1 or less drink per day     Drug use: No     Sexual activity: Never     Partners: Female   Lifestyle     Physical activity:     Days per week: None     Minutes per session: None     Stress: None   Relationships     Social connections:     Talks on phone: None     Gets together: None     Attends Congregation service: None     Active member of club or organization: None     Attends meetings of clubs or organizations: None     Relationship status: None     Intimate partner violence:     Fear of current or ex partner: None     Emotionally abused: None     Physically abused: None     Forced sexual activity: None   Other Topics Concern     Parent/sibling w/ CABG, MI or angioplasty before 65F 55M? No      Service No     Blood Transfusions No     Caffeine Concern No     Occupational Exposure No     Hobby Hazards No     Sleep Concern No     Stress Concern No     Weight Concern No     Special Diet No     Back Care No     Exercise No     Bike Helmet No     Seat Belt Yes     Self-Exams Yes   Social History  Narrative     None     CURRENT MEDICATIONS:  Current Outpatient Medications   Medication     apixaban ANTICOAGULANT (ELIQUIS ANTICOAGULANT) 5 MG tablet     Ferrous Sulfate (IRON SUPPLEMENT PO)     latanoprost (XALATAN) 0.005 % ophthalmic solution     levothyroxine (SYNTHROID/LEVOTHROID) 200 MCG tablet     losartan (COZAAR) 100 MG tablet     lovastatin (MEVACOR) 40 MG tablet     metoprolol succinate ER (TOPROL XL) 100 MG 24 hr tablet     MULTI-VITAMIN OR     OMEGA 3 1000 MG OR CAPS     Psyllium (METAMUCIL PO)     No current facility-administered medications for this visit.      ROS:   Constitutional: No fever, chills, or sweats. Weight is 0 lbs 0 oz  ENT: No visual disturbance, ear ache, epistaxis, sore throat.   Allergies/Immunologic: Negative.   Respiratory: No cough, hemoptysis.   Cardiovascular: As per HPI.   GI: No nausea, vomiting, hematemesis, melena, or hematochezia.   : No urinary frequency, dysuria, or hematuria.   Integrument: Negative.   Psychiatric: No evidence of major depression  Neuro: No new neurological complaints at this time. Non focal  Endocrinology: Negative.   Musculoskeletal: As per HPI.      EXAM:  /84 (BP Location: Left arm, Patient Position: Chair, Cuff Size: Adult Large)   Pulse 53   Wt 114.3 kg (252 lb)   SpO2 98%   BMI 35.15 kg/m     General: appears comfortable, alert and oriented  Head: normocephalic, atraumatic  Eyes: anicteric sclera, EOMI , PERRL  Neck: no adenopathy  Orophyarynx: moist mucosa, no lesions noted  Heart: Irregularly irregular, heart rate in the 60s, estimated JVP at 5 cmH2O  Lungs: CTAB, No wheezing.   Abdomen: soft, non-tender, bowel sounds present, no hepatosplenomegaly  Extremities: No LE edema today  Skin: no open lesions noted  Neuro: grossly non-focal  Psych: no evidence of depression noted     Labs:  Lab Results   Component Value Date    WBC 5.9 12/31/2019    HGB 13.3 12/31/2019    HCT 40.1 12/31/2019     12/31/2019     12/24/2019     POTASSIUM 4.3 12/24/2019    CHLORIDE 109 12/24/2019    CO2 26 12/24/2019    BUN 17 12/24/2019    CR 1.36 (H) 12/24/2019    GLC 92 12/24/2019    AST 24 12/24/2019    ALT 36 12/24/2019    ALKPHOS 61 12/24/2019    BILITOTAL 0.8 12/24/2019    INR 1.0 06/04/2015     Echocardiogram reviewed 12/2018:  Global and regional left ventricular function is normal with an EF of 55-60%.  The right ventricle is normal size. Global right ventricular function is  Normal. Mild aortic valve sclerosis is present. The proximal Asc Aorta measures 4.2cm in diameter. The aortic root index is 1.8cm/m2 (Normal index for males is 1.4-1.7cm/m2) No pericardial effusion is present.     Ziopatch 3/2019:   62% a-fib burden, mostly rate controlled.      ASSESSMENT AND PLAN:  In summary, patient is a 78 year old history of hypertension hyperlipidemia and paroxysmal atrial fibrillation on apixaban who presented to the cardiology clinic to follow up.  He remains in atrial fibrillation today with very well controlled ventricular response.  He is completely asymptomatic and has no side effects from anticoagulation.  His ChadsVasc score is 3 given his age and hypertension this will put him at 3.2% annual risk of stroke with atrial fibrillation. We have to monitor his renal function intermittently ongoing as apixaban dose should be reduced from 5 mg twice daily if  2 of the following criteria met 1) age above 80 years old, 2) weight less than 60 kg which is not, 3) creatinine about 1.5.  So if his creatinine reaches 1.5 or higher I would decrease his apixaban dose to 2.5 mg twice daily dosing.  He is getting treatment for his hypothyroidism.     Follow up:   1 year with labs.    I appreciate the opportunity to participate in the care of Sami Harmon . Please do not hesitate to contact me with any further questions.     Sincerely,   Benji Cornelius MD     [facility] Division of Cardiology

## 2020-01-10 NOTE — NURSING NOTE
"Chief Complaint   Patient presents with     RECHECK     6 month follow up. Pt reports no cardiac symptoms.       Initial /84 (BP Location: Left arm, Patient Position: Chair, Cuff Size: Adult Large)   Pulse 53   Wt 114.3 kg (252 lb)   SpO2 98%   BMI 35.15 kg/m   Estimated body mass index is 35.15 kg/m  as calculated from the following:    Height as of 12/31/19: 1.803 m (5' 11\").    Weight as of this encounter: 114.3 kg (252 lb)..  BP completed using cuff size: miguel ángel Field MA  "

## 2020-01-10 NOTE — PROGRESS NOTES
January 10, 2020     Faustino Resendiz is an 79 yo gentleman with minimal past medical history including HTN, HL, who was recently seen by primary care provider and then by myself for atrial fibrillation. He was checking his blood pressure at home and he did notice that his blood pressure cuff shows irregular heart rate.  This prompted him asking his primary care provider about the regular heart rate and disease how his atrial fibrillation was discovered again he is completely symptomatic. He then came to cardiology clinic, was in sinus rhythm. Was started on low dose metoprolol and Ziopatch has been placed as results as below. Today he returns to follow up  He has been feeling very well offers no new complaints no dizziness lightheadedness no palpitations.  The blood pressure cuff still open shows irregular heart rate and we discussed that this is normal given his atrial fibrillation.  There is no episodes of bleeding from any sources.  He denies any lower extremity edema no PND orthopnea.     PAST MEDICAL HISTORY:  Past Medical History:   Diagnosis Date     Acquired hypothyroidism 12/31/2018     Arthritis 2010     Essential hypertension, benign 2004     Macular degeneration 1980s     Paroxysmal atrial fibrillation (H) 12/31/2018     Pure hypercholesterolemia 2004    goal ldl <100     FAMILY HISTORY:  Family History   Problem Relation Age of Onset     Arthritis Mother      Eye Disorder Mother      Diabetes Mother         type 2     Hypertension Mother      Cancer - colorectal Mother      Cerebrovascular Disease Mother      Osteoporosis Mother      Obesity Mother      Colon Cancer Mother      Heart Disease Father      Prostate Cancer Father      Cerebrovascular Disease Father      Heart Disease Brother      Prostate Cancer Brother      Coronary Artery Disease Brother      Gastrointestinal Disease Son      Prostate Cancer Brother      SOCIAL HISTORY:  Social History     Socioeconomic History     Marital status:       Spouse name: Meredith     Number of children: 4     Years of education: None     Highest education level: None   Occupational History     Occupation:      Employer: RETIRED   Social Needs     Financial resource strain: None     Food insecurity:     Worry: None     Inability: None     Transportation needs:     Medical: None     Non-medical: None   Tobacco Use     Smoking status: Never Smoker     Smokeless tobacco: Former User     Types: Chew     Tobacco comment: Chewed tobacco for 30 years   Substance and Sexual Activity     Alcohol use: Yes     Comment: 1 or less drink per day     Drug use: No     Sexual activity: Never     Partners: Female   Lifestyle     Physical activity:     Days per week: None     Minutes per session: None     Stress: None   Relationships     Social connections:     Talks on phone: None     Gets together: None     Attends Synagogue service: None     Active member of club or organization: None     Attends meetings of clubs or organizations: None     Relationship status: None     Intimate partner violence:     Fear of current or ex partner: None     Emotionally abused: None     Physically abused: None     Forced sexual activity: None   Other Topics Concern     Parent/sibling w/ CABG, MI or angioplasty before 65F 55M? No      Service No     Blood Transfusions No     Caffeine Concern No     Occupational Exposure No     Hobby Hazards No     Sleep Concern No     Stress Concern No     Weight Concern No     Special Diet No     Back Care No     Exercise No     Bike Helmet No     Seat Belt Yes     Self-Exams Yes   Social History Narrative     None     CURRENT MEDICATIONS:  Current Outpatient Medications   Medication     apixaban ANTICOAGULANT (ELIQUIS ANTICOAGULANT) 5 MG tablet     Ferrous Sulfate (IRON SUPPLEMENT PO)     latanoprost (XALATAN) 0.005 % ophthalmic solution     levothyroxine (SYNTHROID/LEVOTHROID) 200 MCG tablet     losartan (COZAAR) 100 MG tablet     lovastatin (MEVACOR)  40 MG tablet     metoprolol succinate ER (TOPROL XL) 100 MG 24 hr tablet     MULTI-VITAMIN OR     OMEGA 3 1000 MG OR CAPS     Psyllium (METAMUCIL PO)     No current facility-administered medications for this visit.      ROS:   Constitutional: No fever, chills, or sweats. Weight is 0 lbs 0 oz  ENT: No visual disturbance, ear ache, epistaxis, sore throat.   Allergies/Immunologic: Negative.   Respiratory: No cough, hemoptysis.   Cardiovascular: As per HPI.   GI: No nausea, vomiting, hematemesis, melena, or hematochezia.   : No urinary frequency, dysuria, or hematuria.   Integrument: Negative.   Psychiatric: No evidence of major depression  Neuro: No new neurological complaints at this time. Non focal  Endocrinology: Negative.   Musculoskeletal: As per HPI.      EXAM:  /84 (BP Location: Left arm, Patient Position: Chair, Cuff Size: Adult Large)   Pulse 53   Wt 114.3 kg (252 lb)   SpO2 98%   BMI 35.15 kg/m    General: appears comfortable, alert and oriented  Head: normocephalic, atraumatic  Eyes: anicteric sclera, EOMI , PERRL  Neck: no adenopathy  Orophyarynx: moist mucosa, no lesions noted  Heart: Irregularly irregular, heart rate in the 60s, estimated JVP at 5 cmH2O  Lungs: CTAB, No wheezing.   Abdomen: soft, non-tender, bowel sounds present, no hepatosplenomegaly  Extremities: No LE edema today  Skin: no open lesions noted  Neuro: grossly non-focal  Psych: no evidence of depression noted     Labs:  Lab Results   Component Value Date    WBC 5.9 12/31/2019    HGB 13.3 12/31/2019    HCT 40.1 12/31/2019     12/31/2019     12/24/2019    POTASSIUM 4.3 12/24/2019    CHLORIDE 109 12/24/2019    CO2 26 12/24/2019    BUN 17 12/24/2019    CR 1.36 (H) 12/24/2019    GLC 92 12/24/2019    AST 24 12/24/2019    ALT 36 12/24/2019    ALKPHOS 61 12/24/2019    BILITOTAL 0.8 12/24/2019    INR 1.0 06/04/2015     Echocardiogram reviewed 12/2018:  Global and regional left ventricular function is normal with an EF of  55-60%.  The right ventricle is normal size. Global right ventricular function is  Normal. Mild aortic valve sclerosis is present. The proximal Asc Aorta measures 4.2cm in diameter. The aortic root index is 1.8cm/m2 (Normal index for males is 1.4-1.7cm/m2) No pericardial effusion is present.     Ziopatch 3/2019:   62% a-fib burden, mostly rate controlled.      ASSESSMENT AND PLAN:  In summary, patient is a 78 year old history of hypertension hyperlipidemia and paroxysmal atrial fibrillation on apixaban who presented to the cardiology clinic to follow up.  He remains in atrial fibrillation today with very well controlled ventricular response.  He is completely asymptomatic and has no side effects from anticoagulation.  His ChadsVasc score is 3 given his age and hypertension this will put him at 3.2% annual risk of stroke with atrial fibrillation. We have to monitor his renal function intermittently ongoing as apixaban dose should be reduced from 5 mg twice daily if  2 of the following criteria met 1) age above 80 years old, 2) weight less than 60 kg which is not, 3) creatinine about 1.5.  So if his creatinine reaches 1.5 or higher I would decrease his apixaban dose to 2.5 mg twice daily dosing.  He is getting treatment for his hypothyroidism.     Follow up:   1 year with labs.    I appreciate the opportunity to participate in the care of Sami MORGAN Harmon . Please do not hesitate to contact me with any further questions.     Sincerely,   Benji Cornelius MD     [facility] Division of Cardiology

## 2020-02-19 ENCOUNTER — ANCILLARY PROCEDURE (OUTPATIENT)
Dept: GENERAL RADIOLOGY | Facility: CLINIC | Age: 80
End: 2020-02-19
Attending: NURSE PRACTITIONER
Payer: MEDICARE

## 2020-02-19 ENCOUNTER — OFFICE VISIT (OUTPATIENT)
Dept: FAMILY MEDICINE | Facility: CLINIC | Age: 80
End: 2020-02-19
Payer: MEDICARE

## 2020-02-19 VITALS
BODY MASS INDEX: 35.42 KG/M2 | SYSTOLIC BLOOD PRESSURE: 118 MMHG | DIASTOLIC BLOOD PRESSURE: 76 MMHG | HEIGHT: 71 IN | OXYGEN SATURATION: 97 % | WEIGHT: 253 LBS | HEART RATE: 80 BPM | TEMPERATURE: 97.8 F

## 2020-02-19 DIAGNOSIS — R06.89 BREATH SOUNDS, ABNORMAL: ICD-10-CM

## 2020-02-19 DIAGNOSIS — R05.9 COUGH: Primary | ICD-10-CM

## 2020-02-19 DIAGNOSIS — R05.9 COUGH: ICD-10-CM

## 2020-02-19 LAB
BASOPHILS # BLD AUTO: 0 10E9/L (ref 0–0.2)
BASOPHILS NFR BLD AUTO: 0.1 %
DIFFERENTIAL METHOD BLD: ABNORMAL
EOSINOPHIL # BLD AUTO: 0.1 10E9/L (ref 0–0.7)
EOSINOPHIL NFR BLD AUTO: 1.9 %
ERYTHROCYTE [DISTWIDTH] IN BLOOD BY AUTOMATED COUNT: 13.2 % (ref 10–15)
HCT VFR BLD AUTO: 39.7 % (ref 40–53)
HGB BLD-MCNC: 12.8 G/DL (ref 13.3–17.7)
LYMPHOCYTES # BLD AUTO: 2 10E9/L (ref 0.8–5.3)
LYMPHOCYTES NFR BLD AUTO: 29.9 %
MCH RBC QN AUTO: 33.2 PG (ref 26.5–33)
MCHC RBC AUTO-ENTMCNC: 32.2 G/DL (ref 31.5–36.5)
MCV RBC AUTO: 103 FL (ref 78–100)
MONOCYTES # BLD AUTO: 1.3 10E9/L (ref 0–1.3)
MONOCYTES NFR BLD AUTO: 19.4 %
NEUTROPHILS # BLD AUTO: 3.3 10E9/L (ref 1.6–8.3)
NEUTROPHILS NFR BLD AUTO: 48.7 %
PLATELET # BLD AUTO: 136 10E9/L (ref 150–450)
RBC # BLD AUTO: 3.86 10E12/L (ref 4.4–5.9)
WBC # BLD AUTO: 6.8 10E9/L (ref 4–11)

## 2020-02-19 PROCEDURE — 99214 OFFICE O/P EST MOD 30 MIN: CPT | Performed by: NURSE PRACTITIONER

## 2020-02-19 PROCEDURE — 85025 COMPLETE CBC W/AUTO DIFF WBC: CPT | Performed by: NURSE PRACTITIONER

## 2020-02-19 PROCEDURE — 71046 X-RAY EXAM CHEST 2 VIEWS: CPT | Mod: FY

## 2020-02-19 PROCEDURE — 36415 COLL VENOUS BLD VENIPUNCTURE: CPT | Performed by: NURSE PRACTITIONER

## 2020-02-19 RX ORDER — CODEINE PHOSPHATE AND GUAIFENESIN 10; 100 MG/5ML; MG/5ML
1-2 SOLUTION ORAL EVERY 4 HOURS PRN
Qty: 118 ML | Refills: 0 | Status: SHIPPED | OUTPATIENT
Start: 2020-02-19 | End: 2020-12-18

## 2020-02-19 ASSESSMENT — MIFFLIN-ST. JEOR: SCORE: 1874.73

## 2020-02-19 NOTE — PROGRESS NOTES
"Subjective     Sami Harmon is a 79 year old male who presents to clinic today for the following health issues:    HPI   ENT Symptoms             Symptoms: cc Present Absent Comment   Fever/Chills   x    Fatigue   x    Muscle Aches   x    Eye Irritation   x    Sneezing   x    Nasal Justin/Drg   x    Sinus Pressure/Pain   x    Loss of smell   x    Dental pain   x    Sore Throat   x    Swollen Glands   x    Ear Pain/Fullness   x    Cough x      Wheeze  x     Chest Pain   x    Shortness of breath  x     Rash   x    Other   x      Symptom duration:  4 days   Symptom severity:  worse in the mornings    Treatments tried:  OTC meds   Contacts:  none      He has had 4 days of a productive cough and perhaps a little shortness of breath when he has a coughing fit. Sometimes he will have wheezing until he coughs and gets things cleared. Otherwise, no SOB, chest pain, palpitation, or edema. No fever, runny nose, or sinus pressure. He denies Hx of pneumonia, asthma, COPD. The cough is most bothersome in the evening and then first thing in the morning when he wakes up. It is productive, greenish mucous first thing in the AM, that progresses to clear by later morning. He has been taking Coricidin and cough lozenges.  Patient decided to come in for evaluation because \"at my age, you worry about pneumonia with a cough like this.\"     Of note, he has a history of A. Fib, HTN, and aortic valve stenosis      Patient Active Problem List   Diagnosis     Family history of prostate cancer     Hyperlipidemia LDL goal <100     CKD (chronic kidney disease) stage 3, GFR 30-59 ml/min (H)     Advance care planning     Primary localized osteoarthrosis, pelvic region and thigh     History of colon polyps     Advanced directives, counseling/discussion     Hypertension goal BP (blood pressure) < 140/90     Overweight     Paroxysmal atrial fibrillation (H)     Acquired hypothyroidism     Past Surgical History:   Procedure Laterality Date     ABDOMEN " SURGERY  1970's, 1980's    Two hernia surgerys     BIOPSY  July 2016    polyp removed from vocal cord - benign     COLONOSCOPY       COSMETIC SURGERY       EYE SURGERY  2000    Macular scar, presently being treated with avastin     HERNIA REPAIR, INGUINAL RT/LT  1970,1980     ORTHOPEDIC SURGERY  April 2015, June 2015    Right hip replacement, Left hip replacement     SURGICAL HISTORY OF -   2000    cholesteatoma removal L     SURGICAL HISTORY OF -   3/2004    laser macular     SURGICAL HISTORY OF -   4-2015    right hip replacement     TONSILLECTOMY & ADENOIDECTOMY  1961       Social History     Tobacco Use     Smoking status: Never Smoker     Smokeless tobacco: Former User     Types: Chew     Tobacco comment: Chewed tobacco for 30 years   Substance Use Topics     Alcohol use: Yes     Comment: 1 or less drink per day     Family History   Problem Relation Age of Onset     Arthritis Mother      Eye Disorder Mother      Diabetes Mother         type 2     Hypertension Mother      Cancer - colorectal Mother      Cerebrovascular Disease Mother      Osteoporosis Mother      Obesity Mother      Colon Cancer Mother      Heart Disease Father      Prostate Cancer Father      Cerebrovascular Disease Father      Heart Disease Brother      Prostate Cancer Brother      Coronary Artery Disease Brother      Gastrointestinal Disease Son      Prostate Cancer Brother          BP Readings from Last 3 Encounters:   02/19/20 118/76   01/10/20 137/84   12/31/19 122/70    Wt Readings from Last 3 Encounters:   02/19/20 114.8 kg (253 lb)   01/10/20 114.3 kg (252 lb)   12/31/19 112.9 kg (248 lb 12.8 oz)           Reviewed and updated as needed this visit by Provider       Review of Systems   ROS COMP: CONSTITUTIONAL: NEGATIVE for fever, chills, change in weight  INTEGUMENTARY/SKIN: NEGATIVE for worrisome rashes, moles or lesions  EYES: NEGATIVE for vision changes or irritation  ENT/MOUTH: NEGATIVE for ear, mouth and throat  "problems  RESP:POSITIVE for cough-productive and NEGATIVE for Hx asthma, Hx COPD, Hx pneumonia and SOB/dyspnea  CV: POSITIVE for HX HTN and irregular heart beat and NEGATIVE for chest pain/chest pressure, diaphoresis and dyspnea on exertion  GI: NEGATIVE for nausea, abdominal pain, heartburn, or change in bowel habits  : NEGATIVE for frequency, dysuria, or hematuria  MUSCULOSKELETAL: NEGATIVE for significant arthralgias or myalgia  NEURO: NEGATIVE for weakness, dizziness or paresthesias  ENDOCRINE: NEGATIVE for temperature intolerance, skin/hair changes  HEME: NEGATIVE for bleeding problems  PSYCHIATRIC: NEGATIVE for changes in mood or affect      Objective    /76   Pulse 80   Temp 97.8  F (36.6  C) (Oral)   Ht 1.803 m (5' 11\")   Wt 114.8 kg (253 lb)   SpO2 97%   BMI 35.29 kg/m    Body mass index is 35.29 kg/m .  Physical Exam   GENERAL: healthy, alert and no distress  EYES: Eyes grossly normal to inspection, PERRL and conjunctivae and sclerae normal  HENT: normal cephalic/atraumatic, right ear: normal: no effusions, no erythema, normal landmarks, left ear: normal: no effusions, no erythema, normal landmarks and ear canal with enlarged opening per patient due to surgery, nose and mouth without ulcers or lesions, oropharynx clear and oral mucous membranes moist  NECK: no adenopathy, no asymmetry, masses, or scars and thyroid normal to palpation  RESP: R lung clear to auscultation - no rales, rhonchi or wheezes. L lung- rhonchi L upper anterior, L upper posterior and L lower posterior and expiratory wheezes L upper posterior and L lower posterior  CV: irregularly irregular rhythm and no peripheral edema  MS: no gross musculoskeletal defects noted, no edema  SKIN: no suspicious lesions or rashes  NEURO: Normal strength and tone, mentation intact and speech normal  PSYCH: mentation appears normal, affect normal/bright    Diagnostic Test Results:  Results for orders placed or performed in visit on 02/19/20 " (from the past 24 hour(s))   CBC with platelets and differential   Result Value Ref Range    WBC 6.8 4.0 - 11.0 10e9/L    RBC Count 3.86 (L) 4.4 - 5.9 10e12/L    Hemoglobin 12.8 (L) 13.3 - 17.7 g/dL    Hematocrit 39.7 (L) 40.0 - 53.0 %     (H) 78 - 100 fl    MCH 33.2 (H) 26.5 - 33.0 pg    MCHC 32.2 31.5 - 36.5 g/dL    RDW 13.2 10.0 - 15.0 %    Platelet Count 136 (L) 150 - 450 10e9/L    % Neutrophils 48.7 %    % Lymphocytes 29.9 %    % Monocytes 19.4 %    % Eosinophils 1.9 %    % Basophils 0.1 %    Absolute Neutrophil 3.3 1.6 - 8.3 10e9/L    Absolute Lymphocytes 2.0 0.8 - 5.3 10e9/L    Absolute Monocytes 1.3 0.0 - 1.3 10e9/L    Absolute Eosinophils 0.1 0.0 - 0.7 10e9/L    Absolute Basophils 0.0 0.0 - 0.2 10e9/L    Diff Method Automated Method      CXR - reviewed with [phone] Dr. Michaud at [facility]. Per his read, aortic atherosclerosis, otherwise unremarkable.   IMPRESSION:  There are no acute infiltrates. The cardiac silhouette is not enlarged. Pulmonary vasculature is unremarkable. Aorticatherosclerosis noted.        Assessment & Plan     1. Cough  Chest XR to evaluate for pneumonia. Per radiology read, essentially unremarkable. CBC ordered to rule out infection, WBC normal. Of note, patient is anemic, hgb 12.8, patient baseline. Discussed CXR and labs results with patient.  Although physical exam was concerning for pneumonia, CXR and labs are reassuring. Guaifenesin-codeine ordered today for cough. Discussed with patient that if he is not starting to feel better by Friday or feels worse/develops a fever, he should call back to clinic and should be treated based on clinical picture in the absence of a negative x-ray.  Signs and symptoms that would warrant further evaluation or visit to ER were reviewed. Patient voiced understanding and is in agreement with this plan.   - XR Chest 2 Views; Future  - CBC with platelets and differential  - guaiFENesin-codeine 100-10 MG/5ML PO solution; Take 5-10  "mLs by mouth every 4 hours as needed for cough  Dispense: 118 mL; Refill: 0    2. Breath sounds, abnormal  See plan as above.   - XR Chest 2 Views; Future  - CBC with platelets and differential     BMI:   Estimated body mass index is 35.29 kg/m  as calculated from the following:    Height as of this encounter: 1.803 m (5' 11\").    Weight as of this encounter: 114.8 kg (253 lb).   Weight management plan: Patient was referred to their PCP to discuss a diet and exercise plan.        See Patient Instructions    Return in about 3 months (around 5/19/2020), or if symptoms worsen or fail to improve.    MILO PachecoN, RN, CCTC  DNP-FNP student, [facility]    [facility]    "

## 2020-02-19 NOTE — PATIENT INSTRUCTIONS
Your chest xray was normal     Your white count was normal     Codeine cough syrup to help with cough    If your symptoms worsen please call the clinic by Friday and we will order antibiotics.

## 2020-02-20 ENCOUNTER — TELEPHONE (OUTPATIENT)
Dept: FAMILY MEDICINE | Facility: CLINIC | Age: 80
End: 2020-02-20

## 2020-02-20 DIAGNOSIS — J18.9 COMMUNITY ACQUIRED PNEUMONIA, UNSPECIFIED LATERALITY: Primary | ICD-10-CM

## 2020-02-20 NOTE — TELEPHONE ENCOUNTER
Sonam Dumas, OUSMANE CNP  P Ne Team YELENA Jeronimo in case this pt calls this week please see my note      Copied from CC chart.    Cecilia Mercedes

## 2020-02-21 ENCOUNTER — TELEPHONE (OUTPATIENT)
Dept: FAMILY MEDICINE | Facility: CLINIC | Age: 80
End: 2020-02-21

## 2020-02-21 NOTE — TELEPHONE ENCOUNTER
"Routing to providers in Sonam Dumas's absence per her request. Please see notes below. Tammy saw patient on 2/19/20 for cough, instructed patient should be prescribed antibiotics if he wasn't feeling better by today.  Her notes, etc are pasted in other notes below.    Patient called today in another encounter-pasted below. Patient reports continued cough with mucus, mostly clear.  The guaifenesin-codeine did help to loosen some things up, but still feels like his chest is congested, and cough raspy, not feeling a whole lot better.  He had a temperature of 97.8 F this morning. Denies SOB or respiratory distress. He prefers [facility].      Sarahi Peralta RN      \"Reason for call:  Patient reporting a symptom     Symptom or request: cough      Duration (how long have symptoms been present): on going      Have you been treated for this before? Yes     Additional comments: patient was seen on 2/19/20 symptoms have not improved\"   "

## 2020-02-21 NOTE — TELEPHONE ENCOUNTER
Patient notified and was instructed to schedule f/u appt in a week if symptoms are persisting.  Understanding voiced.    Sarahi Peralta RN

## 2020-02-21 NOTE — TELEPHONE ENCOUNTER
"RN had huddled with Sonam Dumas CNP re: patient, and if he calls with continued/worsening symptoms, she would like forwarded to another provider for antibiotic Rx. Her note from 2/19/20 visit is pasted below.    Sarahi Peralta RN    \"Discussed with patient that if he is not starting to feel better by Friday or feels worse/develops a fever, he should call back to clinic and should be treated based on clinical picture in the absence of a negative x-ray.\"  "

## 2020-02-21 NOTE — TELEPHONE ENCOUNTER
There is already another encounter open re: this patient and situation, so closing this one.    Sarahi Peralta RN

## 2020-02-21 NOTE — TELEPHONE ENCOUNTER
Reason for call:  Patient reporting a symptom    Symptom or request: cough     Duration (how long have symptoms been present): on going     Have you been treated for this before? Yes    Additional comments: patient was seen on 2/19/20 symptoms have not improved     Phone Number patient can be reached at:  Home number on file [phone] (home)    Best Time:  any    Can we leave a detailed message on this number:  YES    Call taken on 2/21/2020 at 8:21 AM by Shonna Schafer

## 2020-03-12 ENCOUNTER — TRANSFERRED RECORDS (OUTPATIENT)
Dept: HEALTH INFORMATION MANAGEMENT | Facility: CLINIC | Age: 80
End: 2020-03-12

## 2020-05-27 DIAGNOSIS — E03.9 ACQUIRED HYPOTHYROIDISM: ICD-10-CM

## 2020-05-28 ENCOUNTER — MYC REFILL (OUTPATIENT)
Dept: FAMILY MEDICINE | Facility: CLINIC | Age: 80
End: 2020-05-28

## 2020-05-28 DIAGNOSIS — E03.9 ACQUIRED HYPOTHYROIDISM: ICD-10-CM

## 2020-05-28 RX ORDER — LEVOTHYROXINE SODIUM 200 UG/1
200 TABLET ORAL DAILY
Qty: 90 TABLET | Refills: 3 | Status: SHIPPED | OUTPATIENT
Start: 2020-05-28 | End: 2021-01-12

## 2020-05-28 RX ORDER — LEVOTHYROXINE SODIUM 200 UG/1
200 TABLET ORAL DAILY
Qty: 90 TABLET | Refills: 3 | Status: CANCELLED | OUTPATIENT
Start: 2020-05-28

## 2020-05-28 NOTE — TELEPHONE ENCOUNTER
"Requested Prescriptions   Pending Prescriptions Disp Refills     levothyroxine (SYNTHROID/LEVOTHROID) 200 MCG tablet [Pharmacy Med Name: LEVOTHYROXINE 200 MCG TABLET] 90 tablet 3     Sig: TAKE 1 TABLET (200 MCG) BY MOUTH DAILY       Thyroid Protocol Failed - 5/27/2020 12:07 AM        Failed - Normal TSH on file in past 12 months     Recent Labs   Lab Test 12/24/19  0816   TSH 6.04*              Passed - Patient is 12 years or older        Passed - Recent (12 mo) or future (30 days) visit within the authorizing provider's specialty     Patient has had an office visit with the authorizing provider or a provider within the authorizing providers department within the previous 12 mos or has a future within next 30 days. See \"Patient Info\" tab in inbasket, or \"Choose Columns\" in Meds & Orders section of the refill encounter.              Passed - Medication is active on med list           Routing refill request to provider for review/approval because:  Labs out of range:  TSH      Kaci Dutton RN    "

## 2020-05-28 NOTE — TELEPHONE ENCOUNTER
Routing to RN. Duplicate encounter. Please un pend order and close encounter.    Cecilia Mercedes

## 2020-06-16 ENCOUNTER — MYC MEDICAL ADVICE (OUTPATIENT)
Dept: FAMILY MEDICINE | Facility: CLINIC | Age: 80
End: 2020-06-16

## 2020-06-16 DIAGNOSIS — N18.30 CKD (CHRONIC KIDNEY DISEASE) STAGE 3, GFR 30-59 ML/MIN (H): ICD-10-CM

## 2020-06-16 DIAGNOSIS — D75.89 MACROCYTOSIS WITHOUT ANEMIA: ICD-10-CM

## 2020-06-16 DIAGNOSIS — E61.1 IRON DEFICIENCY: ICD-10-CM

## 2020-06-16 DIAGNOSIS — E03.9 HYPOTHYROIDISM, UNSPECIFIED TYPE: ICD-10-CM

## 2020-06-16 DIAGNOSIS — I48.0 PAROXYSMAL ATRIAL FIBRILLATION (H): Primary | ICD-10-CM

## 2020-06-16 NOTE — TELEPHONE ENCOUNTER
Routing MyChart message to PCP to please advise.        No notes available to see which labs patient would be needing.    Please order labs, had quite a few last 12/2019    Venice Howard RN

## 2020-06-25 DIAGNOSIS — D75.89 MACROCYTOSIS WITHOUT ANEMIA: ICD-10-CM

## 2020-06-25 DIAGNOSIS — N18.30 CKD (CHRONIC KIDNEY DISEASE) STAGE 3, GFR 30-59 ML/MIN (H): ICD-10-CM

## 2020-06-25 DIAGNOSIS — E03.9 HYPOTHYROIDISM, UNSPECIFIED TYPE: ICD-10-CM

## 2020-06-25 DIAGNOSIS — I48.0 PAROXYSMAL ATRIAL FIBRILLATION (H): ICD-10-CM

## 2020-06-25 LAB
ALBUMIN SERPL-MCNC: 3.8 G/DL (ref 3.4–5)
ALP SERPL-CCNC: 76 U/L (ref 40–150)
ALT SERPL W P-5'-P-CCNC: 35 U/L (ref 0–70)
ANION GAP SERPL CALCULATED.3IONS-SCNC: 4 MMOL/L (ref 3–14)
AST SERPL W P-5'-P-CCNC: 24 U/L (ref 0–45)
BASOPHILS # BLD AUTO: 0 10E9/L (ref 0–0.2)
BASOPHILS NFR BLD AUTO: 0.2 %
BILIRUB SERPL-MCNC: 0.8 MG/DL (ref 0.2–1.3)
BUN SERPL-MCNC: 17 MG/DL (ref 7–30)
CALCIUM SERPL-MCNC: 8.7 MG/DL (ref 8.5–10.1)
CHLORIDE SERPL-SCNC: 109 MMOL/L (ref 94–109)
CO2 SERPL-SCNC: 28 MMOL/L (ref 20–32)
CREAT SERPL-MCNC: 1.31 MG/DL (ref 0.66–1.25)
DIFFERENTIAL METHOD BLD: ABNORMAL
EOSINOPHIL # BLD AUTO: 0.1 10E9/L (ref 0–0.7)
EOSINOPHIL NFR BLD AUTO: 1.9 %
ERYTHROCYTE [DISTWIDTH] IN BLOOD BY AUTOMATED COUNT: 13.1 % (ref 10–15)
GFR SERPL CREATININE-BSD FRML MDRD: 50 ML/MIN/{1.73_M2}
GLUCOSE SERPL-MCNC: 93 MG/DL (ref 70–99)
HCT VFR BLD AUTO: 40.9 % (ref 40–53)
HGB BLD-MCNC: 13.5 G/DL (ref 13.3–17.7)
LYMPHOCYTES # BLD AUTO: 2.7 10E9/L (ref 0.8–5.3)
LYMPHOCYTES NFR BLD AUTO: 42.8 %
MCH RBC QN AUTO: 32.8 PG (ref 26.5–33)
MCHC RBC AUTO-ENTMCNC: 33 G/DL (ref 31.5–36.5)
MCV RBC AUTO: 100 FL (ref 78–100)
MONOCYTES # BLD AUTO: 0.8 10E9/L (ref 0–1.3)
MONOCYTES NFR BLD AUTO: 12.9 %
NEUTROPHILS # BLD AUTO: 2.7 10E9/L (ref 1.6–8.3)
NEUTROPHILS NFR BLD AUTO: 42.2 %
PLATELET # BLD AUTO: 154 10E9/L (ref 150–450)
POTASSIUM SERPL-SCNC: 4.5 MMOL/L (ref 3.4–5.3)
PROT SERPL-MCNC: 7.6 G/DL (ref 6.8–8.8)
RBC # BLD AUTO: 4.11 10E12/L (ref 4.4–5.9)
SODIUM SERPL-SCNC: 141 MMOL/L (ref 133–144)
TSH SERPL DL<=0.005 MIU/L-ACNC: 1.33 MU/L (ref 0.4–4)
WBC # BLD AUTO: 6.3 10E9/L (ref 4–11)

## 2020-06-25 PROCEDURE — 85025 COMPLETE CBC W/AUTO DIFF WBC: CPT | Performed by: FAMILY MEDICINE

## 2020-06-25 PROCEDURE — 36415 COLL VENOUS BLD VENIPUNCTURE: CPT | Performed by: FAMILY MEDICINE

## 2020-06-27 ENCOUNTER — MYC MEDICAL ADVICE (OUTPATIENT)
Dept: FAMILY MEDICINE | Facility: CLINIC | Age: 80
End: 2020-06-27

## 2020-06-29 NOTE — TELEPHONE ENCOUNTER
I see TSH, CBC, CMP done 6/25/20.   Has future ferritin order in Epic but I don't see order for FLP or PSA.      LDL Cholesterol Calculated   Date Value Ref Range Status   06/27/2019 73 <100 mg/dL Final     Comment:     Desirable:       <100 mg/dl     PSA   Date Value Ref Range Status   06/27/2019 0.96 0 - 4 ug/L Final     Comment:     Assay Method:  Chemiluminescence using Siemens Vista analyzer       See patient's MyChart request.    I see patient requested labs via [employer] message for lab only visit.    Routed to provider to address patient request.   Team can see if appropriate samples yet available if more testing indicated.    Annette Weller RN  [facility]

## 2020-10-19 DIAGNOSIS — I48.0 PAROXYSMAL ATRIAL FIBRILLATION (H): ICD-10-CM

## 2020-10-20 NOTE — TELEPHONE ENCOUNTER
Failed protocol    Requested Prescriptions   Pending Prescriptions Disp Refills     apixaban ANTICOAGULANT (ELIQUIS ANTICOAGULANT) 5 MG tablet 60 tablet 11     Sig: Take 1 tablet (5 mg) by mouth 2 times daily       Direct Oral Anticoagulant Agents Failed - 10/19/2020 10:26 AM        Failed - Patient is 18-79 years of age        Failed - Recent (6 mo) or future (30 days) visit within the authorizing provider's specialty        Passed - Normal Platelets on file in past 12 months     Recent Labs   Lab Test 06/25/20  0914                  Passed - Medication is active on med list        Passed - Serum creatinine less than or equal to 1.4 on file in past 12 mos     Recent Labs   Lab Test 06/25/20  0914   CR 1.31*       Ok to refill medication if creatinine is low          Passed - Weight is greater than 60 kg for the past year     Wt Readings from Last 3 Encounters:   02/19/20 253 lb (114.8 kg)   01/10/20 252 lb (114.3 kg)   12/31/19 248 lb 12.8 oz (112.9 kg)

## 2020-12-09 ENCOUNTER — MYC MEDICAL ADVICE (OUTPATIENT)
Dept: FAMILY MEDICINE | Facility: CLINIC | Age: 80
End: 2020-12-09

## 2020-12-09 DIAGNOSIS — E78.5 HYPERLIPIDEMIA LDL GOAL <100: ICD-10-CM

## 2020-12-09 DIAGNOSIS — I10 HYPERTENSION GOAL BP (BLOOD PRESSURE) < 140/90: ICD-10-CM

## 2020-12-09 DIAGNOSIS — Z00.00 ANNUAL PHYSICAL EXAM: Primary | ICD-10-CM

## 2020-12-09 NOTE — TELEPHONE ENCOUNTER
Please see [employer] message.     Routed to PCP to review and advise.     Laine Mitchell RN, BSN, PHN  [facility] [place]

## 2020-12-18 ENCOUNTER — MYC MEDICAL ADVICE (OUTPATIENT)
Dept: FAMILY MEDICINE | Facility: CLINIC | Age: 80
End: 2020-12-18

## 2020-12-18 DIAGNOSIS — N18.30 CKD (CHRONIC KIDNEY DISEASE) STAGE 3, GFR 30-59 ML/MIN (H): ICD-10-CM

## 2020-12-18 DIAGNOSIS — E78.5 HYPERLIPIDEMIA LDL GOAL <100: ICD-10-CM

## 2020-12-18 DIAGNOSIS — I10 HYPERTENSION GOAL BP (BLOOD PRESSURE) < 140/90: ICD-10-CM

## 2020-12-18 RX ORDER — LOVASTATIN 40 MG
40 TABLET ORAL AT BEDTIME
Qty: 90 TABLET | Refills: 0 | Status: SHIPPED | OUTPATIENT
Start: 2020-12-18 | End: 2021-01-12

## 2020-12-18 RX ORDER — LOSARTAN POTASSIUM 100 MG/1
100 TABLET ORAL DAILY
Qty: 90 TABLET | Refills: 0 | Status: SHIPPED | OUTPATIENT
Start: 2020-12-18 | End: 2021-01-12

## 2020-12-18 NOTE — TELEPHONE ENCOUNTER
Routing request for refill    Patient has an establish visit with you 1/12/21  He will run out of his prescription before that time. Ok to reorder?      Venice Howard RN

## 2021-01-06 DIAGNOSIS — N18.30 CKD (CHRONIC KIDNEY DISEASE) STAGE 3, GFR 30-59 ML/MIN (H): ICD-10-CM

## 2021-01-06 DIAGNOSIS — E61.1 IRON DEFICIENCY: ICD-10-CM

## 2021-01-06 DIAGNOSIS — Z00.00 ANNUAL PHYSICAL EXAM: ICD-10-CM

## 2021-01-06 DIAGNOSIS — E78.5 HYPERLIPIDEMIA LDL GOAL <100: ICD-10-CM

## 2021-01-06 DIAGNOSIS — I10 HYPERTENSION GOAL BP (BLOOD PRESSURE) < 140/90: ICD-10-CM

## 2021-01-06 DIAGNOSIS — D75.89 MACROCYTOSIS WITHOUT ANEMIA: ICD-10-CM

## 2021-01-06 LAB
BASOPHILS # BLD AUTO: 0 10E9/L (ref 0–0.2)
BASOPHILS NFR BLD AUTO: 0.2 %
DIFFERENTIAL METHOD BLD: ABNORMAL
EOSINOPHIL # BLD AUTO: 0.1 10E9/L (ref 0–0.7)
EOSINOPHIL NFR BLD AUTO: 1.9 %
ERYTHROCYTE [DISTWIDTH] IN BLOOD BY AUTOMATED COUNT: 13.1 % (ref 10–15)
HCT VFR BLD AUTO: 40.5 % (ref 40–53)
HGB BLD-MCNC: 13.4 G/DL (ref 13.3–17.7)
LYMPHOCYTES # BLD AUTO: 2.8 10E9/L (ref 0.8–5.3)
LYMPHOCYTES NFR BLD AUTO: 43.5 %
MCH RBC QN AUTO: 33.6 PG (ref 26.5–33)
MCHC RBC AUTO-ENTMCNC: 33.1 G/DL (ref 31.5–36.5)
MCV RBC AUTO: 102 FL (ref 78–100)
MONOCYTES # BLD AUTO: 0.8 10E9/L (ref 0–1.3)
MONOCYTES NFR BLD AUTO: 13.1 %
NEUTROPHILS # BLD AUTO: 2.6 10E9/L (ref 1.6–8.3)
NEUTROPHILS NFR BLD AUTO: 41.3 %
PLATELET # BLD AUTO: 146 10E9/L (ref 150–450)
RBC # BLD AUTO: 3.99 10E12/L (ref 4.4–5.9)
WBC # BLD AUTO: 6.4 10E9/L (ref 4–11)

## 2021-01-06 PROCEDURE — 80061 LIPID PANEL: CPT | Performed by: FAMILY MEDICINE

## 2021-01-06 PROCEDURE — 36415 COLL VENOUS BLD VENIPUNCTURE: CPT | Performed by: FAMILY MEDICINE

## 2021-01-06 PROCEDURE — 85025 COMPLETE CBC W/AUTO DIFF WBC: CPT | Performed by: FAMILY MEDICINE

## 2021-01-06 PROCEDURE — 82728 ASSAY OF FERRITIN: CPT | Performed by: FAMILY MEDICINE

## 2021-01-06 PROCEDURE — 80053 COMPREHEN METABOLIC PANEL: CPT | Performed by: FAMILY MEDICINE

## 2021-01-07 LAB
ALBUMIN SERPL-MCNC: 3.6 G/DL (ref 3.4–5)
ALP SERPL-CCNC: 78 U/L (ref 40–150)
ALT SERPL W P-5'-P-CCNC: 36 U/L (ref 0–70)
ANION GAP SERPL CALCULATED.3IONS-SCNC: 4 MMOL/L (ref 3–14)
AST SERPL W P-5'-P-CCNC: 26 U/L (ref 0–45)
BILIRUB SERPL-MCNC: 0.9 MG/DL (ref 0.2–1.3)
BUN SERPL-MCNC: 18 MG/DL (ref 7–30)
CALCIUM SERPL-MCNC: 9 MG/DL (ref 8.5–10.1)
CHLORIDE SERPL-SCNC: 108 MMOL/L (ref 94–109)
CHOLEST SERPL-MCNC: 130 MG/DL
CO2 SERPL-SCNC: 27 MMOL/L (ref 20–32)
CREAT SERPL-MCNC: 1.33 MG/DL (ref 0.66–1.25)
FERRITIN SERPL-MCNC: 197 NG/ML (ref 26–388)
GFR SERPL CREATININE-BSD FRML MDRD: 49 ML/MIN/{1.73_M2}
GLUCOSE SERPL-MCNC: 88 MG/DL (ref 70–99)
HDLC SERPL-MCNC: 52 MG/DL
LDLC SERPL CALC-MCNC: 67 MG/DL
NONHDLC SERPL-MCNC: 78 MG/DL
POTASSIUM SERPL-SCNC: 4.6 MMOL/L (ref 3.4–5.3)
PROT SERPL-MCNC: 7.2 G/DL (ref 6.8–8.8)
SODIUM SERPL-SCNC: 139 MMOL/L (ref 133–144)
TRIGL SERPL-MCNC: 57 MG/DL

## 2021-01-07 ASSESSMENT — ENCOUNTER SYMPTOMS: COUGH: 1

## 2021-01-07 ASSESSMENT — ACTIVITIES OF DAILY LIVING (ADL): CURRENT_FUNCTION: NO ASSISTANCE NEEDED

## 2021-01-12 ENCOUNTER — MYC MEDICAL ADVICE (OUTPATIENT)
Dept: CARDIOLOGY | Facility: CLINIC | Age: 81
End: 2021-01-12

## 2021-01-12 ENCOUNTER — OFFICE VISIT (OUTPATIENT)
Dept: FAMILY MEDICINE | Facility: CLINIC | Age: 81
End: 2021-01-12
Payer: MEDICARE

## 2021-01-12 VITALS
BODY MASS INDEX: 35.42 KG/M2 | HEIGHT: 71 IN | OXYGEN SATURATION: 97 % | WEIGHT: 253 LBS | HEART RATE: 69 BPM | DIASTOLIC BLOOD PRESSURE: 84 MMHG | SYSTOLIC BLOOD PRESSURE: 110 MMHG | TEMPERATURE: 97.7 F

## 2021-01-12 DIAGNOSIS — E03.9 ACQUIRED HYPOTHYROIDISM: ICD-10-CM

## 2021-01-12 DIAGNOSIS — I10 HYPERTENSION GOAL BP (BLOOD PRESSURE) < 140/90: ICD-10-CM

## 2021-01-12 DIAGNOSIS — E78.2 MIXED HYPERLIPIDEMIA: ICD-10-CM

## 2021-01-12 DIAGNOSIS — I10 BENIGN ESSENTIAL HYPERTENSION: ICD-10-CM

## 2021-01-12 DIAGNOSIS — I48.0 PAROXYSMAL ATRIAL FIBRILLATION (H): ICD-10-CM

## 2021-01-12 DIAGNOSIS — Z00.00 ENCOUNTER FOR MEDICARE ANNUAL WELLNESS EXAM: Primary | ICD-10-CM

## 2021-01-12 DIAGNOSIS — E78.5 HYPERLIPIDEMIA LDL GOAL <100: ICD-10-CM

## 2021-01-12 PROCEDURE — G0439 PPPS, SUBSEQ VISIT: HCPCS | Performed by: FAMILY MEDICINE

## 2021-01-12 RX ORDER — LEVOTHYROXINE SODIUM 200 UG/1
200 TABLET ORAL DAILY
Qty: 90 TABLET | Refills: 3 | Status: SHIPPED | OUTPATIENT
Start: 2021-01-12 | End: 2021-02-09

## 2021-01-12 RX ORDER — LOVASTATIN 40 MG
40 TABLET ORAL AT BEDTIME
Qty: 90 TABLET | Refills: 3 | Status: SHIPPED | OUTPATIENT
Start: 2021-01-12 | End: 2021-02-09

## 2021-01-12 RX ORDER — LOSARTAN POTASSIUM 100 MG/1
100 TABLET ORAL DAILY
Qty: 90 TABLET | Refills: 3 | Status: SHIPPED | OUTPATIENT
Start: 2021-01-12 | End: 2021-02-09

## 2021-01-12 RX ORDER — METOPROLOL SUCCINATE 100 MG/1
150 TABLET, EXTENDED RELEASE ORAL DAILY
Qty: 135 TABLET | Refills: 3 | Status: SHIPPED | OUTPATIENT
Start: 2021-01-12 | End: 2021-02-09

## 2021-01-12 ASSESSMENT — ENCOUNTER SYMPTOMS
CARDIOVASCULAR NEGATIVE: 1
ALLERGIC/IMMUNOLOGIC NEGATIVE: 1
GASTROINTESTINAL NEGATIVE: 1
ENDOCRINE NEGATIVE: 1
EYES NEGATIVE: 1
CONSTITUTIONAL NEGATIVE: 1
COUGH: 1
PSYCHIATRIC NEGATIVE: 1
HEMATOLOGIC/LYMPHATIC NEGATIVE: 1
NEUROLOGICAL NEGATIVE: 1
MUSCULOSKELETAL NEGATIVE: 1

## 2021-01-12 ASSESSMENT — ACTIVITIES OF DAILY LIVING (ADL): CURRENT_FUNCTION: NO ASSISTANCE NEEDED

## 2021-01-12 ASSESSMENT — PAIN SCALES - GENERAL: PAINLEVEL: NO PAIN (0)

## 2021-01-12 ASSESSMENT — MIFFLIN-ST. JEOR: SCORE: 1869.73

## 2021-01-12 NOTE — PATIENT INSTRUCTIONS
Patient Education   Personalized Prevention Plan  You are due for the preventive services outlined below.  Your care team is available to assist you in scheduling these services.  If you have already completed any of these items, please share that information with your care team to update in your medical record.  Health Maintenance Due   Topic Date Due     Prostate Test  06/27/2020     Kidney Microalbumin Urine Test  12/31/2020     FALL RISK ASSESSMENT  12/31/2020     Annual Wellness Visit  12/31/2020

## 2021-01-12 NOTE — PROGRESS NOTES
"SUBJECTIVE:   Sami Harmon is a 80 year old male who presents for Preventive Visit.  Patient comes for an annual physical exam.  Reviewed lab results with patient.    Patient has been advised of split billing requirements and indicates understanding: Yes   Are you in the first 12 months of your Medicare coverage?  No    Healthy Habits:     In general, how would you rate your overall health?  Good    Frequency of exercise:  4-5 days/week    Duration of exercise:  30-45 minutes    Do you usually eat at least 4 servings of fruit and vegetables a day, include whole grains    & fiber and avoid regularly eating high fat or \"junk\" foods?  No    Taking medications regularly:  Yes    Barriers to taking medications:  None    Medication side effects:  None    Ability to successfully perform activities of daily living:  No assistance needed    Home Safety:  No safety concerns identified    Hearing Impairment:  Difficulty following a conversation in a noisy restaurant or crowded room, feel that people are mumbling or not speaking clearly, need to ask people to speak up or repeat themselves, find that men's voices are easier to understand than woman's and difficulty understanding soft or whispered speech    In the past 6 months, have you been bothered by leaking of urine?  No    In general, how would you rate your overall mental or emotional health?  Excellent      PHQ-2 Total Score: 0    Additional concerns today:  Yes    Do you feel safe in your environment? Yes    Have you ever done Advance Care Planning? (For example, a Health Directive, POLST, or a discussion with a medical provider or your loved ones about your wishes): Yes, advance care planning is on file.      Fall risk  Fallen 2 or more times in the past year?: No  Any fall with injury in the past year?: No    Cognitive Screening   1) Repeat 3 items (Leader, Season, Table)    2) Clock draw: NORMAL  3) 3 item recall: Recalls 3 objects  Results: 3 items recalled: " COGNITIVE IMPAIRMENT LESS LIKELY    Mini-CogTM Copyright ISA Paniagua. Licensed by the author for use in [facility]; reprinted with permission (janeth@.[facility]). All rights reserved.      Do you have sleep apnea, excessive snoring or daytime drowsiness?: yes    Reviewed and updated as needed this visit by clinical staff  Tobacco  Allergies  Meds   Med Hx  Surg Hx  Fam Hx  Soc Hx        Reviewed and updated as needed this visit by Provider                Social History     Tobacco Use     Smoking status: Never Smoker     Smokeless tobacco: Former User     Types: Chew     Tobacco comment: Chewed tobacco for 30 years   Substance Use Topics     Alcohol use: Yes     Comment: 1 or less drink per day     If you drink alcohol do you typically have >3 drinks per day or >7 drinks per week? No    Alcohol Use 1/7/2021   Prescreen: >3 drinks/day or >7 drinks/week? No   Prescreen: >3 drinks/day or >7 drinks/week? -   No flowsheet data found.    Current providers sharing in care for this patient include:   Patient Care Team:  Mona Real RN as Specialty Care Coordinator (Cardiology)  Sonam Dumas APRN CNP as Assigned PCP    The following health maintenance items are reviewed in Epic and correct as of today:  Health Maintenance   Topic Date Due     PSA  06/27/2020     MICROALBUMIN  12/31/2020     FALL RISK ASSESSMENT  12/31/2020     MEDICARE ANNUAL WELLNESS VISIT  12/31/2020     BMP  01/06/2022     LIPID  01/06/2022     DTAP/TDAP/TD IMMUNIZATION (3 - Td) 10/10/2022     COLORECTAL CANCER SCREENING  11/08/2022     ADVANCE CARE PLANNING  01/01/2025     PHQ-2  Completed     INFLUENZA VACCINE  Completed     Pneumococcal Vaccine: 65+ Years  Completed     ZOSTER IMMUNIZATION  Completed     Pneumococcal Vaccine: Pediatrics (0 to 5 Years) and At-Risk Patients (6 to 64 Years)  Aged Out     IPV IMMUNIZATION  Aged Out     MENINGITIS IMMUNIZATION  Aged Out     HEPATITIS B IMMUNIZATION  Aged Out     BP Readings  from Last 3 Encounters:   01/12/21 110/84   02/19/20 118/76   01/10/20 137/84    Wt Readings from Last 3 Encounters:   01/12/21 114.8 kg (253 lb)   02/19/20 114.8 kg (253 lb)   01/10/20 114.3 kg (252 lb)                  Patient Active Problem List   Diagnosis     Family history of prostate cancer     Hyperlipidemia LDL goal <100     CKD (chronic kidney disease) stage 3, GFR 30-59 ml/min     Advance care planning     Primary localized osteoarthrosis, pelvic region and thigh     History of colon polyps     Advanced directives, counseling/discussion     Hypertension goal BP (blood pressure) < 140/90     Overweight     Paroxysmal atrial fibrillation (H)     Acquired hypothyroidism     Obesity (BMI 30-39.9)     Chorioretinal scar     Past Surgical History:   Procedure Laterality Date     ABDOMEN SURGERY  1970's, 1980's    Two hernia surgerys     BIOPSY  July 2016    polyp removed from vocal cord - benign     COLONOSCOPY       COSMETIC SURGERY       EYE SURGERY  2000    Macular scar, presently being treated with avastin     HERNIA REPAIR, INGUINAL RT/LT  1970,1980     ORTHOPEDIC SURGERY  April 2015, June 2015    Right hip replacement, Left hip replacement     SURGICAL HISTORY OF -   2000    cholesteatoma removal L     SURGICAL HISTORY OF -   3/2004    laser macular     SURGICAL HISTORY OF -   4-2015    right hip replacement     TONSILLECTOMY & ADENOIDECTOMY  1961       Social History     Tobacco Use     Smoking status: Never Smoker     Smokeless tobacco: Former User     Types: Chew     Tobacco comment: Chewed tobacco for 30 years   Substance Use Topics     Alcohol use: Yes     Comment: 1 or less drink per day     Family History   Problem Relation Age of Onset     Arthritis Mother      Eye Disorder Mother      Diabetes Mother         type 2     Hypertension Mother      Cancer - colorectal Mother      Cerebrovascular Disease Mother      Osteoporosis Mother      Obesity Mother      Colon Cancer Mother      Heart Disease  Father      Prostate Cancer Father      Cerebrovascular Disease Father      Heart Disease Brother      Prostate Cancer Brother      Coronary Artery Disease Brother      Gastrointestinal Disease Son      Prostate Cancer Brother          Current Outpatient Medications   Medication Sig Dispense Refill     apixaban ANTICOAGULANT (ELIQUIS ANTICOAGULANT) 5 MG tablet Take 1 tablet (5 mg) by mouth 2 times daily 180 tablet 3     Ferrous Sulfate (IRON SUPPLEMENT PO) Take by mouth daily        latanoprost (XALATAN) 0.005 % ophthalmic solution Place 1 drop into both eyes daily       levothyroxine (SYNTHROID/LEVOTHROID) 200 MCG tablet Take 1 tablet (200 mcg) by mouth daily 90 tablet 3     losartan (COZAAR) 100 MG tablet Take 1 tablet (100 mg) by mouth daily +++Related visit due+++ 90 tablet 3     lovastatin (MEVACOR) 40 MG tablet Take 1 tablet (40 mg) by mouth At Bedtime 90 tablet 3     metoprolol succinate ER (TOPROL XL) 100 MG 24 hr tablet Take 1.5 tablets (150 mg) by mouth daily 135 tablet 3     MULTI-VITAMIN OR 1 tablets  daily       Psyllium (METAMUCIL PO) Take by mouth daily 1 teaspoon added to 1 glass of water once daily       No Known Allergies  Recent Labs   Lab Test 01/06/21  0747 06/25/20  0914 12/24/19  0816 06/27/19  0742 06/27/19  0742 06/21/18  0745 06/21/18  0745   LDL 67  --   --   --  73  --  86   HDL 52  --   --   --  56  --  70   TRIG 57  --   --   --  62  --  45   ALT 36 35 36  --   --   --   --    CR 1.33* 1.31* 1.36*  --   --    < > 1.35*   GFRESTIMATED 49* 50* 48*  --   --    < > 51*   GFRESTBLACK 57* 58* 56*  --   --    < > 62   POTASSIUM 4.6 4.5 4.3  --   --    < > 4.2   TSH  --  1.33 6.04*   < > 11.11*   < >  --     < > = values in this interval not displayed.        Review of Systems   Constitutional: Negative.    HENT: Positive for hearing loss.    Eyes: Negative.    Respiratory: Positive for cough.    Cardiovascular: Negative.    Gastrointestinal: Negative.    Endocrine: Negative.    Genitourinary:  "Negative for impotence.   Musculoskeletal: Negative.    Skin: Negative.    Allergic/Immunologic: Negative.    Neurological: Negative.    Hematological: Negative.    Psychiatric/Behavioral: Negative.      Constitutional, HEENT, cardiovascular, pulmonary, GI, , musculoskeletal, neuro, skin, endocrine and psych systems are negative, except as otherwise noted.    OBJECTIVE:   Pulse 69   Temp 97.7  F (36.5  C) (Oral)   Ht 1.803 m (5' 11\")   Wt 114.8 kg (253 lb)   SpO2 97%   BMI 35.29 kg/m   Estimated body mass index is 35.29 kg/m  as calculated from the following:    Height as of this encounter: 1.803 m (5' 11\").    Weight as of this encounter: 114.8 kg (253 lb).  Physical Exam  GENERAL: healthy, alert and no distress  HENT: ear canals and TM's normal, nose and mouth without ulcers or lesions  NECK: no adenopathy, no asymmetry, masses, or scars and thyroid normal to palpation  RESP: lungs clear to auscultation - no rales, rhonchi or wheezes  CV: regular rate and rhythm, normal S1 S2, no S3 or S4, no murmur, click or rub, no peripheral edema and peripheral pulses strong  ABDOMEN: soft, nontender, no hepatosplenomegaly, no masses and bowel sounds normal  MS: no gross musculoskeletal defects noted, no edema  SKIN: no suspicious lesions or rashes  NEURO: Normal strength and tone, mentation intact and speech normal  PSYCH: mentation appears normal, affect normal/bright    Diagnostic Test Results:  Labs reviewed in Epic  Lab Results   Component Value Date    WBC 6.4 01/06/2021     Lab Results   Component Value Date    RBC 3.99 01/06/2021     Lab Results   Component Value Date    HGB 13.4 01/06/2021     Lab Results   Component Value Date    HCT 40.5 01/06/2021     No components found for: MCT  Lab Results   Component Value Date     01/06/2021     Lab Results   Component Value Date    MCH 33.6 01/06/2021     Lab Results   Component Value Date    MCHC 33.1 01/06/2021     Lab Results   Component Value Date    RDW " 13.1 01/06/2021     Lab Results   Component Value Date     01/06/2021     Last Comprehensive Metabolic Panel:  Sodium   Date Value Ref Range Status   01/06/2021 139 133 - 144 mmol/L Final     Potassium   Date Value Ref Range Status   01/06/2021 4.6 3.4 - 5.3 mmol/L Final     Chloride   Date Value Ref Range Status   01/06/2021 108 94 - 109 mmol/L Final     Carbon Dioxide   Date Value Ref Range Status   01/06/2021 27 20 - 32 mmol/L Final     Anion Gap   Date Value Ref Range Status   01/06/2021 4 3 - 14 mmol/L Final     Glucose   Date Value Ref Range Status   01/06/2021 88 70 - 99 mg/dL Final     Comment:     Fasting specimen     Urea Nitrogen   Date Value Ref Range Status   01/06/2021 18 7 - 30 mg/dL Final     Creatinine   Date Value Ref Range Status   01/06/2021 1.33 (H) 0.66 - 1.25 mg/dL Final     GFR Estimate   Date Value Ref Range Status   01/06/2021 49 (L) >60 mL/min/[1.73_m2] Final     Comment:     Non  GFR Calc  Starting 12/18/2018, serum creatinine based estimated GFR (eGFR) will be   calculated using the Chronic Kidney Disease Epidemiology Collaboration   (CKD-EPI) equation.       Calcium   Date Value Ref Range Status   01/06/2021 9.0 8.5 - 10.1 mg/dL Final     Bilirubin Total   Date Value Ref Range Status   01/06/2021 0.9 0.2 - 1.3 mg/dL Final     Alkaline Phosphatase   Date Value Ref Range Status   01/06/2021 78 40 - 150 U/L Final     ALT   Date Value Ref Range Status   01/06/2021 36 0 - 70 U/L Final     AST   Date Value Ref Range Status   01/06/2021 26 0 - 45 U/L Final     Recent Labs   Lab Test 01/06/21  0747 06/27/19  0742 04/01/15  0902 04/01/15  0902 04/17/14  0713   CHOL 130 141   < > 157 152   HDL 52 56   < > 74 56   LDL 67 73   < > 73 82   TRIG 57 62   < > 48 73   CHOLHDLRATIO  --   --   --  2.1 2.7    < > = values in this interval not displayed.     TSH   Date Value Ref Range Status   06/25/2020 1.33 0.40 - 4.00 mU/L Final   12/24/2019 6.04 (H) 0.40 - 4.00 mU/L Final    07/30/2019 7.73 (H) 0.40 - 4.00 mU/L Final   06/27/2019 11.11 (H) 0.40 - 4.00 mU/L Final   06/06/2019 11.09 (H) 0.40 - 4.00 mU/L Final       ASSESSMENT / PLAN:   1. Encounter for Medicare annual wellness exam  Preventive health care discussed.  Reviewed lab result  Filled medications,    2. Mixed hyperlipidemia  Discussed diet and weight loss  - metoprolol succinate ER (TOPROL XL) 100 MG 24 hr tablet; Take 1.5 tablets (150 mg) by mouth daily  Dispense: 135 tablet; Refill: 3    3. Paroxysmal atrial fibrillation (H)  Stable, continue with current medications  Follow up with [facility]  - metoprolol succinate ER (TOPROL XL) 100 MG 24 hr tablet; Take 1.5 tablets (150 mg) by mouth daily  Dispense: 135 tablet; Refill: 3  - apixaban ANTICOAGULANT (ELIQUIS ANTICOAGULANT) 5 MG tablet; Take 1 tablet (5 mg) by mouth 2 times daily  Dispense: 180 tablet; Refill: 3    4. Benign essential hypertension    - metoprolol succinate ER (TOPROL XL) 100 MG 24 hr tablet; Take 1.5 tablets (150 mg) by mouth daily  Dispense: 135 tablet; Refill: 3    5. Hyperlipidemia LDL goal <100    - lovastatin (MEVACOR) 40 MG tablet; Take 1 tablet (40 mg) by mouth At Bedtime  Dispense: 90 tablet; Refill: 3    6. Hypertension goal BP (blood pressure) < 140/90    - losartan (COZAAR) 100 MG tablet; Take 1 tablet (100 mg) by mouth daily +++Related visit due+++  Dispense: 90 tablet; Refill: 3    7. Acquired hypothyroidism    - levothyroxine (SYNTHROID/LEVOTHROID) 200 MCG tablet; Take 1 tablet (200 mcg) by mouth daily  Dispense: 90 tablet; Refill: 3    Patient has been advised of split billing requirements and indicates understanding: Yes  COUNSELING:  Reviewed preventive health counseling, as reflected in patient instructions       Regular exercise       Healthy diet/nutrition       Vision screening       Hearing screening       Dental care       Bladder control       Fall risk prevention    Estimated body mass index is 35.29 kg/m  as calculated from the  "following:    Height as of this encounter: 1.803 m (5' 11\").    Weight as of this encounter: 114.8 kg (253 lb).    Weight management plan: Discussed healthy diet and exercise guidelines    He reports that he has never smoked. He quit smokeless tobacco use about 20 years ago.  His smokeless tobacco use included chew.      Appropriate preventive services were discussed with this patient, including applicable screening as appropriate for cardiovascular disease, diabetes, osteopenia/osteoporosis, and glaucoma.  As appropriate for age/gender, discussed screening for colorectal cancer, prostate cancer, breast cancer, and cervical cancer. Checklist reviewing preventive services available has been given to the patient.    Reviewed patients plan of care and provided an AVS. The Basic Care Plan (routine screening as documented in Health Maintenance) for Sami meets the Care Plan requirement. This Care Plan has been established and reviewed with the Patient.    Counseling Resources:  ATP IV Guidelines  Pooled Cohorts Equation Calculator  Breast Cancer Risk Calculator  Breast Cancer: Medication to Reduce Risk  FRAX Risk Assessment  ICSI Preventive Guidelines  Dietary Guidelines for Americans, 2010  USDA's MyPlate  ASA Prophylaxis  Lung CA Screening    Huy Ma MD  [facility]    Identified Health Risks:  "

## 2021-02-08 NOTE — PROGRESS NOTES
Khadar is a 80 year old who is being evaluated via a billable telephone visit.      What phone number would you like to be contacted at? cell  How would you like to obtain your AVS? Brian    Phone call duration: 22 minutes    February 8, 2021  Faustino Resendiz is an 77 yo gentleman with minimal past medical history including HTN, HL seen by primary care provider and then by myself for atrial fibrillation. He was checking his blood pressure at home and he did notice that his blood pressure cuff shows irregular heart rate.  This prompted him asking his primary care provider about the regular heart rate and disease how his atrial fibrillation was discovered again he is completely symptomatic. He then came to cardiology clinic, was in sinus rhythm. Was started on low dose metoprolol and Ziopatch has been placed as results as below confirming paroxysmal atrial fibrillation, rate controlled with a burden of 62%. Today he returns to follow up  He has been doing good from the cardiac standpoint with no new complaints.  He notes that he is heart rate is mostly from variable going somewhere between 57 and 75 bpm.  His blood pressure is running around 110 mmHg systolic value.  There is no complaints no dizziness lightheadedness.  No bleeding issues tolerates apixaban well.  No other complaints today     PAST MEDICAL HISTORY:  Past Medical History:   Diagnosis Date     Acquired hypothyroidism 12/31/2018     Arthritis 2010     Essential hypertension, benign 2004     Macular degeneration 1980s     Paroxysmal atrial fibrillation (H) 12/31/2018     Pure hypercholesterolemia 2004    goal ldl <100     FAMILY HISTORY:  Family History   Problem Relation Age of Onset     Arthritis Mother      Eye Disorder Mother      Diabetes Mother         type 2     Hypertension Mother      Cancer - colorectal Mother      Cerebrovascular Disease Mother      Osteoporosis Mother      Obesity Mother      Colon Cancer Mother      Heart Disease Father       Prostate Cancer Father      Cerebrovascular Disease Father      Heart Disease Brother      Prostate Cancer Brother      Coronary Artery Disease Brother      Gastrointestinal Disease Son      Prostate Cancer Brother      SOCIAL HISTORY:  Social History     Socioeconomic History     Marital status:      Spouse name: Meredith     Number of children: 4     Years of education: Not on file     Highest education level: Not on file   Occupational History     Occupation:      Employer: RETIRED   Social Needs     Financial resource strain: Not on file     Food insecurity     Worry: Not on file     Inability: Not on file     Transportation needs     Medical: Not on file     Non-medical: Not on file   Tobacco Use     Smoking status: Never Smoker     Smokeless tobacco: Former User     Types: Chew     Tobacco comment: Chewed tobacco for 30 years   Substance and Sexual Activity     Alcohol use: Yes     Comment: 1 or less drink per day     Drug use: No     Sexual activity: Never     Partners: Female   Lifestyle     Physical activity     Days per week: Not on file     Minutes per session: Not on file     Stress: Not on file   Relationships     Social connections     Talks on phone: Not on file     Gets together: Not on file     Attends [employer] service: Not on file     Active member of club or organization: Not on file     Attends meetings of clubs or organizations: Not on file     Relationship status: Not on file     Intimate partner violence     Fear of current or ex partner: Not on file     Emotionally abused: Not on file     Physically abused: Not on file     Forced sexual activity: Not on file   Other Topics Concern     Parent/sibling w/ CABG, MI or angioplasty before 65F 55M? No      Service No     Blood Transfusions No     Caffeine Concern No     Occupational Exposure No     Hobby Hazards No     Sleep Concern No     Stress Concern No     Weight Concern No     Special Diet No     Back Care No      Exercise No     Bike Helmet No     Seat Belt Yes     Self-Exams Yes   Social History Narrative     Not on file     CURRENT MEDICATIONS:  Current Outpatient Medications   Medication     apixaban ANTICOAGULANT (ELIQUIS ANTICOAGULANT) 5 MG tablet     Ferrous Sulfate (IRON SUPPLEMENT PO)     latanoprost (XALATAN) 0.005 % ophthalmic solution     levothyroxine (SYNTHROID/LEVOTHROID) 200 MCG tablet     losartan (COZAAR) 100 MG tablet     lovastatin (MEVACOR) 40 MG tablet     metoprolol succinate ER (TOPROL XL) 100 MG 24 hr tablet     MULTI-VITAMIN OR     Psyllium (METAMUCIL PO)     No current facility-administered medications for this visit.      ROS:   Constitutional: No fever, chills, or sweats.   ENT: No visual disturbance, ear ache, epistaxis, sore throat.   Allergies/Immunologic: Negative.   Respiratory: No cough, hemoptysis.   Cardiovascular: As per HPI.   GI: No nausea, vomiting, hematemesis, melena, or hematochezia.   : No urinary frequency, dysuria, or hematuria.   Integument: Negative.   Psychiatric: Pleasant, no major depression noted  Neuro: No focal neurological deficits noted  Endocrinology: Negative.   Musculoskeletal: As per HPI.      EXAM:  Limited evaluation due to phone encounter     Labs:  Lab Results   Component Value Date    WBC 6.4 01/06/2021    HGB 13.4 01/06/2021    HCT 40.5 01/06/2021     (L) 01/06/2021     01/06/2021    POTASSIUM 4.6 01/06/2021    CHLORIDE 108 01/06/2021    CO2 27 01/06/2021    BUN 18 01/06/2021    CR 1.33 (H) 01/06/2021    GLC 88 01/06/2021    AST 26 01/06/2021    ALT 36 01/06/2021    ALKPHOS 78 01/06/2021    BILITOTAL 0.9 01/06/2021    INR 1.0 06/04/2015     Echocardiogram reviewed 12/2018:  Global and regional left ventricular function is normal with an EF of 55-60%.  The right ventricle is normal size. Global right ventricular function is  Normal. Mild aortic valve sclerosis is present. The proximal Asc Aorta measures 4.2cm in diameter. The aortic root index is  1.8cm/m2 (Normal index for males is 1.4-1.7cm/m2) No pericardial effusion is present.     Ziopatch 3/2019:   62% a-fib burden, mostly rate controlled.      ASSESSMENT AND PLAN:  In summary, patient is a 80 year old history of hypertension hyperlipidemia and paroxysmal atrial fibrillation on apixaban who presented to the cardiology clinic to follow up.    Patient remains in atrial fibrillation however he is heart rate is well controlled.  His blood pressure is also pretty well controlled at this time.  He has no complications from apixaban use tolerated well.  Overall feels well.  Laboratory including CBC CMP reviewed in person.  Also reviewed most recent echocardiogram which was in 2018.  At this time we will continue the course without any significant changes.  All medications have been renewed for him today.  I will see him back in about a year or sooner as needed with a repeat echocardiogram as well as blood work.  He will call us with any questions or concerns.     Follow up:   1 year with labs and echo     I appreciate the opportunity to participate in the care of Sami Harmon . Please do not hesitate to contact me with any further questions.     Sincerely,   Benji Cornelius MD     [facility] Division of Cardiology

## 2021-02-09 ENCOUNTER — VIRTUAL VISIT (OUTPATIENT)
Dept: CARDIOLOGY | Facility: CLINIC | Age: 81
End: 2021-02-09
Attending: INTERNAL MEDICINE
Payer: MEDICARE

## 2021-02-09 DIAGNOSIS — I10 BENIGN ESSENTIAL HYPERTENSION: ICD-10-CM

## 2021-02-09 DIAGNOSIS — I10 HYPERTENSION GOAL BP (BLOOD PRESSURE) < 140/90: ICD-10-CM

## 2021-02-09 DIAGNOSIS — E03.9 ACQUIRED HYPOTHYROIDISM: ICD-10-CM

## 2021-02-09 DIAGNOSIS — I48.0 PAROXYSMAL ATRIAL FIBRILLATION (H): Primary | ICD-10-CM

## 2021-02-09 DIAGNOSIS — E78.5 HYPERLIPIDEMIA LDL GOAL <100: ICD-10-CM

## 2021-02-09 DIAGNOSIS — E78.2 MIXED HYPERLIPIDEMIA: ICD-10-CM

## 2021-02-09 DIAGNOSIS — I50.30 NYHA CLASS 3 HEART FAILURE WITH PRESERVED EJECTION FRACTION (H): ICD-10-CM

## 2021-02-09 PROCEDURE — 99443 PR PHYSICIAN TELEPHONE EVALUATION 21-30 MIN: CPT | Mod: 95 | Performed by: INTERNAL MEDICINE

## 2021-02-09 RX ORDER — LOVASTATIN 40 MG
40 TABLET ORAL AT BEDTIME
Qty: 90 TABLET | Refills: 3 | Status: SHIPPED | OUTPATIENT
Start: 2021-02-09 | End: 2022-02-24

## 2021-02-09 RX ORDER — METOPROLOL SUCCINATE 100 MG/1
150 TABLET, EXTENDED RELEASE ORAL DAILY
Qty: 135 TABLET | Refills: 3 | Status: SHIPPED | OUTPATIENT
Start: 2021-02-09 | End: 2022-03-18

## 2021-02-09 RX ORDER — LEVOTHYROXINE SODIUM 200 UG/1
200 TABLET ORAL DAILY
Qty: 90 TABLET | Refills: 3 | Status: SHIPPED | OUTPATIENT
Start: 2021-02-09 | End: 2022-03-18

## 2021-02-09 RX ORDER — LOSARTAN POTASSIUM 100 MG/1
100 TABLET ORAL DAILY
Qty: 90 TABLET | Refills: 3 | Status: SHIPPED | OUTPATIENT
Start: 2021-02-09 | End: 2022-02-24

## 2021-02-09 NOTE — PROGRESS NOTES
Khadar is a 80 year old who is being evaluated via a billable telephone visit.      What phone number would you like to be contacted at? [phone]  How would you like to obtain your AVS? [email or web address]

## 2021-02-09 NOTE — LETTER
2/9/2021      RE: Sami Harmon  [address] [place] [id number]       Dear Colleague,    Thank you for the opportunity to participate in the care of your patient, Sami Harmon, at the [facility]. Please see a copy of my visit note below.    Khadar is a 80 year old who is being evaluated via a billable telephone visit.      What phone number would you like to be contacted at? cell  How would you like to obtain your AVS? [email or web address]    Phone call duration: 22 minutes    February 8, 2021  Faustino Resendiz is an 77 yo gentleman with minimal past medical history including HTN, HL seen by primary care provider and then by myself for atrial fibrillation. He was checking his blood pressure at home and he did notice that his blood pressure cuff shows irregular heart rate.  This prompted him asking his primary care provider about the regular heart rate and disease how his atrial fibrillation was discovered again he is completely symptomatic. He then came to cardiology clinic, was in sinus rhythm. Was started on low dose metoprolol and Ziopatch has been placed as results as below confirming paroxysmal atrial fibrillation, rate controlled with a burden of 62%. Today he returns to follow up  He has been doing good from the cardiac standpoint with no new complaints.  He notes that he is heart rate is mostly from variable going somewhere between 57 and 75 bpm.  His blood pressure is running around 110 mmHg systolic value.  There is no complaints no dizziness lightheadedness.  No bleeding issues tolerates apixaban well.  No other complaints today     PAST MEDICAL HISTORY:  Past Medical History:   Diagnosis Date     Acquired hypothyroidism 12/31/2018     Arthritis 2010     Essential hypertension, benign 2004     Macular degeneration 1980s     Paroxysmal atrial fibrillation (H) 12/31/2018     Pure hypercholesterolemia 2004    goal ldl <100      FAMILY HISTORY:  Family History   Problem Relation Age of Onset     Arthritis Mother      Eye Disorder Mother      Diabetes Mother         type 2     Hypertension Mother      Cancer - colorectal Mother      Cerebrovascular Disease Mother      Osteoporosis Mother      Obesity Mother      Colon Cancer Mother      Heart Disease Father      Prostate Cancer Father      Cerebrovascular Disease Father      Heart Disease Brother      Prostate Cancer Brother      Coronary Artery Disease Brother      Gastrointestinal Disease Son      Prostate Cancer Brother      SOCIAL HISTORY:  Social History     Socioeconomic History     Marital status:      Spouse name: Meredith     Number of children: 4     Years of education: Not on file     Highest education level: Not on file   Occupational History     Occupation:      Employer: RETIRED   Social Needs     Financial resource strain: Not on file     Food insecurity     Worry: Not on file     Inability: Not on file     Transportation needs     Medical: Not on file     Non-medical: Not on file   Tobacco Use     Smoking status: Never Smoker     Smokeless tobacco: Former User     Types: Chew     Tobacco comment: Chewed tobacco for 30 years   Substance and Sexual Activity     Alcohol use: Yes     Comment: 1 or less drink per day     Drug use: No     Sexual activity: Never     Partners: Female   Lifestyle     Physical activity     Days per week: Not on file     Minutes per session: Not on file     Stress: Not on file   Relationships     Social connections     Talks on phone: Not on file     Gets together: Not on file     Attends Orthodoxy service: Not on file     Active member of club or organization: Not on file     Attends meetings of clubs or organizations: Not on file     Relationship status: Not on file     Intimate partner violence     Fear of current or ex partner: Not on file     Emotionally abused: Not on file     Physically abused: Not on file     Forced sexual  activity: Not on file   Other Topics Concern     Parent/sibling w/ CABG, MI or angioplasty before 65F 55M? No      Service No     Blood Transfusions No     Caffeine Concern No     Occupational Exposure No     Hobby Hazards No     Sleep Concern No     Stress Concern No     Weight Concern No     Special Diet No     Back Care No     Exercise No     Bike Helmet No     Seat Belt Yes     Self-Exams Yes   Social History Narrative     Not on file     CURRENT MEDICATIONS:  Current Outpatient Medications   Medication     apixaban ANTICOAGULANT (ELIQUIS ANTICOAGULANT) 5 MG tablet     Ferrous Sulfate (IRON SUPPLEMENT PO)     latanoprost (XALATAN) 0.005 % ophthalmic solution     levothyroxine (SYNTHROID/LEVOTHROID) 200 MCG tablet     losartan (COZAAR) 100 MG tablet     lovastatin (MEVACOR) 40 MG tablet     metoprolol succinate ER (TOPROL XL) 100 MG 24 hr tablet     MULTI-VITAMIN OR     Psyllium (METAMUCIL PO)     No current facility-administered medications for this visit.      ROS:   Constitutional: No fever, chills, or sweats.   ENT: No visual disturbance, ear ache, epistaxis, sore throat.   Allergies/Immunologic: Negative.   Respiratory: No cough, hemoptysis.   Cardiovascular: As per HPI.   GI: No nausea, vomiting, hematemesis, melena, or hematochezia.   : No urinary frequency, dysuria, or hematuria.   Integument: Negative.   Psychiatric: Pleasant, no major depression noted  Neuro: No focal neurological deficits noted  Endocrinology: Negative.   Musculoskeletal: As per HPI.      EXAM:  Limited evaluation due to phone encounter     Labs:  Lab Results   Component Value Date    WBC 6.4 01/06/2021    HGB 13.4 01/06/2021    HCT 40.5 01/06/2021     (L) 01/06/2021     01/06/2021    POTASSIUM 4.6 01/06/2021    CHLORIDE 108 01/06/2021    CO2 27 01/06/2021    BUN 18 01/06/2021    CR 1.33 (H) 01/06/2021    GLC 88 01/06/2021    AST 26 01/06/2021    ALT 36 01/06/2021    ALKPHOS 78 01/06/2021    BILITOTAL 0.9  01/06/2021    INR 1.0 06/04/2015     Echocardiogram reviewed 12/2018:  Global and regional left ventricular function is normal with an EF of 55-60%.  The right ventricle is normal size. Global right ventricular function is  Normal. Mild aortic valve sclerosis is present. The proximal Asc Aorta measures 4.2cm in diameter. The aortic root index is 1.8cm/m2 (Normal index for males is 1.4-1.7cm/m2) No pericardial effusion is present.     Ziopatch 3/2019:   62% a-fib burden, mostly rate controlled.      ASSESSMENT AND PLAN:  In summary, patient is a 80 year old history of hypertension hyperlipidemia and paroxysmal atrial fibrillation on apixaban who presented to the cardiology clinic to follow up.    Patient remains in atrial fibrillation however he is heart rate is well controlled.  His blood pressure is also pretty well controlled at this time.  He has no complications from apixaban use tolerated well.  Overall feels well.  Laboratory including CBC CMP reviewed in person.  Also reviewed most recent echocardiogram which was in 2018.  At this time we will continue the course without any significant changes.  All medications have been renewed for him today.  I will see him back in about a year or sooner as needed with a repeat echocardiogram as well as blood work.  He will call us with any questions or concerns.     Follow up:   1 year with labs and echo     I appreciate the opportunity to participate in the care of Sami Harmon . Please do not hesitate to contact me with any further questions.     Sincerely,   Benji Cornelius MD     [facility] Division of Cardiology     Bill is a 80 year old who is being evaluated via a billable telephone visit.      What phone number would you like to be contacted at? [phone]  How would you like to obtain your AVS? thereNowhart

## 2021-02-09 NOTE — PATIENT INSTRUCTIONS
Dr. Cornelius recommends:    Follow up clinic visit with Dr. Cornelius in one year with labs and echo same day prior to appointment.    Thank you for your visit today.  Please call me with any questions or concerns.   Piyush Lopez RN  Cardiology Care Coordinator  [phone], press option 1 then option 4

## 2021-02-19 ENCOUNTER — MYC MEDICAL ADVICE (OUTPATIENT)
Dept: FAMILY MEDICINE | Facility: CLINIC | Age: 81
End: 2021-02-19

## 2021-09-26 ENCOUNTER — HEALTH MAINTENANCE LETTER (OUTPATIENT)
Age: 81
End: 2021-09-26

## 2021-10-05 ENCOUNTER — IMMUNIZATION (OUTPATIENT)
Dept: FAMILY MEDICINE | Facility: CLINIC | Age: 81
End: 2021-10-05
Payer: MEDICARE

## 2021-10-05 DIAGNOSIS — Z23 NEED FOR PROPHYLACTIC VACCINATION AND INOCULATION AGAINST INFLUENZA: Primary | ICD-10-CM

## 2021-10-05 PROCEDURE — G0008 ADMIN INFLUENZA VIRUS VAC: HCPCS

## 2021-10-05 PROCEDURE — 99207 PR NO CHARGE NURSE ONLY: CPT

## 2021-10-05 PROCEDURE — 90662 IIV NO PRSV INCREASED AG IM: CPT

## 2021-10-05 NOTE — PROGRESS NOTES
Patient instructed to remain in clinic for 15 minutes afterwards, and to report any adverse reaction to me immediately.    Prior to injection verified patient identity using patient's name and date of birth.  Vaccine information supplied for influenza.  Gayle See MILADIS Corcoran

## 2021-12-29 DIAGNOSIS — Z00.00 ANNUAL PHYSICAL EXAM: Primary | ICD-10-CM

## 2022-01-26 ENCOUNTER — MYC MEDICAL ADVICE (OUTPATIENT)
Dept: CARDIOLOGY | Facility: CLINIC | Age: 82
End: 2022-01-26
Payer: MEDICARE

## 2022-02-02 ENCOUNTER — TELEPHONE (OUTPATIENT)
Dept: CARDIOLOGY | Facility: CLINIC | Age: 82
End: 2022-02-02

## 2022-02-02 DIAGNOSIS — I50.30 NYHA CLASS 3 HEART FAILURE WITH PRESERVED EJECTION FRACTION (H): ICD-10-CM

## 2022-02-02 DIAGNOSIS — E03.9 ACQUIRED HYPOTHYROIDISM: Primary | ICD-10-CM

## 2022-02-02 NOTE — TELEPHONE ENCOUNTER
[facility] Call Center    Phone Message    May a detailed message be left on voicemail: yes     Reason for Call: Other: Needs Echo rescheduled before Dr. Cornelius's appt 2.15.22.  Pt has Covid and had to cancel 2.3.22 Echo.  Please call pt to schedule for an Echo.      Action Taken: Message routed to:  [facility] ([facility] cardio    Travel Screening: Not Applicable

## 2022-02-02 NOTE — TELEPHONE ENCOUNTER
No openings in echo at [facility] prior to appt.     Pt is schd to be seen at [facility], will route to see if they can reschd echo at [facility] prior to sylvester appt at [facility]

## 2022-02-14 DIAGNOSIS — I48.0 PAROXYSMAL ATRIAL FIBRILLATION (H): ICD-10-CM

## 2022-02-14 NOTE — TELEPHONE ENCOUNTER
Routing refill request to provider for review/approval because:   Normal Platelets on file in past 12 months    Patient is 18-79 years of age    Serum creatinine less than or equal to 1.4 on file in past 12 mos    Weight is greater than 60 kg for the past year         Lab Results   Component Value Date    WBC 6.4 01/06/2021     Lab Results   Component Value Date    RBC 3.99 01/06/2021     Lab Results   Component Value Date    HGB 13.4 01/06/2021     Lab Results   Component Value Date    HCT 40.5 01/06/2021     Lab Results   Component Value Date     01/06/2021     Lab Results   Component Value Date    MCH 33.6 01/06/2021     Lab Results   Component Value Date    MCHC 33.1 01/06/2021     Lab Results   Component Value Date    RDW 13.1 01/06/2021     Lab Results   Component Value Date     01/06/2021      Creatinine   Date Value Ref Range Status   01/06/2021 1.33 (H) 0.66 - 1.25 mg/dL Final        Pending Prescriptions:                       Disp   Refills    apixaban ANTICOAGULANT (ELIQUIS ANTICOAGUL*180 ta*3        Sig: Take 1 tablet (5 mg) by mouth 2 times daily        Real Jeffries RN

## 2022-02-17 ENCOUNTER — LAB (OUTPATIENT)
Dept: LAB | Facility: CLINIC | Age: 82
End: 2022-02-17
Payer: MEDICARE

## 2022-02-17 DIAGNOSIS — Z80.42 FAMILY HISTORY OF PROSTATE CANCER: ICD-10-CM

## 2022-02-17 DIAGNOSIS — I50.30 NYHA CLASS 3 HEART FAILURE WITH PRESERVED EJECTION FRACTION (H): ICD-10-CM

## 2022-02-17 DIAGNOSIS — Z00.00 ENCOUNTER FOR MEDICARE ANNUAL WELLNESS EXAM: ICD-10-CM

## 2022-02-17 DIAGNOSIS — Z12.5 SCREENING FOR PROSTATE CANCER: ICD-10-CM

## 2022-02-17 DIAGNOSIS — Z00.00 ANNUAL PHYSICAL EXAM: ICD-10-CM

## 2022-02-17 DIAGNOSIS — E03.9 ACQUIRED HYPOTHYROIDISM: ICD-10-CM

## 2022-02-17 LAB
ERYTHROCYTE [DISTWIDTH] IN BLOOD BY AUTOMATED COUNT: 13.1 % (ref 10–15)
HCT VFR BLD AUTO: 39.9 % (ref 40–53)
HGB BLD-MCNC: 13.2 G/DL (ref 13.3–17.7)
MCH RBC QN AUTO: 33.5 PG (ref 26.5–33)
MCHC RBC AUTO-ENTMCNC: 33.1 G/DL (ref 31.5–36.5)
MCV RBC AUTO: 101 FL (ref 78–100)
NT-PROBNP SERPL-MCNC: 1920 PG/ML (ref 0–450)
PLATELET # BLD AUTO: 161 10E3/UL (ref 150–450)
RBC # BLD AUTO: 3.94 10E6/UL (ref 4.4–5.9)
WBC # BLD AUTO: 6.8 10E3/UL (ref 4–11)

## 2022-02-17 PROCEDURE — 80061 LIPID PANEL: CPT

## 2022-02-17 PROCEDURE — 80053 COMPREHEN METABOLIC PANEL: CPT

## 2022-02-17 PROCEDURE — 83880 ASSAY OF NATRIURETIC PEPTIDE: CPT

## 2022-02-17 PROCEDURE — 82043 UR ALBUMIN QUANTITATIVE: CPT

## 2022-02-17 PROCEDURE — 36415 COLL VENOUS BLD VENIPUNCTURE: CPT

## 2022-02-17 PROCEDURE — 84443 ASSAY THYROID STIM HORMONE: CPT

## 2022-02-17 PROCEDURE — G0103 PSA SCREENING: HCPCS

## 2022-02-17 PROCEDURE — 85027 COMPLETE CBC AUTOMATED: CPT

## 2022-02-18 LAB
ALBUMIN SERPL-MCNC: 3.2 G/DL (ref 3.4–5)
ALP SERPL-CCNC: 70 U/L (ref 40–150)
ALT SERPL W P-5'-P-CCNC: 28 U/L (ref 0–70)
ANION GAP SERPL CALCULATED.3IONS-SCNC: 7 MMOL/L (ref 3–14)
AST SERPL W P-5'-P-CCNC: 22 U/L (ref 0–45)
BILIRUB SERPL-MCNC: 0.8 MG/DL (ref 0.2–1.3)
BUN SERPL-MCNC: 15 MG/DL (ref 7–30)
CALCIUM SERPL-MCNC: 9 MG/DL (ref 8.5–10.1)
CHLORIDE BLD-SCNC: 108 MMOL/L (ref 94–109)
CHOLEST SERPL-MCNC: 111 MG/DL
CO2 SERPL-SCNC: 22 MMOL/L (ref 20–32)
CREAT SERPL-MCNC: 1.35 MG/DL (ref 0.66–1.25)
CREAT UR-MCNC: 103 MG/DL
FASTING STATUS PATIENT QL REPORTED: YES
GFR SERPL CREATININE-BSD FRML MDRD: 52 ML/MIN/1.73M2
GLUCOSE BLD-MCNC: 88 MG/DL (ref 70–99)
HDLC SERPL-MCNC: 50 MG/DL
LDLC SERPL CALC-MCNC: 48 MG/DL
MICROALBUMIN UR-MCNC: 6 MG/L
MICROALBUMIN/CREAT UR: 5.83 MG/G CR (ref 0–17)
NONHDLC SERPL-MCNC: 61 MG/DL
POTASSIUM BLD-SCNC: 4.4 MMOL/L (ref 3.4–5.3)
PROT SERPL-MCNC: 7.2 G/DL (ref 6.8–8.8)
PSA SERPL-MCNC: 2.62 UG/L (ref 0–4)
SODIUM SERPL-SCNC: 137 MMOL/L (ref 133–144)
TRIGL SERPL-MCNC: 64 MG/DL
TSH SERPL DL<=0.005 MIU/L-ACNC: 0.47 MU/L (ref 0.4–4)

## 2022-02-18 ASSESSMENT — ENCOUNTER SYMPTOMS
PARESTHESIAS: 0
HEMATURIA: 0
NAUSEA: 0
DIZZINESS: 0
JOINT SWELLING: 0
ABDOMINAL PAIN: 0
SHORTNESS OF BREATH: 0
CONSTIPATION: 0
CHILLS: 0
MYALGIAS: 0
DYSURIA: 0
EYE PAIN: 0
PALPITATIONS: 0
HEMATOCHEZIA: 0
HEARTBURN: 0
COUGH: 1
WEAKNESS: 0
SORE THROAT: 0
NERVOUS/ANXIOUS: 0
HEADACHES: 0
FREQUENCY: 0
DIARRHEA: 0
ARTHRALGIAS: 0
FEVER: 0

## 2022-02-18 ASSESSMENT — ACTIVITIES OF DAILY LIVING (ADL): CURRENT_FUNCTION: NO ASSISTANCE NEEDED

## 2022-02-24 ENCOUNTER — OFFICE VISIT (OUTPATIENT)
Dept: FAMILY MEDICINE | Facility: CLINIC | Age: 82
End: 2022-02-24
Payer: MEDICARE

## 2022-02-24 VITALS
WEIGHT: 250 LBS | HEIGHT: 69 IN | TEMPERATURE: 97.4 F | SYSTOLIC BLOOD PRESSURE: 110 MMHG | BODY MASS INDEX: 37.03 KG/M2 | RESPIRATION RATE: 18 BRPM | OXYGEN SATURATION: 98 % | DIASTOLIC BLOOD PRESSURE: 72 MMHG | HEART RATE: 75 BPM

## 2022-02-24 DIAGNOSIS — I48.0 PAROXYSMAL ATRIAL FIBRILLATION (H): ICD-10-CM

## 2022-02-24 DIAGNOSIS — I50.30 NYHA CLASS 3 HEART FAILURE WITH PRESERVED EJECTION FRACTION (H): ICD-10-CM

## 2022-02-24 DIAGNOSIS — H35.3211 EXUDATIVE AGE-RELATED MACULAR DEGENERATION, RIGHT EYE, WITH ACTIVE CHOROIDAL NEOVASCULARIZATION (H): ICD-10-CM

## 2022-02-24 DIAGNOSIS — E66.01 MORBID OBESITY (H): ICD-10-CM

## 2022-02-24 DIAGNOSIS — N18.31 STAGE 3A CHRONIC KIDNEY DISEASE (H): ICD-10-CM

## 2022-02-24 DIAGNOSIS — E78.5 HYPERLIPIDEMIA LDL GOAL <100: ICD-10-CM

## 2022-02-24 DIAGNOSIS — I10 HYPERTENSION GOAL BP (BLOOD PRESSURE) < 140/90: ICD-10-CM

## 2022-02-24 DIAGNOSIS — Z00.00 ENCOUNTER FOR MEDICARE ANNUAL WELLNESS EXAM: Primary | ICD-10-CM

## 2022-02-24 PROCEDURE — G0439 PPPS, SUBSEQ VISIT: HCPCS | Performed by: FAMILY MEDICINE

## 2022-02-24 RX ORDER — LOVASTATIN 40 MG
40 TABLET ORAL AT BEDTIME
Qty: 90 TABLET | Refills: 3 | Status: SHIPPED | OUTPATIENT
Start: 2022-02-24 | End: 2023-02-13

## 2022-02-24 RX ORDER — LOSARTAN POTASSIUM 100 MG/1
100 TABLET ORAL DAILY
Qty: 90 TABLET | Refills: 3 | Status: SHIPPED | OUTPATIENT
Start: 2022-02-24 | End: 2023-02-13

## 2022-02-24 ASSESSMENT — ENCOUNTER SYMPTOMS
NAUSEA: 0
HEMATOCHEZIA: 0
DIZZINESS: 0
CONSTIPATION: 0
ARTHRALGIAS: 0
PALPITATIONS: 0
ENDOCRINE NEGATIVE: 1
JOINT SWELLING: 0
EYE PAIN: 0
HEARTBURN: 0
FEVER: 0
DIARRHEA: 0
ALLERGIC/IMMUNOLOGIC NEGATIVE: 1
SHORTNESS OF BREATH: 0
DYSURIA: 0
CHILLS: 0
SORE THROAT: 0
HEMATURIA: 0
NERVOUS/ANXIOUS: 0
FREQUENCY: 0
COUGH: 1
ABDOMINAL PAIN: 0
MYALGIAS: 0
PARESTHESIAS: 0
HEMATOLOGIC/LYMPHATIC NEGATIVE: 1
WEAKNESS: 0
HEADACHES: 0

## 2022-02-24 ASSESSMENT — PAIN SCALES - GENERAL: PAINLEVEL: NO PAIN (0)

## 2022-02-24 ASSESSMENT — ACTIVITIES OF DAILY LIVING (ADL): CURRENT_FUNCTION: NO ASSISTANCE NEEDED

## 2022-02-24 NOTE — PROGRESS NOTES
"SUBJECTIVE:   Sami Harmon is a 81 year old male who presents for Preventive Visit.  Comes for medicare exam, doing well, reviewed lab results with him.  History of Paroxysmal A. Fib, follows with Cardiology.    Patient has been advised of split billing requirements and indicates understanding: Yes  Are you in the first 12 months of your Medicare coverage?  No    Healthy Habits:     In general, how would you rate your overall health?  Good    Frequency of exercise:  4-5 days/week    Duration of exercise:  15-30 minutes    Do you usually eat at least 4 servings of fruit and vegetables a day, include whole grains    & fiber and avoid regularly eating high fat or \"junk\" foods?  Yes    Taking medications regularly:  Yes    Barriers to taking medications:  None    Ability to successfully perform activities of daily living:  No assistance needed    Home Safety:  No safety concerns identified    Hearing Impairment:  Difficulty following a conversation in a noisy restaurant or crowded room, feel that people are mumbling or not speaking clearly, need to ask people to speak up or repeat themselves, find that men's voices are easier to understand than woman's and difficulty understanding soft or whispered speech    In the past 6 months, have you been bothered by leaking of urine?  No    In general, how would you rate your overall mental or emotional health?  Excellent      PHQ-2 Total Score: 0    Additional concerns today:  Yes    Do you feel safe in your environment? No    Have you ever done Advance Care Planning? (For example, a Health Directive, POLST, or a discussion with a medical provider or your loved ones about your wishes): Yes, advance care planning is on file.       Fall risk  Fallen 2 or more times in the past year?: No  Any fall with injury in the past year?: No    Cognitive Screening   1) Repeat 3 items (Leader, Season, Table)    2) Clock draw: NORMAL  3) 3 item recall: Recalls 3 objects  Results: 3 items " recalled: COGNITIVE IMPAIRMENT LESS LIKELY    Mini-CogTM Copyright ISA Paniagua. Licensed by the author for use in [facility]; reprinted with permission (janeth@[facility]). All rights reserved.      Do you have sleep apnea, excessive snoring or daytime drowsiness?: no    Reviewed and updated as needed this visit by clinical staff   Tobacco  Allergies  Meds   Med Hx  Surg Hx  Fam Hx  Soc Hx        Reviewed and updated as needed this visit by Provider                 Social History     Tobacco Use     Smoking status: Never Smoker     Smokeless tobacco: Former User     Types: Chew     Tobacco comment: Chewed tobacco for 30 years   Substance Use Topics     Alcohol use: Yes     Comment: 1 or less drink per day     If you drink alcohol do you typically have >3 drinks per day or >7 drinks per week? No    Alcohol Use 2/18/2022   Prescreen: >3 drinks/day or >7 drinks/week? No   Prescreen: >3 drinks/day or >7 drinks/week? -   No flowsheet data found.          Current providers sharing in care for this patient include:   Patient Care Team:  Mona Real, RN as Specialty Care Coordinator (Cardiology)  Huy Ma MD as Assigned PCP  Piyush Lopez RN as Specialty Care Coordinator (Cardiology)    The following health maintenance items are reviewed in Epic and correct as of today:  Health Maintenance Due   Topic Date Due     ANNUAL REVIEW OF HM ORDERS  Never done     MEDICARE ANNUAL WELLNESS VISIT  01/12/2022     FALL RISK ASSESSMENT  01/12/2022     Labs reviewed in EPIC  BP Readings from Last 3 Encounters:   02/24/22 110/72   01/12/21 110/84   02/19/20 118/76    Wt Readings from Last 3 Encounters:   02/24/22 113.4 kg (250 lb)   01/12/21 114.8 kg (253 lb)   02/19/20 114.8 kg (253 lb)                  Patient Active Problem List   Diagnosis     Family history of prostate cancer     Hyperlipidemia LDL goal <100     CKD (chronic kidney disease) stage 3, GFR 30-59 ml/min (H)     Advance care planning     Primary  localized osteoarthrosis, pelvic region and thigh     History of colon polyps     Advanced directives, counseling/discussion     Hypertension goal BP (blood pressure) < 140/90     Overweight     Paroxysmal atrial fibrillation (H)     Acquired hypothyroidism     Obesity (BMI 30-39.9)     Chorioretinal scar     Morbid obesity (H)     Exudative age-related macular degeneration, right eye, with active choroidal neovascularization (H)     NYHA class 3 heart failure with preserved ejection fraction (H)     Past Surgical History:   Procedure Laterality Date     ABDOMEN SURGERY  1970's, 1980's    Two hernia surgerys     BIOPSY  July 2016    polyp removed from vocal cord - benign     COLONOSCOPY       COSMETIC SURGERY       EYE SURGERY  2000    Macular scar, presently being treated with avastin     HERNIA REPAIR, INGUINAL RT/LT  1970,1980     ORTHOPEDIC SURGERY  April 2015, June 2015    Right hip replacement, Left hip replacement     SURGICAL HISTORY OF -   2000    cholesteatoma removal L     SURGICAL HISTORY OF -   3/2004    laser macular     SURGICAL HISTORY OF -   4-2015    right hip replacement     TONSILLECTOMY & ADENOIDECTOMY  1961       Social History     Tobacco Use     Smoking status: Never Smoker     Smokeless tobacco: Former User     Types: Chew     Tobacco comment: Chewed tobacco for 30 years   Substance Use Topics     Alcohol use: Yes     Comment: 1 or less drink per day     Family History   Problem Relation Age of Onset     Arthritis Mother      Eye Disorder Mother      Diabetes Mother         type 2     Hypertension Mother      Cancer - colorectal Mother      Cerebrovascular Disease Mother      Osteoporosis Mother      Obesity Mother      Colon Cancer Mother      Heart Disease Father      Prostate Cancer Father      Cerebrovascular Disease Father      Heart Disease Brother      Prostate Cancer Brother      Coronary Artery Disease Brother      Gastrointestinal Disease Son      Prostate Cancer Brother           Current Outpatient Medications   Medication Sig Dispense Refill     apixaban ANTICOAGULANT (ELIQUIS ANTICOAGULANT) 5 MG tablet Take 1 tablet (5 mg) by mouth 2 times daily 180 tablet 3     Ferrous Sulfate (IRON SUPPLEMENT PO) Take by mouth daily        latanoprost (XALATAN) 0.005 % ophthalmic solution Place 1 drop into both eyes daily       levothyroxine (SYNTHROID/LEVOTHROID) 200 MCG tablet Take 1 tablet (200 mcg) by mouth daily 90 tablet 3     losartan (COZAAR) 100 MG tablet Take 1 tablet (100 mg) by mouth daily 90 tablet 3     lovastatin (MEVACOR) 40 MG tablet Take 1 tablet (40 mg) by mouth At Bedtime 90 tablet 3     metoprolol succinate ER (TOPROL XL) 100 MG 24 hr tablet Take 1.5 tablets (150 mg) by mouth daily 135 tablet 3     MULTI-VITAMIN OR 1 tablets  daily       Psyllium (METAMUCIL PO) Take by mouth daily 1 teaspoon added to 1 glass of water once daily       Review of Systems   Constitutional: Negative for chills and fever.   HENT: Positive for hearing loss. Negative for congestion, ear pain and sore throat.    Eyes: Negative for pain and visual disturbance.   Respiratory: Positive for cough. Negative for shortness of breath.    Cardiovascular: Negative for chest pain, palpitations and peripheral edema.   Gastrointestinal: Negative for abdominal pain, constipation, diarrhea, heartburn, hematochezia and nausea.   Endocrine: Negative.    Genitourinary: Negative for dysuria, frequency, genital sores, hematuria, impotence, penile discharge and urgency.   Musculoskeletal: Negative for arthralgias, joint swelling and myalgias.   Skin: Negative for rash.   Allergic/Immunologic: Negative.    Neurological: Negative for dizziness, weakness, headaches and paresthesias.   Hematological: Negative.    Psychiatric/Behavioral: Negative for mood changes. The patient is not nervous/anxious.      Constitutional, HEENT, cardiovascular, pulmonary, GI, , musculoskeletal, neuro, skin, endocrine and psych systems are  "negative, except as otherwise noted.    OBJECTIVE:   /72 (BP Location: Right arm, Patient Position: Chair, Cuff Size: Adult Large)   Pulse 75   Temp 97.4  F (36.3  C) (Tympanic)   Resp 18   Ht 1.765 m (5' 9.49\")   Wt 113.4 kg (250 lb)   SpO2 98%   BMI 36.40 kg/m   Estimated body mass index is 36.4 kg/m  as calculated from the following:    Height as of this encounter: 1.765 m (5' 9.49\").    Weight as of this encounter: 113.4 kg (250 lb).  Physical Exam  GENERAL: healthy, alert and no distress  HENT: ear canals and TM's normal, nose and mouth without ulcers or lesions  NECK: no adenopathy, no asymmetry, masses, or scars and thyroid normal to palpation  RESP: lungs clear to auscultation - no rales, rhonchi or wheezes  CV: regular rate and rhythm, normal S1 S2, no S3 or S4, no murmur, click or rub, no peripheral edema and peripheral pulses strong  ABDOMEN: soft, nontender, no hepatosplenomegaly, no masses and bowel sounds normal  MS: no gross musculoskeletal defects noted, no edema  SKIN: no suspicious lesions or rashes  NEURO: Normal strength and tone, mentation intact and speech normal  PSYCH: mentation appears normal, affect normal/bright    Diagnostic Test Results:  Labs reviewed in [facility]  Last Comprehensive Metabolic Panel:  Sodium   Date Value Ref Range Status   02/17/2022 137 133 - 144 mmol/L Final   01/06/2021 139 133 - 144 mmol/L Final     Potassium   Date Value Ref Range Status   02/17/2022 4.4 3.4 - 5.3 mmol/L Final   01/06/2021 4.6 3.4 - 5.3 mmol/L Final     Chloride   Date Value Ref Range Status   02/17/2022 108 94 - 109 mmol/L Final   01/06/2021 108 94 - 109 mmol/L Final     Carbon Dioxide   Date Value Ref Range Status   01/06/2021 27 20 - 32 mmol/L Final     Carbon Dioxide (CO2)   Date Value Ref Range Status   02/17/2022 22 20 - 32 mmol/L Final     Anion Gap   Date Value Ref Range Status   02/17/2022 7 3 - 14 mmol/L Final   01/06/2021 4 3 - 14 mmol/L Final     Glucose   Date Value Ref Range " Status   02/17/2022 88 70 - 99 mg/dL Final   01/06/2021 88 70 - 99 mg/dL Final     Comment:     Fasting specimen     Urea Nitrogen   Date Value Ref Range Status   02/17/2022 15 7 - 30 mg/dL Final   01/06/2021 18 7 - 30 mg/dL Final     Creatinine   Date Value Ref Range Status   02/17/2022 1.35 (H) 0.66 - 1.25 mg/dL Final   01/06/2021 1.33 (H) 0.66 - 1.25 mg/dL Final     GFR Estimate   Date Value Ref Range Status   02/17/2022 52 (L) >60 mL/min/1.73m2 Final     Comment:     Effective December 21, 2021 eGFRcr in adults is calculated using the 2021 CKD-EPI creatinine equation which includes age and gender (Tamara et al., NEJ, DOI: 10.1056/YELIqw5108574)   01/06/2021 49 (L) >60 mL/min/[1.73_m2] Final     Comment:     Non  GFR Calc  Starting 12/18/2018, serum creatinine based estimated GFR (eGFR) will be   calculated using the Chronic Kidney Disease Epidemiology Collaboration   (CKD-EPI) equation.       Calcium   Date Value Ref Range Status   02/17/2022 9.0 8.5 - 10.1 mg/dL Final   01/06/2021 9.0 8.5 - 10.1 mg/dL Final     Bilirubin Total   Date Value Ref Range Status   02/17/2022 0.8 0.2 - 1.3 mg/dL Final   01/06/2021 0.9 0.2 - 1.3 mg/dL Final     Alkaline Phosphatase   Date Value Ref Range Status   02/17/2022 70 40 - 150 U/L Final   01/06/2021 78 40 - 150 U/L Final     ALT   Date Value Ref Range Status   02/17/2022 28 0 - 70 U/L Final   01/06/2021 36 0 - 70 U/L Final     AST   Date Value Ref Range Status   02/17/2022 22 0 - 45 U/L Final   01/06/2021 26 0 - 45 U/L Final     PSA   Date Value Ref Range Status   06/27/2019 0.96 0 - 4 ug/L Final     Comment:     Assay Method:  Chemiluminescence using Siemens Vista analyzer     Prostate Specific Antigen Screen   Date Value Ref Range Status   02/17/2022 2.62 0.00 - 4.00 ug/L Final     Lab Results   Component Value Date    CHOL 111 02/17/2022    CHOL 130 01/06/2021     Lab Results   Component Value Date    HDL 50 02/17/2022    HDL 52 01/06/2021     Lab Results  "  Component Value Date    LDL 48 02/17/2022    LDL 67 01/06/2021     Lab Results   Component Value Date    TRIG 64 02/17/2022    TRIG 57 01/06/2021     Lab Results   Component Value Date    CHOLHDLRATIO 2.1 04/01/2015       ASSESSMENT / PLAN:   (Z00.00) Encounter for Medicare annual wellness exam  (primary encounter diagnosis)  Comment: normal exam  Plan: routine preventative care discussed,  Discussed diet and weight loss, increase physical activities    Discussed Risks fall precautions  One year follow up recommended for Medicare annaul exam.    (I10) Hypertension goal BP (blood pressure) < 140/90  Comment:   Plan: losartan (COZAAR) 100 MG tablet        Stable, continue with current medications    (E66.01) Morbid obesity (H)  Comment:   Plan: discussed diet and weight loss    (E78.5) Hyperlipidemia LDL goal <100  Comment:   Plan: lovastatin (MEVACOR) 40 MG tablet        Reviewed lab  Results with pt    (H35.0401) Exudative age-related macular degeneration, right eye, with active choroidal neovascularization (H)  Comment:   Plan: stable    (I50.30) NYHA class 3 heart failure with preserved ejection fraction (H)  Comment:   Plan: no symptoms  Follow with Cardiology    (I48.0) Paroxysmal atrial fibrillation (H)  Comment:   Plan: stable, no symptoms.  Continue on Eliquis and Metoprolol  Follow up with Cardiology.    (N18.31) Stage 3a chronic kidney disease (H)  Comment:   Plan: stable, last BMP, stable functions.    Patient has been advised of split billing requirements and indicates understanding: Yes    COUNSELING:  Reviewed preventive health counseling, as reflected in patient instructions       Regular exercise       Healthy diet/nutrition       Vision screening       Fall risk prevention       Prostate cancer screening    Estimated body mass index is 36.4 kg/m  as calculated from the following:    Height as of this encounter: 1.765 m (5' 9.49\").    Weight as of this encounter: 113.4 kg (250 lb).    Weight " management plan: Discussed healthy diet and exercise guidelines    He reports that he has never smoked. He quit smokeless tobacco use about 21 years ago.  His smokeless tobacco use included chew.      Appropriate preventive services were discussed with this patient, including applicable screening as appropriate for cardiovascular disease, diabetes, osteopenia/osteoporosis, and glaucoma.  As appropriate for age/gender, discussed screening for colorectal cancer, prostate cancer, breast cancer, and cervical cancer. Checklist reviewing preventive services available has been given to the patient.    Reviewed patients plan of care and provided an AVS. The Basic Care Plan (routine screening as documented in Health Maintenance) for Sami meets the Care Plan requirement. This Care Plan has been established and reviewed with the Patient.    Counseling Resources:  ATP IV Guidelines  Pooled Cohorts Equation Calculator  Breast Cancer Risk Calculator  Breast Cancer: Medication to Reduce Risk  FRAX Risk Assessment  ICSI Preventive Guidelines  Dietary Guidelines for Americans, 2010  USDA's MyPlate  ASA Prophylaxis  Lung CA Screening    Huy Ma MD  [facility]    Identified Health Risks:

## 2022-02-24 NOTE — PATIENT INSTRUCTIONS
Patient Education   Personalized Prevention Plan  You are due for the preventive services outlined below.  Your care team is available to assist you in scheduling these services.  If you have already completed any of these items, please share that information with your care team to update in your medical record.  Health Maintenance Due   Topic Date Due     ANNUAL REVIEW OF HM ORDERS  Never done     Annual Wellness Visit  01/12/2022     FALL RISK ASSESSMENT  01/12/2022

## 2022-02-25 ENCOUNTER — ANCILLARY PROCEDURE (OUTPATIENT)
Dept: CARDIOLOGY | Facility: CLINIC | Age: 82
End: 2022-02-25
Attending: INTERNAL MEDICINE
Payer: MEDICARE

## 2022-02-25 DIAGNOSIS — I50.30 NYHA CLASS 3 HEART FAILURE WITH PRESERVED EJECTION FRACTION (H): ICD-10-CM

## 2022-02-25 LAB — LVEF ECHO: NORMAL

## 2022-02-25 PROCEDURE — 93306 TTE W/DOPPLER COMPLETE: CPT | Performed by: INTERNAL MEDICINE

## 2022-03-17 NOTE — PROGRESS NOTES
March 18, 2022     Faustino Resendiz is an 80 yo gentleman with minimal past medical history including HTN, HL seen by primary care provider and then by myself for atrial fibrillation. He was checking his blood pressure at home and he did notice that his blood pressure cuff shows irregular heart rate.  This prompted him asking his primary care provider about the regular heart rate and disease how his atrial fibrillation was discovered again he is completely symptomatic. He then came to cardiology clinic, was in sinus rhythm. Was started on low dose metoprolol and Ziopatch has been placed as results as below confirming paroxysmal atrial fibrillation, rate controlled with a burden of 62%. Today he returns to follow up.  Overall has been doing very well without any complaints.  Minimal symptoms with certain positions overnight but otherwise no issues on quality of life.  Takes all medications no bleeding episodes.  No palpitations during the daytime.  Feeling well.  Blood pressure usually runs in the 110 mmHg at home today first of 1 off.  No other issues    PAST MEDICAL HISTORY:  Past Medical History:   Diagnosis Date     Acquired hypothyroidism 12/31/2018     Arthritis 2010     Essential hypertension, benign 2004     Macular degeneration 1980s     Paroxysmal atrial fibrillation (H) 12/31/2018     Pure hypercholesterolemia 2004    goal ldl <100     FAMILY HISTORY:  Family History   Problem Relation Age of Onset     Arthritis Mother      Eye Disorder Mother      Diabetes Mother         type 2     Hypertension Mother      Cancer - colorectal Mother      Cerebrovascular Disease Mother      Osteoporosis Mother      Obesity Mother      Colon Cancer Mother      Heart Disease Father      Prostate Cancer Father      Cerebrovascular Disease Father      Heart Disease Brother      Prostate Cancer Brother      Coronary Artery Disease Brother      Gastrointestinal Disease Son      Prostate Cancer Brother      SOCIAL HISTORY:  Social  History     Socioeconomic History     Marital status:      Spouse name: Meredith     Number of children: 4     Years of education: Not on file     Highest education level: Not on file   Occupational History     Occupation:      Employer: RETIRED   Tobacco Use     Smoking status: Never Smoker     Smokeless tobacco: Former User     Types: Chew     Tobacco comment: Chewed tobacco for 30 years   Vaping Use     Vaping Use: Never used   Substance and Sexual Activity     Alcohol use: Yes     Comment: 1 or less drink per day     Drug use: No     Sexual activity: Not Currently     Partners: Female   Other Topics Concern     Parent/sibling w/ CABG, MI or angioplasty before 65F 55M? No      Service No     Blood Transfusions No     Caffeine Concern No     Occupational Exposure No     Hobby Hazards No     Sleep Concern No     Stress Concern No     Weight Concern No     Special Diet No     Back Care No     Exercise No     Bike Helmet No     Seat Belt Yes     Self-Exams Yes   Social History Narrative     Not on file     Social Determinants of Health     Financial Resource Strain: Not on file   Food Insecurity: Not on file   Transportation Needs: Not on file   Physical Activity: Not on file   Stress: Not on file   Social Connections: Not on file   Intimate Partner Violence: Not on file   Housing Stability: Not on file     CURRENT MEDICATIONS:  Current Outpatient Medications   Medication     apixaban ANTICOAGULANT (ELIQUIS ANTICOAGULANT) 5 MG tablet     Ferrous Sulfate (IRON SUPPLEMENT PO)     latanoprost (XALATAN) 0.005 % ophthalmic solution     levothyroxine (SYNTHROID/LEVOTHROID) 200 MCG tablet     losartan (COZAAR) 100 MG tablet     lovastatin (MEVACOR) 40 MG tablet     metoprolol succinate ER (TOPROL XL) 100 MG 24 hr tablet     MULTI-VITAMIN OR     Psyllium (METAMUCIL PO)     No current facility-administered medications for this visit.     ROS:   Constitutional: No fever, chills, or sweats.   ENT: No  visual disturbance, ear ache, epistaxis, sore throat.   Allergies/Immunologic: Negative.   Respiratory: No cough, hemoptysis.   Cardiovascular: As per HPI.   GI: No nausea, vomiting, hematemesis, melena, or hematochezia.   : No urinary frequency, dysuria, or hematuria.   Integrument: Negative.   Psychiatric: No evidence of major depression  Neuro: No new neurological complaints at this time. Non focal  Endocrinology: Negative.   Musculoskeletal: As per HPI.      EXAM:  BP (!) 148/82 (BP Location: Right arm, Patient Position: Chair, Cuff Size: Adult Large)   Pulse 53   Wt 117.9 kg (260 lb)   SpO2 98%   BMI 37.86 kg/m    General: appears comfortable, alert and oriented  Head: normocephalic, atraumatic  Eyes: anicteric sclera, EOMI , PERRL  Neck: no adenopathy  Orophyarynx: moist mucosa, no lesions noted  Heart: regular, S1/S2, no murmurs, rubs or gallop. Estimated JVP at 5 cmH2O  Lungs: CTAB, No wheezing.   Abdomen: soft, non-tender, bowel sounds present, no hepatosplenomegaly  Extremities: No LE edema today  Skin: no open lesions noted  Neuro: grossly non-focal  Psych: no evidence of depression noted     Labs:  Lab Results   Component Value Date    WBC 6.8 02/17/2022    HGB 13.2 (L) 02/17/2022    HCT 39.9 (L) 02/17/2022     02/17/2022     02/17/2022    POTASSIUM 4.4 02/17/2022    CHLORIDE 108 02/17/2022    CO2 22 02/17/2022    BUN 15 02/17/2022    CR 1.35 (H) 02/17/2022    GLC 88 02/17/2022    NTBNP 1,920 (H) 02/17/2022    AST 22 02/17/2022    ALT 28 02/17/2022    ALKPHOS 70 02/17/2022    BILITOTAL 0.8 02/17/2022    INR 1.0 06/04/2015     Echocardiogram reviewed 12/2018:  Global and regional left ventricular function is normal with an EF of 55-60%.  The right ventricle is normal size. Global right ventricular function is  Normal. Mild aortic valve sclerosis is present. The proximal Asc Aorta measures 4.2cm in diameter. The aortic root index is 1.8cm/m2 (Normal index for males is 1.4-1.7cm/m2) No  pericardial effusion is present.     Ziopatch 3/2019:   62% a-fib burden, mostly rate controlled.     TTE 2/25/22  Technically difficult study.Extremely poor acoustic windows.  Global and regional left ventricular function is normal with an EF of 55-60%.  Diastolic function not assessed due to atrial fibrillation.  Right ventricular function, chamber size, wall motion, and thickness are normal.  Pulmonary artery systolic pressure is normal.  Sinuses of Valsalva 4.3 cm.  Ascending aorta 4.3 cm.  The inferior vena cava cannot be assessed. No pericardial effusion is present.  No significant changes noted.     ASSESSMENT AND PLAN:  In summary, patient is a 81 year old history of hypertension hyperlipidemia and paroxysmal atrial fibrillation on apixaban who presented to the cardiology clinic to follow up.    Patient remains in atrial fibrillation however he is heart rate is well controlled.  His blood pressure is also pretty well controlled at this time.  He has no complications from apixaban use tolerated well.  Overall feels well.  Laboratory including CBC CMP reviewed in person.  Also reviewed most recent echocardiogram.  Overall he continues to do very well without any complaints.  He does not have any palpitations except for at night where he infrequently in certain positions.  Otherwise he is atrial fibrillation is rate controlled and he does not have any symptoms associated with it.  If he is in normal having any effect on the quality of life.  He is doing well no medication changes are needed today.  Reviewed renew his levothyroxine as well as his metoprolol which he asked for.  Otherwise no issues  We will see him back with labs and echocardiogram in a year unless there is any changes in his clinical condition.    Follow up:   1 year with labs and echo     I appreciate the opportunity to participate in the care of Sami Harmon . Please do not hesitate to contact me with any further questions.     Sincerely,    Benji Cornelius MD     [facility] Division of Cardiology

## 2022-03-18 ENCOUNTER — OFFICE VISIT (OUTPATIENT)
Dept: CARDIOLOGY | Facility: CLINIC | Age: 82
End: 2022-03-18
Attending: INTERNAL MEDICINE
Payer: MEDICARE

## 2022-03-18 VITALS
OXYGEN SATURATION: 98 % | SYSTOLIC BLOOD PRESSURE: 148 MMHG | HEART RATE: 53 BPM | BODY MASS INDEX: 37.86 KG/M2 | DIASTOLIC BLOOD PRESSURE: 82 MMHG | WEIGHT: 260 LBS

## 2022-03-18 DIAGNOSIS — E78.2 MIXED HYPERLIPIDEMIA: ICD-10-CM

## 2022-03-18 DIAGNOSIS — I10 BENIGN ESSENTIAL HYPERTENSION: ICD-10-CM

## 2022-03-18 DIAGNOSIS — E78.5 HYPERLIPIDEMIA LDL GOAL <100: Primary | ICD-10-CM

## 2022-03-18 DIAGNOSIS — I50.30 NYHA CLASS 3 HEART FAILURE WITH PRESERVED EJECTION FRACTION (H): ICD-10-CM

## 2022-03-18 DIAGNOSIS — E03.9 ACQUIRED HYPOTHYROIDISM: ICD-10-CM

## 2022-03-18 DIAGNOSIS — I48.19 PERSISTENT ATRIAL FIBRILLATION (H): ICD-10-CM

## 2022-03-18 PROCEDURE — 99214 OFFICE O/P EST MOD 30 MIN: CPT | Performed by: INTERNAL MEDICINE

## 2022-03-18 RX ORDER — LEVOTHYROXINE SODIUM 200 UG/1
200 TABLET ORAL DAILY
Qty: 90 TABLET | Refills: 3 | Status: SHIPPED | OUTPATIENT
Start: 2022-03-18 | End: 2023-03-01

## 2022-03-18 RX ORDER — METOPROLOL SUCCINATE 100 MG/1
150 TABLET, EXTENDED RELEASE ORAL DAILY
Qty: 135 TABLET | Refills: 3 | Status: SHIPPED | OUTPATIENT
Start: 2022-03-18 | End: 2023-03-03

## 2022-03-18 NOTE — PATIENT INSTRUCTIONS
Thank you for coming to the [facility] @ [place]; please note the following instructions:    1. Follow up in one year with an ECHO and labs        If you have any questions regarding your visit please contact your care team:     Cardiology  Telephone Number   Brigid EDDIEMj, RN  Mona DINERO, RN   Kasey MATIAS, RMREGINALD SOOD, AVEL SHEEHAN, LPN  Betina ZAPATA, Visit Facilitator   [phone] (option 1)   For scheduling appts:     [phone] (select option 1)       For the Device Clinic (Pacemakers and ICD's)  RN's :  Gali Proctor   During business hours: [phone]    *After business hours:  [phone] (select option 4)      Normal test result notifications will be released via Cojoin or mailed within 7 business days.  All other test results, will be communicated via telephone once reviewed by your cardiologist.    If you need a medication refill please contact your pharmacy.  Please allow 3 business days for your refill to be completed.    As always, thank you for trusting us with your health care needs!

## 2022-03-18 NOTE — NURSING NOTE
"Chief Complaint   Patient presents with     RECHECK     1 year follow up with labs and echo prior.        Initial BP (!) 148/82 (BP Location: Right arm, Patient Position: Chair, Cuff Size: Adult Large)   Pulse 53   Wt 117.9 kg (260 lb)   SpO2 98%   BMI 37.86 kg/m   Estimated body mass index is 37.86 kg/m  as calculated from the following:    Height as of 2/24/22: 1.765 m (5' 9.49\").    Weight as of this encounter: 117.9 kg (260 lb)..  BP completed using cuff size: AVEL Khan  "

## 2022-03-18 NOTE — LETTER
3/18/2022      RE: Sami Harmon  [address] [place] [id number]       Dear Colleague,    Thank you for the opportunity to participate in the care of your patient, Sami Harmon, at the [facility]. Please see a copy of my visit note below.    March 18, 2022     Faustino Resendiz is an 80 yo gentleman with minimal past medical history including HTN, HL seen by primary care provider and then by myself for atrial fibrillation. He was checking his blood pressure at home and he did notice that his blood pressure cuff shows irregular heart rate.  This prompted him asking his primary care provider about the regular heart rate and disease how his atrial fibrillation was discovered again he is completely symptomatic. He then came to cardiology clinic, was in sinus rhythm. Was started on low dose metoprolol and Ziopatch has been placed as results as below confirming paroxysmal atrial fibrillation, rate controlled with a burden of 62%. Today he returns to follow up.  Overall has been doing very well without any complaints.  Minimal symptoms with certain positions overnight but otherwise no issues on quality of life.  Takes all medications no bleeding episodes.  No palpitations during the daytime.  Feeling well.  Blood pressure usually runs in the 110 mmHg at home today first of 1 off.  No other issues    PAST MEDICAL HISTORY:  Past Medical History:   Diagnosis Date     Acquired hypothyroidism 12/31/2018     Arthritis 2010     Essential hypertension, benign 2004     Macular degeneration 1980s     Paroxysmal atrial fibrillation (H) 12/31/2018     Pure hypercholesterolemia 2004    goal ldl <100     FAMILY HISTORY:  Family History   Problem Relation Age of Onset     Arthritis Mother      Eye Disorder Mother      Diabetes Mother         type 2     Hypertension Mother      Cancer - colorectal Mother      Cerebrovascular Disease Mother       Osteoporosis Mother      Obesity Mother      Colon Cancer Mother      Heart Disease Father      Prostate Cancer Father      Cerebrovascular Disease Father      Heart Disease Brother      Prostate Cancer Brother      Coronary Artery Disease Brother      Gastrointestinal Disease Son      Prostate Cancer Brother      SOCIAL HISTORY:  Social History     Socioeconomic History     Marital status:      Spouse name: Meredith     Number of children: 4     Years of education: Not on file     Highest education level: Not on file   Occupational History     Occupation:      Employer: RETIRED   Tobacco Use     Smoking status: Never Smoker     Smokeless tobacco: Former User     Types: Chew     Tobacco comment: Chewed tobacco for 30 years   Vaping Use     Vaping Use: Never used   Substance and Sexual Activity     Alcohol use: Yes     Comment: 1 or less drink per day     Drug use: No     Sexual activity: Not Currently     Partners: Female   Other Topics Concern     Parent/sibling w/ CABG, MI or angioplasty before 65F 55M? No      Service No     Blood Transfusions No     Caffeine Concern No     Occupational Exposure No     Hobby Hazards No     Sleep Concern No     Stress Concern No     Weight Concern No     Special Diet No     Back Care No     Exercise No     Bike Helmet No     Seat Belt Yes     Self-Exams Yes   Social History Narrative     Not on file     Social Determinants of Health     Financial Resource Strain: Not on file   Food Insecurity: Not on file   Transportation Needs: Not on file   Physical Activity: Not on file   Stress: Not on file   Social Connections: Not on file   Intimate Partner Violence: Not on file   Housing Stability: Not on file     CURRENT MEDICATIONS:  Current Outpatient Medications   Medication     apixaban ANTICOAGULANT (ELIQUIS ANTICOAGULANT) 5 MG tablet     Ferrous Sulfate (IRON SUPPLEMENT PO)     latanoprost (XALATAN) 0.005 % ophthalmic solution     levothyroxine  (SYNTHROID/LEVOTHROID) 200 MCG tablet     losartan (COZAAR) 100 MG tablet     lovastatin (MEVACOR) 40 MG tablet     metoprolol succinate ER (TOPROL XL) 100 MG 24 hr tablet     MULTI-VITAMIN OR     Psyllium (METAMUCIL PO)     No current facility-administered medications for this visit.     ROS:   Constitutional: No fever, chills, or sweats.   ENT: No visual disturbance, ear ache, epistaxis, sore throat.   Allergies/Immunologic: Negative.   Respiratory: No cough, hemoptysis.   Cardiovascular: As per HPI.   GI: No nausea, vomiting, hematemesis, melena, or hematochezia.   : No urinary frequency, dysuria, or hematuria.   Integrument: Negative.   Psychiatric: No evidence of major depression  Neuro: No new neurological complaints at this time. Non focal  Endocrinology: Negative.   Musculoskeletal: As per HPI.      EXAM:  BP (!) 148/82 (BP Location: Right arm, Patient Position: Chair, Cuff Size: Adult Large)   Pulse 53   Wt 117.9 kg (260 lb)   SpO2 98%   BMI 37.86 kg/m    General: appears comfortable, alert and oriented  Head: normocephalic, atraumatic  Eyes: anicteric sclera, EOMI , PERRL  Neck: no adenopathy  Orophyarynx: moist mucosa, no lesions noted  Heart: regular, S1/S2, no murmurs, rubs or gallop. Estimated JVP at 5 cmH2O  Lungs: CTAB, No wheezing.   Abdomen: soft, non-tender, bowel sounds present, no hepatosplenomegaly  Extremities: No LE edema today  Skin: no open lesions noted  Neuro: grossly non-focal  Psych: no evidence of depression noted     Labs:  Lab Results   Component Value Date    WBC 6.8 02/17/2022    HGB 13.2 (L) 02/17/2022    HCT 39.9 (L) 02/17/2022     02/17/2022     02/17/2022    POTASSIUM 4.4 02/17/2022    CHLORIDE 108 02/17/2022    CO2 22 02/17/2022    BUN 15 02/17/2022    CR 1.35 (H) 02/17/2022    GLC 88 02/17/2022    NTBNP 1,920 (H) 02/17/2022    AST 22 02/17/2022    ALT 28 02/17/2022    ALKPHOS 70 02/17/2022    BILITOTAL 0.8 02/17/2022    INR 1.0 06/04/2015     Echocardiogram  reviewed 12/2018:  Global and regional left ventricular function is normal with an EF of 55-60%.  The right ventricle is normal size. Global right ventricular function is  Normal. Mild aortic valve sclerosis is present. The proximal Asc Aorta measures 4.2cm in diameter. The aortic root index is 1.8cm/m2 (Normal index for males is 1.4-1.7cm/m2) No pericardial effusion is present.     Ziopatch 3/2019:   62% a-fib burden, mostly rate controlled.     TTE 2/25/22  Technically difficult study.Extremely poor acoustic windows.  Global and regional left ventricular function is normal with an EF of 55-60%.  Diastolic function not assessed due to atrial fibrillation.  Right ventricular function, chamber size, wall motion, and thickness are normal.  Pulmonary artery systolic pressure is normal.  Sinuses of Valsalva 4.3 cm.  Ascending aorta 4.3 cm.  The inferior vena cava cannot be assessed. No pericardial effusion is present.  No significant changes noted.     ASSESSMENT AND PLAN:  In summary, patient is a 81 year old history of hypertension hyperlipidemia and paroxysmal atrial fibrillation on apixaban who presented to the cardiology clinic to follow up.    Patient remains in atrial fibrillation however he is heart rate is well controlled.  His blood pressure is also pretty well controlled at this time.  He has no complications from apixaban use tolerated well.  Overall feels well.  Laboratory including CBC CMP reviewed in person.  Also reviewed most recent echocardiogram.  Overall he continues to do very well without any complaints.  He does not have any palpitations except for at night where he infrequently in certain positions.  Otherwise he is atrial fibrillation is rate controlled and he does not have any symptoms associated with it.  If he is in normal having any effect on the quality of life.  He is doing well no medication changes are needed today.  Reviewed renew his levothyroxine as well as his metoprolol which he  asked for.  Otherwise no issues  We will see him back with labs and echocardiogram in a year unless there is any changes in his clinical condition.    Follow up:   1 year with labs and echo     I appreciate the opportunity to participate in the care of Sami Harmon . Please do not hesitate to contact me with any further questions.     Sincerely,   Benji Cornelius MD     [facility] Division of Cardiology       Please do not hesitate to contact me if you have any questions/concerns.     Sincerely,     Benji Cornelius MD

## 2022-03-18 NOTE — LETTER
Date:March 21, 2022      Patient was self referred, no letter generated. Do not send.        [facility] Health Information

## 2022-06-28 ENCOUNTER — IMMUNIZATION (OUTPATIENT)
Dept: NURSING | Facility: CLINIC | Age: 82
End: 2022-06-28
Payer: MEDICARE

## 2022-06-28 DIAGNOSIS — Z23 NEED FOR VACCINATION: Primary | ICD-10-CM

## 2022-06-28 PROCEDURE — 91305 COVID-19,PF,PFIZER (12+ YRS): CPT

## 2022-06-28 PROCEDURE — 99207 PR NO CHARGE NURSE ONLY: CPT

## 2022-06-28 PROCEDURE — 0054A COVID-19,PF,PFIZER (12+ YRS): CPT

## 2023-02-07 ENCOUNTER — MYC MEDICAL ADVICE (OUTPATIENT)
Dept: FAMILY MEDICINE | Facility: CLINIC | Age: 83
End: 2023-02-07
Payer: MEDICARE

## 2023-02-07 DIAGNOSIS — E78.5 HYPERLIPIDEMIA LDL GOAL <100: ICD-10-CM

## 2023-02-07 DIAGNOSIS — Z00.00 ANNUAL PHYSICAL EXAM: Primary | ICD-10-CM

## 2023-02-13 DIAGNOSIS — I48.0 PAROXYSMAL ATRIAL FIBRILLATION (H): ICD-10-CM

## 2023-02-13 DIAGNOSIS — I10 HYPERTENSION GOAL BP (BLOOD PRESSURE) < 140/90: ICD-10-CM

## 2023-02-13 DIAGNOSIS — E78.5 HYPERLIPIDEMIA LDL GOAL <100: ICD-10-CM

## 2023-02-13 RX ORDER — LOVASTATIN 40 MG
40 TABLET ORAL AT BEDTIME
Qty: 90 TABLET | Refills: 3 | Status: SHIPPED | OUTPATIENT
Start: 2023-02-13 | End: 2023-02-13

## 2023-02-13 RX ORDER — LOSARTAN POTASSIUM 100 MG/1
100 TABLET ORAL DAILY
Qty: 90 TABLET | Refills: 3 | Status: SHIPPED | OUTPATIENT
Start: 2023-02-13 | End: 2024-02-26

## 2023-02-13 RX ORDER — LOVASTATIN 40 MG
40 TABLET ORAL AT BEDTIME
Qty: 90 TABLET | Refills: 3 | Status: SHIPPED | OUTPATIENT
Start: 2023-02-13 | End: 2024-02-26

## 2023-02-13 RX ORDER — LOSARTAN POTASSIUM 100 MG/1
100 TABLET ORAL DAILY
Qty: 90 TABLET | Refills: 3 | Status: SHIPPED | OUTPATIENT
Start: 2023-02-13 | End: 2023-02-13

## 2023-02-13 NOTE — TELEPHONE ENCOUNTER
Pt calling wanting his medications sent to a new pharmacy     RN sent medications to preferred pharmacy    Carlyn Godfrey RN

## 2023-02-22 ASSESSMENT — ENCOUNTER SYMPTOMS
PARESTHESIAS: 0
FREQUENCY: 0
FEVER: 0
COUGH: 1
JOINT SWELLING: 0
HEMATURIA: 0
HEADACHES: 0
NAUSEA: 0
HEMATOCHEZIA: 0
ABDOMINAL PAIN: 0
DIZZINESS: 0
CONSTIPATION: 0
SHORTNESS OF BREATH: 0
WEAKNESS: 0
ARTHRALGIAS: 0
CHILLS: 0
SORE THROAT: 0
DIARRHEA: 0
NERVOUS/ANXIOUS: 0
HEARTBURN: 0
PALPITATIONS: 0
MYALGIAS: 0
DYSURIA: 0
EYE PAIN: 0

## 2023-02-22 ASSESSMENT — ACTIVITIES OF DAILY LIVING (ADL): CURRENT_FUNCTION: NO ASSISTANCE NEEDED

## 2023-02-24 ENCOUNTER — ANCILLARY PROCEDURE (OUTPATIENT)
Dept: CARDIOLOGY | Facility: CLINIC | Age: 83
End: 2023-02-24
Attending: INTERNAL MEDICINE
Payer: MEDICARE

## 2023-02-24 ENCOUNTER — LAB (OUTPATIENT)
Dept: LAB | Facility: CLINIC | Age: 83
End: 2023-02-24
Payer: MEDICARE

## 2023-02-24 DIAGNOSIS — I50.30 NYHA CLASS 3 HEART FAILURE WITH PRESERVED EJECTION FRACTION (H): ICD-10-CM

## 2023-02-24 DIAGNOSIS — Z00.00 ANNUAL PHYSICAL EXAM: ICD-10-CM

## 2023-02-24 DIAGNOSIS — E78.5 HYPERLIPIDEMIA LDL GOAL <100: ICD-10-CM

## 2023-02-24 LAB
ALBUMIN SERPL-MCNC: 3.9 G/DL (ref 3.4–5)
ALP SERPL-CCNC: 64 U/L (ref 40–150)
ALT SERPL W P-5'-P-CCNC: 28 U/L (ref 0–70)
ANION GAP SERPL CALCULATED.3IONS-SCNC: 2 MMOL/L (ref 3–14)
AST SERPL W P-5'-P-CCNC: 23 U/L (ref 0–45)
BILIRUB SERPL-MCNC: 1.1 MG/DL (ref 0.2–1.3)
BUN SERPL-MCNC: 19 MG/DL (ref 7–30)
CALCIUM SERPL-MCNC: 9.3 MG/DL (ref 8.5–10.1)
CHLORIDE BLD-SCNC: 107 MMOL/L (ref 94–109)
CHOLEST SERPL-MCNC: 126 MG/DL
CO2 SERPL-SCNC: 29 MMOL/L (ref 20–32)
CREAT SERPL-MCNC: 1.43 MG/DL (ref 0.66–1.25)
ERYTHROCYTE [DISTWIDTH] IN BLOOD BY AUTOMATED COUNT: 13.7 % (ref 10–15)
FASTING STATUS PATIENT QL REPORTED: YES
GFR SERPL CREATININE-BSD FRML MDRD: 48 ML/MIN/1.73M2
GLUCOSE BLD-MCNC: 103 MG/DL (ref 70–99)
HCT VFR BLD AUTO: 39.1 % (ref 40–53)
HDLC SERPL-MCNC: 59 MG/DL
HGB BLD-MCNC: 14 G/DL (ref 13.3–17.7)
LDLC SERPL CALC-MCNC: 54 MG/DL
LVEF ECHO: NORMAL
MCH RBC QN AUTO: 35.8 PG (ref 26.5–33)
MCHC RBC AUTO-ENTMCNC: 35.8 G/DL (ref 31.5–36.5)
MCV RBC AUTO: 100 FL (ref 78–100)
NONHDLC SERPL-MCNC: 67 MG/DL
NT-PROBNP SERPL-MCNC: 2167 PG/ML (ref 0–1800)
PLATELET # BLD AUTO: 163 10E3/UL (ref 150–450)
POTASSIUM BLD-SCNC: 4.6 MMOL/L (ref 3.4–5.3)
PROT SERPL-MCNC: 7.6 G/DL (ref 6.8–8.8)
RBC # BLD AUTO: 3.91 10E6/UL (ref 4.4–5.9)
SODIUM SERPL-SCNC: 138 MMOL/L (ref 133–144)
TRIGL SERPL-MCNC: 64 MG/DL
TSH SERPL DL<=0.005 MIU/L-ACNC: 1.74 MU/L (ref 0.4–4)
WBC # BLD AUTO: 8.2 10E3/UL (ref 4–11)

## 2023-02-24 PROCEDURE — 84443 ASSAY THYROID STIM HORMONE: CPT

## 2023-02-24 PROCEDURE — 99207 PR STATISTIC IV PUSH SINGLE INITIAL SUBSTANCE: CPT | Performed by: INTERNAL MEDICINE

## 2023-02-24 PROCEDURE — 36415 COLL VENOUS BLD VENIPUNCTURE: CPT

## 2023-02-24 PROCEDURE — 80061 LIPID PANEL: CPT

## 2023-02-24 PROCEDURE — 85027 COMPLETE CBC AUTOMATED: CPT

## 2023-02-24 PROCEDURE — 80053 COMPREHEN METABOLIC PANEL: CPT

## 2023-02-24 PROCEDURE — 93306 TTE W/DOPPLER COMPLETE: CPT | Performed by: INTERNAL MEDICINE

## 2023-02-24 PROCEDURE — 83880 ASSAY OF NATRIURETIC PEPTIDE: CPT

## 2023-02-24 RX ADMIN — Medication 6 ML: at 11:22

## 2023-03-01 ENCOUNTER — OFFICE VISIT (OUTPATIENT)
Dept: FAMILY MEDICINE | Facility: CLINIC | Age: 83
End: 2023-03-01
Payer: MEDICARE

## 2023-03-01 VITALS
WEIGHT: 255 LBS | BODY MASS INDEX: 35.7 KG/M2 | OXYGEN SATURATION: 97 % | RESPIRATION RATE: 20 BRPM | HEART RATE: 62 BPM | TEMPERATURE: 97.9 F | SYSTOLIC BLOOD PRESSURE: 133 MMHG | DIASTOLIC BLOOD PRESSURE: 70 MMHG | HEIGHT: 71 IN

## 2023-03-01 DIAGNOSIS — I48.0 PAROXYSMAL ATRIAL FIBRILLATION (H): ICD-10-CM

## 2023-03-01 DIAGNOSIS — Z00.00 ENCOUNTER FOR MEDICARE ANNUAL WELLNESS EXAM: Primary | ICD-10-CM

## 2023-03-01 DIAGNOSIS — N18.30 STAGE 3 CHRONIC KIDNEY DISEASE, UNSPECIFIED WHETHER STAGE 3A OR 3B CKD (H): ICD-10-CM

## 2023-03-01 DIAGNOSIS — H35.30 MACULAR DEGENERATION OF BOTH EYES, UNSPECIFIED TYPE: ICD-10-CM

## 2023-03-01 DIAGNOSIS — E03.9 ACQUIRED HYPOTHYROIDISM: ICD-10-CM

## 2023-03-01 DIAGNOSIS — I50.30 NYHA CLASS 3 HEART FAILURE WITH PRESERVED EJECTION FRACTION (H): ICD-10-CM

## 2023-03-01 DIAGNOSIS — E66.01 MORBID OBESITY (H): ICD-10-CM

## 2023-03-01 PROBLEM — H35.3211 EXUDATIVE AGE-RELATED MACULAR DEGENERATION, RIGHT EYE, WITH ACTIVE CHOROIDAL NEOVASCULARIZATION (H): Status: RESOLVED | Noted: 2022-02-24 | Resolved: 2023-03-01

## 2023-03-01 PROCEDURE — 99214 OFFICE O/P EST MOD 30 MIN: CPT | Mod: 25 | Performed by: FAMILY MEDICINE

## 2023-03-01 PROCEDURE — G0439 PPPS, SUBSEQ VISIT: HCPCS | Performed by: FAMILY MEDICINE

## 2023-03-01 RX ORDER — LEVOTHYROXINE SODIUM 200 UG/1
200 TABLET ORAL DAILY
Qty: 90 TABLET | Refills: 3 | Status: SHIPPED | OUTPATIENT
Start: 2023-03-01 | End: 2024-02-26

## 2023-03-01 ASSESSMENT — ENCOUNTER SYMPTOMS
NAUSEA: 0
JOINT SWELLING: 0
CONSTIPATION: 0
SORE THROAT: 0
HEADACHES: 0
DYSURIA: 0
ALLERGIC/IMMUNOLOGIC NEGATIVE: 1
ENDOCRINE NEGATIVE: 1
DIZZINESS: 0
PALPITATIONS: 0
NERVOUS/ANXIOUS: 0
HEARTBURN: 0
MYALGIAS: 0
ABDOMINAL PAIN: 0
EYE PAIN: 0
SHORTNESS OF BREATH: 0
FREQUENCY: 0
HEMATURIA: 0
HEMATOLOGIC/LYMPHATIC NEGATIVE: 1
DIARRHEA: 0
COUGH: 1
FEVER: 0
CHILLS: 0
HEMATOCHEZIA: 0
PARESTHESIAS: 0
WEAKNESS: 0
ARTHRALGIAS: 0

## 2023-03-01 ASSESSMENT — PAIN SCALES - GENERAL: PAINLEVEL: NO PAIN (0)

## 2023-03-01 ASSESSMENT — ACTIVITIES OF DAILY LIVING (ADL): CURRENT_FUNCTION: NO ASSISTANCE NEEDED

## 2023-03-01 NOTE — PROGRESS NOTES
"SUBJECTIVE:   Khadar is a 82 year old who presents for Preventive Visit.     Comes for an annual exam.  Doing well.  Reviewed his lab works with him.  History of paroxysmal A-fib, congestive heart failure, he has no symptoms.  He does follow-up with cardiology.   Recommend Tdap at pharmacy.    Patient has been advised of split billing requirements and indicates understanding: Yes  Are you in the first 12 months of your Medicare coverage?  No    Healthy Habits:     In general, how would you rate your overall health?  Good    Frequency of exercise:  2-3 days/week    Duration of exercise:  15-30 minutes    Do you usually eat at least 4 servings of fruit and vegetables a day, include whole grains    & fiber and avoid regularly eating high fat or \"junk\" foods?  Yes    Taking medications regularly:  Yes    Barriers to taking medications:  None    Medication side effects:  None    Ability to successfully perform activities of daily living:  No assistance needed    Home Safety:  No safety concerns identified    Hearing Impairment:  Difficulty following a conversation in a noisy restaurant or crowded room, need to ask people to speak up or repeat themselves, find that men's voices are easier to understand than woman's and difficulty understanding soft or whispered speech    In the past 6 months, have you been bothered by leaking of urine?  No    In general, how would you rate your overall mental or emotional health?  Excellent      PHQ-2 Total Score: 0    Additional concerns today:  Yes      Have you ever done Advance Care Planning? (For example, a Health Directive, POLST, or a discussion with a medical provider or your loved ones about your wishes): Yes, advance care planning is on file.       Fall risk  Fallen 2 or more times in the past year?: No  Any fall with injury in the past year?: No    Cognitive Screening   1) Repeat 3 items (Leader, Season, Table)    2) Clock draw: NORMAL  3) 3 item recall: Recalls 3 " objects  Results: 3 items recalled: COGNITIVE IMPAIRMENT LESS LIKELY    Mini-CogTM Copyright ISA Paniagua. Licensed by the author for use in [facility]; reprinted with permission (janeth@.[facility]). All rights reserved.      Do you have sleep apnea, excessive snoring or daytime drowsiness?: no    Reviewed and updated as needed this visit by clinical staff   Tobacco  Allergies  Meds   Med Hx  Surg Hx  Fam Hx  Soc Hx        Reviewed and updated as needed this visit by Provider                 Social History     Tobacco Use     Smoking status: Never     Smokeless tobacco: Former     Types: Chew     Quit date: 12/12/2000     Tobacco comments:     Chewed tobacco for 30 years   Substance Use Topics     Alcohol use: Yes     Comment: 1 or less drink per day         Alcohol Use 2/22/2023   Prescreen: >3 drinks/day or >7 drinks/week? No   No flowsheet data found.      Current providers sharing in care for this patient include:   Patient Care Team:  No Ref-Primary, Physician as PCP - Mona Holt, RN as Specialty Care Coordinator (Cardiology)  Huy Ma MD as Assigned PCP  Piyush Lopez RN as Specialty Care Coordinator (Cardiology)    The following health maintenance items are reviewed in Epic and correct as of today:  Health Maintenance   Topic Date Due     DTAP/TDAP/TD IMMUNIZATION (3 - Td or Tdap) 10/10/2022     COLORECTAL CANCER SCREENING  11/08/2022     MICROALBUMIN  02/17/2023     ANNUAL REVIEW OF HM ORDERS  02/24/2023     PSA  02/17/2023     MEDICARE ANNUAL WELLNESS VISIT  02/24/2023     BMP  02/24/2024     LIPID  02/24/2024     TSH W/FREE T4 REFLEX  02/24/2024     HEMOGLOBIN  02/24/2024     FALL RISK ASSESSMENT  03/01/2024     ADVANCE CARE PLANNING  02/24/2027     PHQ-2 (once per calendar year)  Completed     INFLUENZA VACCINE  Completed     Pneumococcal Vaccine: 65+ Years  Completed     URINALYSIS  Completed     ZOSTER IMMUNIZATION  Completed     COVID-19 Vaccine  Completed     IPV  "IMMUNIZATION  Aged Out     MENINGITIS IMMUNIZATION  Aged Out     Lab work is in process  Labs reviewed in EPIC  BP Readings from Last 3 Encounters:   03/01/23 133/70   03/18/22 (!) 148/82   02/24/22 110/72    Wt Readings from Last 3 Encounters:   03/01/23 115.7 kg (255 lb)   03/18/22 117.9 kg (260 lb)   02/24/22 113.4 kg (250 lb)               Review of Systems   Constitutional: Negative for chills and fever.   HENT: Positive for hearing loss. Negative for congestion, ear pain and sore throat.    Eyes: Negative for pain and visual disturbance.   Respiratory: Positive for cough. Negative for shortness of breath.    Cardiovascular: Negative for chest pain, palpitations and peripheral edema.   Gastrointestinal: Negative for abdominal pain, constipation, diarrhea, heartburn, hematochezia and nausea.   Endocrine: Negative.    Genitourinary: Positive for urgency. Negative for dysuria, frequency, genital sores, hematuria, impotence and penile discharge.   Musculoskeletal: Negative for arthralgias, joint swelling and myalgias.   Skin: Negative for rash.   Allergic/Immunologic: Negative.    Neurological: Negative for dizziness, weakness, headaches and paresthesias.   Hematological: Negative.    Psychiatric/Behavioral: Negative for mood changes. The patient is not nervous/anxious.      Constitutional, HEENT, cardiovascular, pulmonary, GI, , musculoskeletal, neuro, skin, endocrine and psych systems are negative, except as otherwise noted.    OBJECTIVE:   /70 (BP Location: Right arm, Patient Position: Chair, Cuff Size: Adult Large)   Pulse 62   Temp 97.9  F (36.6  C) (Tympanic)   Resp 20   Ht 1.807 m (5' 11.16\")   Wt 115.7 kg (255 lb)   SpO2 97%   BMI 35.40 kg/m   Estimated body mass index is 35.4 kg/m  as calculated from the following:    Height as of this encounter: 1.807 m (5' 11.16\").    Weight as of this encounter: 115.7 kg (255 lb).  Physical Exam  GENERAL: healthy, alert and no distress  EYES: Eyes grossly " normal to inspection, PERRL and conjunctivae and sclerae normal  HENT: ear canals and TM's normal, nose and mouth without ulcers or lesions  NECK: no adenopathy, no asymmetry, masses, or scars and thyroid normal to palpation  RESP: lungs clear to auscultation - no rales, rhonchi or wheezes  CV: regular rate and rhythm, normal S1 S2, no S3 or S4, no murmur, click or rub, no peripheral edema and peripheral pulses strong  ABDOMEN: soft, nontender, no hepatosplenomegaly, no masses and bowel sounds normal  MS: no gross musculoskeletal defects noted, no edema  SKIN: no suspicious lesions or rashes  NEURO: Normal strength and tone, mentation intact and speech normal  PSYCH: mentation appears normal, affect normal/bright    Diagnostic Test Results:  Labs reviewed in Epic  Reviewed labs, Lipid panel, CMP, CBC, TSH    ASSESSMENT / PLAN:   (Z00.00) Encounter for Medicare annual wellness exam  (primary encounter diagnosis)  Comment: normal exam  Plan: Routine preventative care discussed.  Advised with diet, exercise, increase physical activity.  Risk for precautions discussed.  1 year annual exam follow-up recommended.  Tetanus vaccine at the pharmacy.  Reviewed labs with him    (N18.30) Stage 3 chronic kidney disease, unspecified whether stage 3a or 3b CKD (H)  Comment:   Plan: stable, reviewed labs.    (E03.9) Acquired hypothyroidism  Comment:   Plan: levothyroxine (SYNTHROID/LEVOTHROID) 200 MCG         tablet        Normal TSH  Continue with current medications.    (I50.30) NYHA class 3 heart failure with preserved ejection fraction (H)  Comment:   Plan: stable, weight remain stable, continue with current medications.  Follow up with Cardiology.    (I48.0) Paroxysmal atrial fibrillation (H)  Comment:   Plan: stable, continue with current medications  Follow up with Cardiology.    (E66.01) Morbid obesity (H)  Comment:   Plan: Advised with diet, exercise, increase physical activity.  Discussed diet modification.  Discussed  "with patient morbid obesity does increase risks for heart disease, increased risk for diabetes, sleep apnea.    (H35.30) Macular degeneration of both eyes, unspecified type  Comment:   Plan: stable, follow up with Ophthalmology.    Patient has been advised of split billing requirements and indicates understanding: Yes      COUNSELING:  Reviewed preventive health counseling, as reflected in patient instructions       Regular exercise       Healthy diet/nutrition       Fall risk prevention       Tdap at pharmacy      BMI:   Estimated body mass index is 35.4 kg/m  as calculated from the following:    Height as of this encounter: 1.807 m (5' 11.16\").    Weight as of this encounter: 115.7 kg (255 lb).   Weight management plan: Discussed healthy diet and exercise guidelines      He reports that he has never smoked. He quit smokeless tobacco use about 22 years ago.  His smokeless tobacco use included chew.      Appropriate preventive services were discussed with this patient, including applicable screening as appropriate for cardiovascular disease, diabetes, osteopenia/osteoporosis, and glaucoma.  As appropriate for age/gender, discussed screening for colorectal cancer, prostate cancer, breast cancer, and cervical cancer. Checklist reviewing preventive services available has been given to the patient.    Reviewed patients plan of care and provided an AVS. The Basic Care Plan (routine screening as documented in Health Maintenance) for Sami meets the Care Plan requirement. This Care Plan has been established and reviewed with the Patient.          Huy Ma MD  [facility]    Identified Health Risks:  "

## 2023-03-01 NOTE — PATIENT INSTRUCTIONS
Patient Education   Personalized Prevention Plan  You are due for the preventive services outlined below.  Your care team is available to assist you in scheduling these services.  If you have already completed any of these items, please share that information with your care team to update in your medical record.  Health Maintenance Due   Topic Date Due     Diptheria Tetanus Pertussis (DTAP/TDAP/TD) Vaccine (3 - Td or Tdap) 10/10/2022     Colorectal Cancer Screening  11/08/2022     Kidney Microalbumin Urine Test  02/17/2023     ANNUAL REVIEW OF HM ORDERS  02/24/2023     Prostate Test  02/17/2023     Annual Wellness Visit  02/24/2023

## 2023-03-03 ENCOUNTER — OFFICE VISIT (OUTPATIENT)
Dept: CARDIOLOGY | Facility: CLINIC | Age: 83
End: 2023-03-03
Attending: INTERNAL MEDICINE
Payer: MEDICARE

## 2023-03-03 VITALS
WEIGHT: 262.2 LBS | DIASTOLIC BLOOD PRESSURE: 75 MMHG | OXYGEN SATURATION: 97 % | HEART RATE: 58 BPM | BODY MASS INDEX: 36.4 KG/M2 | SYSTOLIC BLOOD PRESSURE: 138 MMHG

## 2023-03-03 DIAGNOSIS — I10 BENIGN ESSENTIAL HYPERTENSION: ICD-10-CM

## 2023-03-03 DIAGNOSIS — E78.2 MIXED HYPERLIPIDEMIA: ICD-10-CM

## 2023-03-03 DIAGNOSIS — I50.30 NYHA CLASS 3 HEART FAILURE WITH PRESERVED EJECTION FRACTION (H): Primary | ICD-10-CM

## 2023-03-03 DIAGNOSIS — E78.5 HYPERLIPIDEMIA LDL GOAL <100: ICD-10-CM

## 2023-03-03 PROCEDURE — 99214 OFFICE O/P EST MOD 30 MIN: CPT | Performed by: INTERNAL MEDICINE

## 2023-03-03 RX ORDER — METOPROLOL SUCCINATE 100 MG/1
150 TABLET, EXTENDED RELEASE ORAL DAILY
Qty: 135 TABLET | Refills: 3 | Status: SHIPPED | OUTPATIENT
Start: 2023-03-03 | End: 2023-03-18

## 2023-03-03 NOTE — PATIENT INSTRUCTIONS
Thank you for coming to the [facility] at [place]; please note the following instructions:    1. Dr Cornelius said you are doing great!   Follow up in 1 year with labs prior.     Let us know if you have any questions  or concerns prior to follow up.         If you have any questions regarding your visit, please contact your care team:     CARDIOLOGY  TELEPHONE NUMBER   Brigid MORGAN., Registered Nurse  Mona DINERO, Registered Nurse  Rdaha SOOD, Registered Medical Assistant  Theresa SELLERS, Certified Medical Assistant  Betina ZAPATA, Visit Facilitator [phone] (select option 1)    *After hours: [phone]   For Scheduling Appts:     [phone] (select option 1)    *After hours: [phone]   For the Device Clinic (Pacemakers and ICD's)  Gali ROBINS, Registered Nurse   During business hours: [phone]    *After business hours:  [phone] (select option 4)      Normal test result notifications will be released via [employer] or mailed within 7 business days.  All other test results, will be communicated via telephone once reviewed by your cardiologist.    If you need a medication refill, please contact your pharmacy.  Please allow 3 business days for your refill to be completed.    As always, thank you for trusting us with your health care needs!

## 2023-03-03 NOTE — NURSING NOTE
"Chief Complaint   Patient presents with     Follow Up     Pt reports no recent symptoms, 3/3/23 Dr. Cornelius annual return with echocardiogram and labs prior.       Initial /75 (BP Location: Left arm, Patient Position: Sitting, Cuff Size: Adult Large)   Pulse 58   Wt 118.9 kg (262 lb 3.2 oz)   SpO2 97%   BMI 36.40 kg/m   Estimated body mass index is 36.4 kg/m  as calculated from the following:    Height as of 3/1/23: 1.807 m (5' 11.16\").    Weight as of this encounter: 118.9 kg (262 lb 3.2 oz)..  BP completed using cuff size: JESUS Barajas    "

## 2023-03-03 NOTE — LETTER
3/3/2023      RE: Sami Harmon  [address] [place] [id number]       Dear Colleague,    Thank you for the opportunity to participate in the care of your patient, Sami Harmon, at the [facility] HEART [facility]. Please see a copy of my visit note below.    March 3, 2023     Faustino Resendiz is an 81 yo gentleman with minimal past medical history including HTN, HL seen by primary care provider and then by myself for atrial fibrillation. He was checking his blood pressure at home and he did notice that his blood pressure cuff shows irregular heart rate.  This prompted him asking his primary care provider about the regular heart rate and disease how his atrial fibrillation was discovered again he is completely symptomatic. He then came to cardiology clinic, was in sinus rhythm. Was started on low dose metoprolol and Ziopatch has been placed as results as below confirming paroxysmal atrial fibrillation, rate controlled with a burden of 62%. Today he returns to follow up.  Overall doing well, denies any issues. No palpitations, dizziness or lightheadedness. Takes all medications as prescribed. No falls or trauma, no bleeding.    PAST MEDICAL HISTORY:  Past Medical History:   Diagnosis Date     Acquired hypothyroidism 12/31/2018     Arthritis 2010     Essential hypertension, benign 2004     Macular degeneration 1980s     Paroxysmal atrial fibrillation (H) 12/31/2018     Pure hypercholesterolemia 2004    goal ldl <100     FAMILY HISTORY:  Family History   Problem Relation Age of Onset     Arthritis Mother      Eye Disorder Mother      Diabetes Mother         type 2     Hypertension Mother      Cancer - colorectal Mother      Cerebrovascular Disease Mother      Osteoporosis Mother      Obesity Mother      Colon Cancer Mother      Heart Disease Father      Prostate Cancer Father      Cerebrovascular Disease Father      Heart Disease Brother       Prostate Cancer Brother      Coronary Artery Disease Brother      Gastrointestinal Disease Son      Prostate Cancer Brother      SOCIAL HISTORY:  Social History     Socioeconomic History     Marital status:      Spouse name: Meredith     Number of children: 4   Occupational History     Occupation:      Employer: RETIRED   Tobacco Use     Smoking status: Never     Smokeless tobacco: Former     Types: Chew     Quit date: 12/12/2000     Tobacco comments:     Chewed tobacco for 30 years   Vaping Use     Vaping Use: Never used   Substance and Sexual Activity     Alcohol use: Yes     Comment: 1 or less drink per day     Drug use: No     Sexual activity: Not Currently     Partners: Female   Other Topics Concern     Parent/sibling w/ CABG, MI or angioplasty before 65F 55M? No      Service No     Blood Transfusions No     Caffeine Concern No     Occupational Exposure No     Hobby Hazards No     Sleep Concern No     Stress Concern No     Weight Concern No     Special Diet No     Back Care No     Exercise No     Bike Helmet No     Seat Belt Yes     Self-Exams Yes     CURRENT MEDICATIONS:  Current Outpatient Medications   Medication     apixaban ANTICOAGULANT (ELIQUIS ANTICOAGULANT) 5 MG tablet     Ferrous Sulfate (IRON SUPPLEMENT PO)     latanoprost (XALATAN) 0.005 % ophthalmic solution     levothyroxine (SYNTHROID/LEVOTHROID) 200 MCG tablet     losartan (COZAAR) 100 MG tablet     lovastatin (MEVACOR) 40 MG tablet     metoprolol succinate ER (TOPROL XL) 100 MG 24 hr tablet     MULTI-VITAMIN OR     Psyllium (METAMUCIL PO)     No current facility-administered medications for this visit.     ROS:   Constitutional: No fever, chills, or sweats. Weight is 0 lbs 0 oz  ENT: No visual disturbance, ear ache, epistaxis, sore throat.   Allergies/Immunologic: Negative.   Respiratory: No cough, hemoptysis.   Cardiovascular: As per HPI.   GI: No nausea, vomiting, hematemesis, melena, or hematochezia.   : No  urinary frequency, dysuria, or hematuria.   Integrument: Negative.   Psychiatric: No evidence of major depression  Neuro: No new neurological complaints at this time. Non focal  Endocrinology: Negative.   Musculoskeletal: As per HPI.      EXAM:  /75 (BP Location: Left arm, Patient Position: Sitting, Cuff Size: Adult Large)   Pulse 58   Wt 118.9 kg (262 lb 3.2 oz)   SpO2 97%   BMI 36.40 kg/m    General: appears comfortable, alert and oriented  Head: normocephalic, atraumatic  Eyes: anicteric sclera, EOMI , PERRL  Neck: no adenopathy  Orophyarynx: moist mucosa, no lesions noted  Heart: regular, S1/S2, no murmurs, rubs or gallop. Estimated JVP at 5 cmH2O  Lungs: CTAB, No wheezing.   Abdomen: soft, non-tender, bowel sounds present, no hepatosplenomegaly  Extremities: No LE edema today  Skin: no open lesions noted  Neuro: grossly non-focal  Psych: no evidence of depression noted     Labs:  Lab Results   Component Value Date    WBC 8.2 02/24/2023    HGB 14.0 02/24/2023    HCT 39.1 (L) 02/24/2023     02/24/2023     02/24/2023    POTASSIUM 4.6 02/24/2023    CHLORIDE 107 02/24/2023    CO2 29 02/24/2023    BUN 19 02/24/2023    CR 1.43 (H) 02/24/2023     (H) 02/24/2023    NTBNP 2,167 (H) 02/24/2023    AST 23 02/24/2023    ALT 28 02/24/2023    ALKPHOS 64 02/24/2023    BILITOTAL 1.1 02/24/2023    INR 1.0 06/04/2015     Echocardiogram reviewed 12/2018:  Global and regional left ventricular function is normal with an EF of 55-60%.  The right ventricle is normal size. Global right ventricular function is  Normal. Mild aortic valve sclerosis is present. The proximal Asc Aorta measures 4.2cm in diameter. The aortic root index is 1.8cm/m2 (Normal index for males is 1.4-1.7cm/m2) No pericardial effusion is present.     Ziopatch 3/2019:   62% a-fib burden, mostly rate controlled.      TTE 2/25/22  Technically difficult study.Extremely poor acoustic windows.  Global and regional left ventricular function is  normal with an EF of 55-60%.  Diastolic function not assessed due to atrial fibrillation.  Right ventricular function, chamber size, wall motion, and thickness are normal.  Pulmonary artery systolic pressure is normal.  Sinuses of Valsalva 4.3 cm.  Ascending aorta 4.3 cm.  The inferior vena cava cannot be assessed. No pericardial effusion is present.  No significant changes noted.     ASSESSMENT AND PLAN:  In summary, patient is a 82 year old history of hypertension hyperlipidemia and paroxysmal atrial fibrillation on apixaban who presented to the cardiology clinic to follow up.    Patient remains in atrial fibrillation however he is heart rate is well controlled.  His blood pressure is also pretty well controlled at this time.  He has no complications from apixaban use tolerated well.  Overall feels well.  Laboratory including CBC CMP reviewed in person.  Also reviewed most recent echocardiogram.  Doing well without any issues. Takes all medications. No palpitations, dizziness or lightheadedness, no bleeding. NO other issues.     Follow up:   1 year with labs     I appreciate the opportunity to participate in the care of Sami Harmon . Please do not hesitate to contact me with any further questions.         Please do not hesitate to contact me if you have any questions/concerns.     Sincerely,     Benji Cornelius MD

## 2023-03-03 NOTE — PROGRESS NOTES
March 3, 2023     Faustino Resendiz is an 83 yo gentleman with minimal past medical history including HTN, HL seen by primary care provider and then by myself for atrial fibrillation. He was checking his blood pressure at home and he did notice that his blood pressure cuff shows irregular heart rate.  This prompted him asking his primary care provider about the regular heart rate and disease how his atrial fibrillation was discovered again he is completely symptomatic. He then came to cardiology clinic, was in sinus rhythm. Was started on low dose metoprolol and Ziopatch has been placed as results as below confirming paroxysmal atrial fibrillation, rate controlled with a burden of 62%. Today he returns to follow up.  Overall doing well, denies any issues. No palpitations, dizziness or lightheadedness. Takes all medications as prescribed. No falls or trauma, no bleeding.    PAST MEDICAL HISTORY:  Past Medical History:   Diagnosis Date     Acquired hypothyroidism 12/31/2018     Arthritis 2010     Essential hypertension, benign 2004     Macular degeneration 1980s     Paroxysmal atrial fibrillation (H) 12/31/2018     Pure hypercholesterolemia 2004    goal ldl <100     FAMILY HISTORY:  Family History   Problem Relation Age of Onset     Arthritis Mother      Eye Disorder Mother      Diabetes Mother         type 2     Hypertension Mother      Cancer - colorectal Mother      Cerebrovascular Disease Mother      Osteoporosis Mother      Obesity Mother      Colon Cancer Mother      Heart Disease Father      Prostate Cancer Father      Cerebrovascular Disease Father      Heart Disease Brother      Prostate Cancer Brother      Coronary Artery Disease Brother      Gastrointestinal Disease Son      Prostate Cancer Brother      SOCIAL HISTORY:  Social History     Socioeconomic History     Marital status:      Spouse name: Meredith     Number of children: 4   Occupational History     Occupation:      Employer: RETIRED    Tobacco Use     Smoking status: Never     Smokeless tobacco: Former     Types: Chew     Quit date: 12/12/2000     Tobacco comments:     Chewed tobacco for 30 years   Vaping Use     Vaping Use: Never used   Substance and Sexual Activity     Alcohol use: Yes     Comment: 1 or less drink per day     Drug use: No     Sexual activity: Not Currently     Partners: Female   Other Topics Concern     Parent/sibling w/ CABG, MI or angioplasty before 65F 55M? No      Service No     Blood Transfusions No     Caffeine Concern No     Occupational Exposure No     Hobby Hazards No     Sleep Concern No     Stress Concern No     Weight Concern No     Special Diet No     Back Care No     Exercise No     Bike Helmet No     Seat Belt Yes     Self-Exams Yes     CURRENT MEDICATIONS:  Current Outpatient Medications   Medication     apixaban ANTICOAGULANT (ELIQUIS ANTICOAGULANT) 5 MG tablet     Ferrous Sulfate (IRON SUPPLEMENT PO)     latanoprost (XALATAN) 0.005 % ophthalmic solution     levothyroxine (SYNTHROID/LEVOTHROID) 200 MCG tablet     losartan (COZAAR) 100 MG tablet     lovastatin (MEVACOR) 40 MG tablet     metoprolol succinate ER (TOPROL XL) 100 MG 24 hr tablet     MULTI-VITAMIN OR     Psyllium (METAMUCIL PO)     No current facility-administered medications for this visit.     ROS:   Constitutional: No fever, chills, or sweats. Weight is 0 lbs 0 oz  ENT: No visual disturbance, ear ache, epistaxis, sore throat.   Allergies/Immunologic: Negative.   Respiratory: No cough, hemoptysis.   Cardiovascular: As per HPI.   GI: No nausea, vomiting, hematemesis, melena, or hematochezia.   : No urinary frequency, dysuria, or hematuria.   Integrument: Negative.   Psychiatric: No evidence of major depression  Neuro: No new neurological complaints at this time. Non focal  Endocrinology: Negative.   Musculoskeletal: As per HPI.      EXAM:  /75 (BP Location: Left arm, Patient Position: Sitting, Cuff Size: Adult Large)   Pulse 58    Wt 118.9 kg (262 lb 3.2 oz)   SpO2 97%   BMI 36.40 kg/m    General: appears comfortable, alert and oriented  Head: normocephalic, atraumatic  Eyes: anicteric sclera, EOMI , PERRL  Neck: no adenopathy  Orophyarynx: moist mucosa, no lesions noted  Heart: regular, S1/S2, no murmurs, rubs or gallop. Estimated JVP at 5 cmH2O  Lungs: CTAB, No wheezing.   Abdomen: soft, non-tender, bowel sounds present, no hepatosplenomegaly  Extremities: No LE edema today  Skin: no open lesions noted  Neuro: grossly non-focal  Psych: no evidence of depression noted     Labs:  Lab Results   Component Value Date    WBC 8.2 02/24/2023    HGB 14.0 02/24/2023    HCT 39.1 (L) 02/24/2023     02/24/2023     02/24/2023    POTASSIUM 4.6 02/24/2023    CHLORIDE 107 02/24/2023    CO2 29 02/24/2023    BUN 19 02/24/2023    CR 1.43 (H) 02/24/2023     (H) 02/24/2023    NTBNP 2,167 (H) 02/24/2023    AST 23 02/24/2023    ALT 28 02/24/2023    ALKPHOS 64 02/24/2023    BILITOTAL 1.1 02/24/2023    INR 1.0 06/04/2015     Echocardiogram reviewed 12/2018:  Global and regional left ventricular function is normal with an EF of 55-60%.  The right ventricle is normal size. Global right ventricular function is  Normal. Mild aortic valve sclerosis is present. The proximal Asc Aorta measures 4.2cm in diameter. The aortic root index is 1.8cm/m2 (Normal index for males is 1.4-1.7cm/m2) No pericardial effusion is present.     Ziopatch 3/2019:   62% a-fib burden, mostly rate controlled.      TTE 2/25/22  Technically difficult study.Extremely poor acoustic windows.  Global and regional left ventricular function is normal with an EF of 55-60%.  Diastolic function not assessed due to atrial fibrillation.  Right ventricular function, chamber size, wall motion, and thickness are normal.  Pulmonary artery systolic pressure is normal.  Sinuses of Valsalva 4.3 cm.  Ascending aorta 4.3 cm.  The inferior vena cava cannot be assessed. No pericardial effusion is  present.  No significant changes noted.     ASSESSMENT AND PLAN:  In summary, patient is a 82 year old history of hypertension hyperlipidemia and paroxysmal atrial fibrillation on apixaban who presented to the cardiology clinic to follow up.    Patient remains in atrial fibrillation however he is heart rate is well controlled.  His blood pressure is also pretty well controlled at this time.  He has no complications from apixaban use tolerated well.  Overall feels well.  Laboratory including CBC CMP reviewed in person.  Also reviewed most recent echocardiogram.  Doing well without any issues. Takes all medications. No palpitations, dizziness or lightheadedness, no bleeding. NO other issues.     Follow up:   1 year with labs     I appreciate the opportunity to participate in the care of Sami Harmon . Please do not hesitate to contact me with any further questions.     Sincerely,   Benji Cornelius MD  [facility] Division of Cardiology

## 2023-03-17 DIAGNOSIS — E78.2 MIXED HYPERLIPIDEMIA: ICD-10-CM

## 2023-03-17 DIAGNOSIS — I10 BENIGN ESSENTIAL HYPERTENSION: ICD-10-CM

## 2023-03-18 RX ORDER — METOPROLOL SUCCINATE 100 MG/1
150 TABLET, EXTENDED RELEASE ORAL DAILY
Qty: 135 TABLET | Refills: 3 | Status: SHIPPED | OUTPATIENT
Start: 2023-03-18 | End: 2024-02-26

## 2023-03-18 NOTE — TELEPHONE ENCOUNTER
METOPROLOL SUCC  MG TAB  Last Written Prescription Date:   3/3/2023  Last Fill Quantity: 135,   # refills: 3  Last Office Visit :  3/3/2023  Future Office visit:  None  135 Tabs, 3 Refills sent to jg Owusu RN  Central Triage Red Flags/Med Refills

## 2023-07-26 ENCOUNTER — OFFICE VISIT (OUTPATIENT)
Dept: FAMILY MEDICINE | Facility: CLINIC | Age: 83
End: 2023-07-26
Payer: MEDICARE

## 2023-07-26 VITALS
RESPIRATION RATE: 18 BRPM | DIASTOLIC BLOOD PRESSURE: 72 MMHG | HEIGHT: 71 IN | BODY MASS INDEX: 36.48 KG/M2 | WEIGHT: 260.6 LBS | TEMPERATURE: 97.9 F | OXYGEN SATURATION: 98 % | SYSTOLIC BLOOD PRESSURE: 121 MMHG | HEART RATE: 58 BPM

## 2023-07-26 DIAGNOSIS — I50.30 NYHA CLASS 3 HEART FAILURE WITH PRESERVED EJECTION FRACTION (H): ICD-10-CM

## 2023-07-26 DIAGNOSIS — I48.0 PAROXYSMAL ATRIAL FIBRILLATION (H): ICD-10-CM

## 2023-07-26 DIAGNOSIS — L98.9 SKIN LESION: ICD-10-CM

## 2023-07-26 DIAGNOSIS — N18.31 STAGE 3A CHRONIC KIDNEY DISEASE (H): ICD-10-CM

## 2023-07-26 DIAGNOSIS — E66.01 MORBID OBESITY (H): ICD-10-CM

## 2023-07-26 DIAGNOSIS — Z01.818 PREOP GENERAL PHYSICAL EXAM: Primary | ICD-10-CM

## 2023-07-26 DIAGNOSIS — H25.9 AGE-RELATED CATARACT OF BOTH EYES, UNSPECIFIED AGE-RELATED CATARACT TYPE: ICD-10-CM

## 2023-07-26 DIAGNOSIS — I10 HYPERTENSION GOAL BP (BLOOD PRESSURE) < 140/90: ICD-10-CM

## 2023-07-26 DIAGNOSIS — T14.8XXA NONHEALING NONSURGICAL WOUND LIMITED TO BREAKDOWN OF SKIN: ICD-10-CM

## 2023-07-26 LAB
BASOPHILS # BLD AUTO: 0 10E3/UL (ref 0–0.2)
BASOPHILS NFR BLD AUTO: 0 %
EOSINOPHIL # BLD AUTO: 0.2 10E3/UL (ref 0–0.7)
EOSINOPHIL NFR BLD AUTO: 3 %
ERYTHROCYTE [DISTWIDTH] IN BLOOD BY AUTOMATED COUNT: 13.3 % (ref 10–15)
HCT VFR BLD AUTO: 39 % (ref 40–53)
HGB BLD-MCNC: 13.2 G/DL (ref 13.3–17.7)
IMM GRANULOCYTES # BLD: 0.1 10E3/UL
IMM GRANULOCYTES NFR BLD: 1 %
LYMPHOCYTES # BLD AUTO: 3 10E3/UL (ref 0.8–5.3)
LYMPHOCYTES NFR BLD AUTO: 42 %
MCH RBC QN AUTO: 34.4 PG (ref 26.5–33)
MCHC RBC AUTO-ENTMCNC: 33.8 G/DL (ref 31.5–36.5)
MCV RBC AUTO: 102 FL (ref 78–100)
MONOCYTES # BLD AUTO: 0.8 10E3/UL (ref 0–1.3)
MONOCYTES NFR BLD AUTO: 12 %
NEUTROPHILS # BLD AUTO: 3 10E3/UL (ref 1.6–8.3)
NEUTROPHILS NFR BLD AUTO: 42 %
PLATELET # BLD AUTO: 148 10E3/UL (ref 150–450)
RBC # BLD AUTO: 3.84 10E6/UL (ref 4.4–5.9)
WBC # BLD AUTO: 7 10E3/UL (ref 4–11)

## 2023-07-26 PROCEDURE — 93000 ELECTROCARDIOGRAM COMPLETE: CPT | Performed by: STUDENT IN AN ORGANIZED HEALTH CARE EDUCATION/TRAINING PROGRAM

## 2023-07-26 PROCEDURE — 80053 COMPREHEN METABOLIC PANEL: CPT | Performed by: STUDENT IN AN ORGANIZED HEALTH CARE EDUCATION/TRAINING PROGRAM

## 2023-07-26 PROCEDURE — 99214 OFFICE O/P EST MOD 30 MIN: CPT | Performed by: STUDENT IN AN ORGANIZED HEALTH CARE EDUCATION/TRAINING PROGRAM

## 2023-07-26 PROCEDURE — 85025 COMPLETE CBC W/AUTO DIFF WBC: CPT | Performed by: STUDENT IN AN ORGANIZED HEALTH CARE EDUCATION/TRAINING PROGRAM

## 2023-07-26 PROCEDURE — 36415 COLL VENOUS BLD VENIPUNCTURE: CPT | Performed by: STUDENT IN AN ORGANIZED HEALTH CARE EDUCATION/TRAINING PROGRAM

## 2023-07-26 ASSESSMENT — PAIN SCALES - GENERAL: PAINLEVEL: NO PAIN (0)

## 2023-07-26 NOTE — PROGRESS NOTES
[address] [address]  Phone: [phone]  Primary Provider: No Ref-Primary, Physician  Pre-op Performing Provider: SHUBHAM PERRY      PREOPERATIVE EVALUATION:  Today's date: 7/26/2023    Sami Harmon is a 82 year old male who presents for a preoperative evaluation.    Surgical Information:  Surgery/Procedure: EXCISION CATARACT WITH LENS IMPLANT   Surgery Location: [facility]   Surgeon: Douglas Sawyer MD   Surgery Date: 8/2/2023 and 8/23/2023  Time of Surgery: 1:22PM and 1:45PM   Where patient plans to recover: At home with family  Fax number for surgical facility: [phone]     Assessment & Plan     The proposed surgical procedure is considered LOW risk.    Preop general physical exam  Approval given to proceed with surgery.   Updating labs due to chronic diseases.     Age-related cataract of both eyes, unspecified age-related cataract type  Scheduled for bilateral cataract surgery    Stage 3a chronic kidney disease (H)  Recheck renal function. Has been stable at 3a.   - Comprehensive metabolic panel (BMP + Alb, Alk Phos, ALT, AST, Total. Bili, TP); Future  - Comprehensive metabolic panel (BMP + Alb, Alk Phos, ALT, AST, Total. Bili, TP)    NYHA class 3 heart failure with preserved ejection fraction (H)  Recheck labs today. Some LE edema but overall appears euvolemic on exam. Recommend compression stockings.   EKG afib with RBBB - unchanged from previous  - Comprehensive metabolic panel (BMP + Alb, Alk Phos, ALT, AST, Total. Bili, TP); Future  - CBC with platelets and differential; Future  - EKG 12-lead complete w/read - Clinics  - Comprehensive metabolic panel (BMP + Alb, Alk Phos, ALT, AST, Total. Bili, TP)  - CBC with platelets and differential    Morbid obesity (H)  Secondary to BMI >35 plus co-morbidities. Noted for risk stratification purposes    Paroxysmal atrial fibrillation (H)  EKG afib with RBBB -  unchanged from previous  Creatinine clearance calculated at 64ml/min   Hold eliquis the day before and day of procedure. Continue metoprolol.   - Comprehensive metabolic panel (BMP + Alb, Alk Phos, ALT, AST, Total. Bili, TP); Future  - EKG 12-lead complete w/read - Clinics  - Comprehensive metabolic panel (BMP + Alb, Alk Phos, ALT, AST, Total. Bili, TP)    Hypertension goal BP (blood pressure) < 140/90  BP at goal. Chronic.   - Comprehensive metabolic panel (BMP + Alb, Alk Phos, ALT, AST, Total. Bili, TP); Future  - Comprehensive metabolic panel (BMP + Alb, Alk Phos, ALT, AST, Total. Bili, TP)    Nonhealing nonsurgical wound limited to breakdown of skin  Skin lesion  Noted on back of LLE, no sign of cellulitis. He may have re-injured (removed scab) a few times, but since this has been present for almost 3 months (May 2023) I would recommend a biopsy to rule out skin cancer  - Adult Dermatology Referral; Future           Risks and Recommendations:  The patient has the following additional risks and recommendations for perioperative complications:   - No identified additional risk factors other than previously addressed    Antiplatelet or Anticoagulation Medication Instructions:   - apixaban (Eliquis), edoxaban (Savaysa), rivaroxaban (Xarelto): Bleeding risk is low for this procedure AND CrCl (>=) 50 mL/min. HOLD 1 day before surgery.    Creatinine clearance is 64ml/min    Additional Medication Instructions:   - ACE/ARB: HOLD on day of surgery (minimum 11 hours for general anesthesia).   - Beta Blockers: Continue taking on the day of surgery.  - continue levothyroxine on day of surgery    RECOMMENDATION:  APPROVAL GIVEN to proceed with proposed procedure, without further diagnostic evaluation.        Subjective       HPI related to upcoming procedure:   Bilateral cataract surgery   No prior issues with anesthesia   Has known HF but has been able to do walking and yard work fine. Not going ot the gym much in the summer      On eliquis for manjinderfib   Toes bruised recently   Has been having some LE edema, mainly on feet. Doesn't wear compression stockings        7/19/2023     2:29 PM   Preop Questions   1. Have you ever had a heart attack or stroke? No   2. Have you ever had surgery on your heart or blood vessels, such as a stent placement, a coronary artery bypass, or surgery on an artery in your head, neck, heart, or legs? No   3. Do you have chest pain with activity? No   4. Do you have a history of  heart failure? YES    5. Do you currently have a cold, bronchitis or symptoms of other infection? No   6. Do you have a cough, shortness of breath, or wheezing? No   7. Do you or anyone in your family have previous history of blood clots? No   8. Do you or does anyone in your family have a serious bleeding problem such as prolonged bleeding following surgeries or cuts? No   9. Have you ever had problems with anemia or been told to take iron pills? YES - on iron    10. Have you had any abnormal blood loss such as black, tarry or bloody stools? No   11. Have you ever had a blood transfusion? No   12. Are you willing to have a blood transfusion if it is medically needed before, during, or after your surgery? Yes   13. Have you or any of your relatives ever had problems with anesthesia? No   14. Do you have sleep apnea, excessive snoring or daytime drowsiness? No   15. Do you have any artifical heart valves or other implanted medical devices like a pacemaker, defibrillator, or continuous glucose monitor? No   16. Do you have artificial joints? YES    17. Are you allergic to latex? No       Health Care Directive:  Patient has a Health Care Directive on file      Preoperative Review of :   reviewed - no record of controlled substances prescribed.        Status of Chronic Conditions:  A-FIB - Patient has a longstanding history of chronic A-fib currently on rate control. Current treatment regimen includes Apixaban for stroke prevention and  denies significant symptoms of lightheadedness, palpitations or dyspnea.     CHF - Patient has a longstanding history of moderate-severe CHF. Exacerbating conditions include atrial fibrilation. Currently the patient's condition is same. Current treatment regimen includes Angiotensin 2 receptor blocker and beta blocker. The patient denies chest pain, edema, orthopnea, SOB or recent weight gain. Last Echocardiogram 02/24/2023 - EF 55-60%, EKG a.fib with RBBB.     Review of Systems  Constitutional, neuro, ENT, endocrine, pulmonary, cardiac, gastrointestinal, genitourinary, musculoskeletal, integument and psychiatric systems are negative, except as otherwise noted.    Patient Active Problem List    Diagnosis Date Noted    Morbid obesity (H) 03/01/2023     Priority: Medium    Macular degeneration      Priority: Medium    NYHA class 3 heart failure with preserved ejection fraction (H) 02/24/2022     Priority: Medium    Paroxysmal atrial fibrillation (H) 12/31/2018     Priority: Medium    Acquired hypothyroidism 12/31/2018     Priority: Medium    Overweight 06/08/2016     Priority: Medium    Hypertension goal BP (blood pressure) < 140/90 05/12/2016     Priority: Medium    Obesity (BMI 30-39.9) 04/23/2015     Priority: Medium     Overview:   Body mass index is 33.63 kg/(m^2).       Advance care planning 10/10/2012     Priority: Medium    Primary localized osteoarthrosis, pelvic region and thigh 10/10/2012     Priority: Medium    History of colon polyps 10/10/2012     Priority: Medium    Advanced directives, counseling/discussion 10/10/2012     Priority: Medium     Advance Care Planning:   Receipt of ACP document: Received  Health Care Directive which was witnessed or notarized on: 03/03/2014  Document not previously scanned.    Reviewed for validity as medical order and sent to be scanned.   Code Status reflects choices in most recent ACP document.    Confirmed/documented designated decision maker(s).   See permanent  comments section of demographics in clinical tab.   View document(s) and details by clicking on code status.     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        Past Medical History:   Diagnosis Date    Acquired hypothyroidism 12/31/2018    Arthritis 2010    Essential hypertension, benign 2004    Macular degeneration 1980s    Paroxysmal atrial fibrillation (H) 12/31/2018    Pure hypercholesterolemia 2004    goal ldl <100     Past Surgical History:   Procedure Laterality Date    ABDOMEN SURGERY  1970's, 1980's    Two hernia surgerys    BIOPSY  July 2016    polyp removed from vocal cord - benign    COLONOSCOPY      COSMETIC SURGERY      EYE SURGERY  2000    Macular scar, presently being treated with avastin    HERNIA REPAIR, INGUINAL RT/LT  1970,1980    ORTHOPEDIC SURGERY  April 2015, June 2015    Right hip replacement, Left hip replacement    SURGICAL HISTORY OF -   2000    cholesteatoma removal L    SURGICAL HISTORY OF -   3/2004    laser macular    SURGICAL HISTORY OF -   4-2015    right hip replacement    TONSILLECTOMY & ADENOIDECTOMY  1961     Current Outpatient Medications   Medication Sig Dispense Refill    apixaban ANTICOAGULANT (ELIQUIS ANTICOAGULANT) 5 MG tablet Take 1 tablet (5 mg) by mouth 2 times daily 180 tablet 3    Ferrous Sulfate (IRON SUPPLEMENT PO) Take by mouth daily       latanoprost (XALATAN) 0.005 % ophthalmic solution Place 1 drop into both eyes daily      levothyroxine (SYNTHROID/LEVOTHROID) 200 MCG tablet Take 1 tablet (200 mcg) by mouth daily 90 tablet 3    losartan (COZAAR) 100 MG tablet Take 1 tablet (100 mg) by mouth daily 90  "tablet 3    lovastatin (MEVACOR) 40 MG tablet Take 1 tablet (40 mg) by mouth At Bedtime 90 tablet 3    metoprolol succinate ER (TOPROL XL) 100 MG 24 hr tablet Take 1.5 tablets (150 mg) by mouth daily 135 tablet 3    MULTI-VITAMIN OR 1 tablets  daily      Psyllium (METAMUCIL PO) Take by mouth daily 1 teaspoon added to 1 glass of water once daily         No Known Allergies     Social History     Tobacco Use    Smoking status: Never    Smokeless tobacco: Former     Types: Chew     Quit date: 12/12/2000    Tobacco comments:     Chewed tobacco for 30 years   Substance Use Topics    Alcohol use: Yes     Comment: 1 or less drink per day     Family History   Problem Relation Age of Onset    Arthritis Mother     Eye Disorder Mother     Diabetes Mother         type 2    Hypertension Mother     Cancer - colorectal Mother     Cerebrovascular Disease Mother     Osteoporosis Mother     Obesity Mother     Colon Cancer Mother     Heart Disease Father     Prostate Cancer Father     Cerebrovascular Disease Father     Heart Disease Brother     Prostate Cancer Brother     Coronary Artery Disease Brother     Gastrointestinal Disease Son     Prostate Cancer Brother      History   Drug Use No         Objective     /72   Pulse 58   Temp 97.9  F (36.6  C) (Oral)   Resp 18   Ht 1.807 m (5' 11.16\")   Wt 118.2 kg (260 lb 9.6 oz)   SpO2 98%   BMI 36.18 kg/m      Physical Exam    GENERAL APPEARANCE: healthy, alert and no distress     EYES: EOMI,  PERRL     HENT: ear canals and TM's normal and nose and mouth without ulcers or lesions     NECK: no adenopathy, no asymmetry, masses, or scars and thyroid normal to palpation     RESP: lungs clear to auscultation - no rales, rhonchi or wheezes     CV: regular rates and rhythm, normal S1 S2, no S3 or S4 and no murmur, click or rub     ABDOMEN:  soft, nontender, no HSM or masses and bowel sounds normal     MS: extremities normal- no gross deformities noted, no evidence of inflammation in " joints, FROM in all extremities.     SKIN: no suspicious lesions or rashes     NEURO: Normal strength and tone, sensory exam grossly normal, mentation intact and speech normal     PSYCH: mentation appears normal. and affect normal/bright     LYMPHATICS: No cervical adenopathy    Recent Labs   Lab Test 02/24/23  1038 02/17/22  0832 02/17/22  0749   HGB 14.0  --  13.2*     --  161    137  --    POTASSIUM 4.6 4.4  --    CR 1.43* 1.35*  --         Diagnostics:  Labs pending at this time.  Results will be reviewed when available.  Recent Results (from the past 24 hour(s))   CBC with platelets and differential    Collection Time: 07/26/23 10:53 AM   Result Value Ref Range    WBC Count 7.0 4.0 - 11.0 10e3/uL    RBC Count 3.84 (L) 4.40 - 5.90 10e6/uL    Hemoglobin 13.2 (L) 13.3 - 17.7 g/dL    Hematocrit 39.0 (L) 40.0 - 53.0 %     (H) 78 - 100 fL    MCH 34.4 (H) 26.5 - 33.0 pg    MCHC 33.8 31.5 - 36.5 g/dL    RDW 13.3 10.0 - 15.0 %    Platelet Count 148 (L) 150 - 450 10e3/uL    % Neutrophils 42 %    % Lymphocytes 42 %    % Monocytes 12 %    % Eosinophils 3 %    % Basophils 0 %    % Immature Granulocytes 1 %    Absolute Neutrophils 3.0 1.6 - 8.3 10e3/uL    Absolute Lymphocytes 3.0 0.8 - 5.3 10e3/uL    Absolute Monocytes 0.8 0.0 - 1.3 10e3/uL    Absolute Eosinophils 0.2 0.0 - 0.7 10e3/uL    Absolute Basophils 0.0 0.0 - 0.2 10e3/uL    Absolute Immature Granulocytes 0.1 <=0.4 10e3/uL      EKG required for significant arrhythmia and not completed in the last 90 days.   EKG: atrial fibrillation, rate normal, Right Bundle Branch Block, unchanged from previous tracings    Revised Cardiac Risk Index (RCRI):  The patient has the following serious cardiovascular risks for perioperative complications:   - No serious cardiac risks = 0 points     RCRI Interpretation: 0 points: Class I (very low risk - 0.4% complication rate)         Signed Electronically by: Mary Siddiqui DO  Copy of this evaluation report is  provided to requesting physician.

## 2023-07-26 NOTE — PATIENT INSTRUCTIONS
Medication instructions:     - HOLD eliquis on day of surgery AND the day prior (8/1 and 8/2)     - Losartan - HOLD On day of surgery     - Metoprolol - continue on day of surgery     - levothyroxine - continue on day of surgery     - HOLD all supplements for 1-2 weeks prior to surgery       You will get a call to schedule with dermatology for your non-healing skin lesion.

## 2023-07-26 NOTE — PROGRESS NOTES
[address] [address]  Phone: [phone]  Primary Provider: No Ref-Primary, Physician  Pre-op Performing Provider: SHUBHAM PERRY    {Provider  Link to PREOP SmartSet  Use this to apply standard patient instructions to AVS; includes medication directions, common orders, guidelines for anemia, warfarin, additional testing   :397957}  PREOPERATIVE EVALUATION:  Today's date: 7/26/2023    Sami Harmon is a 82 year old male who presents for a preoperative evaluation.      7/26/2023     8:53 AM   Additional Questions   Accompanied by na         7/26/2023     8:53 AM   Patient Reported Additional Medications   Patient reports taking the following new medications none     Surgical Information:  Surgery/Procedure: ***  Surgery Location: ***  Surgeon: ***  Surgery Date: ***  Time of Surgery: 08/03/2023  Where patient plans to recover: {Preop post recovery plans :796534}  Fax number for surgical facility: ***    {2021 Provider Charting Preference for Preop :590916}    Subjective       HPI related to upcoming procedure: ***        7/19/2023     2:29 PM   Preop Questions   1. Have you ever had a heart attack or stroke? No   2. Have you ever had surgery on your heart or blood vessels, such as a stent placement, a coronary artery bypass, or surgery on an artery in your head, neck, heart, or legs? No   3. Do you have chest pain with activity? No   4. Do you have a history of  heart failure? YES - ***   5. Do you currently have a cold, bronchitis or symptoms of other infection? No   6. Do you have a cough, shortness of breath, or wheezing? No   7. Do you or anyone in your family have previous history of blood clots? No   8. Do you or does anyone in your family have a serious bleeding problem such as prolonged bleeding following surgeries or cuts? No   9. Have you ever had problems with anemia or been told to take iron pills? YES - ***   10. Have you  had any abnormal blood loss such as black, tarry or bloody stools? No   11. Have you ever had a blood transfusion? No   12. Are you willing to have a blood transfusion if it is medically needed before, during, or after your surgery? Yes   13. Have you or any of your relatives ever had problems with anesthesia? No   14. Do you have sleep apnea, excessive snoring or daytime drowsiness? No   15. Do you have any artifical heart valves or other implanted medical devices like a pacemaker, defibrillator, or continuous glucose monitor? No   16. Do you have artificial joints? YES - ***   17. Are you allergic to latex? No       Health Care Directive:  Patient has a Health Care Directive on file      Preoperative Review of :  {Mnpmpreport:195517}  {Review MNPMP for all patients per [facility] MNPMP Profile:361191}    {Chronic problem details (Optional) :277531}    Review of Systems  {ROS Preop Choices:903549}    Patient Active Problem List    Diagnosis Date Noted    Morbid obesity (H) 03/01/2023     Priority: Medium    Macular degeneration      Priority: Medium    NYHA class 3 heart failure with preserved ejection fraction (H) 02/24/2022     Priority: Medium    Paroxysmal atrial fibrillation (H) 12/31/2018     Priority: Medium    Acquired hypothyroidism 12/31/2018     Priority: Medium    Overweight 06/08/2016     Priority: Medium    Hypertension goal BP (blood pressure) < 140/90 05/12/2016     Priority: Medium    Obesity (BMI 30-39.9) 04/23/2015     Priority: Medium     Overview:   Body mass index is 33.63 kg/(m^2).       Advance care planning 10/10/2012     Priority: Medium    Primary localized osteoarthrosis, pelvic region and thigh 10/10/2012     Priority: Medium    History of colon polyps 10/10/2012     Priority: Medium    Advanced directives, counseling/discussion 10/10/2012     Priority: Medium     Advance Care Planning:   Receipt of ACP document: Received  Health Care Directive which was witnessed or notarized on:  03/03/2014  Document not previously scanned.    Reviewed for validity as medical order and sent to be scanned.   Code Status reflects choices in most recent ACP document.    Confirmed/documented designated decision maker(s).   See permanent comments section of demographics in clinical tab.   View document(s) and details by clicking on code status.     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        Past Medical History:   Diagnosis Date    Acquired hypothyroidism 12/31/2018    Arthritis 2010    Essential hypertension, benign 2004    Macular degeneration 1980s    Paroxysmal atrial fibrillation (H) 12/31/2018    Pure hypercholesterolemia 2004    goal ldl <100     Past Surgical History:   Procedure Laterality Date    ABDOMEN SURGERY  1970's, 1980's    Two hernia surgerys    BIOPSY  July 2016    polyp removed from vocal cord - benign    COLONOSCOPY      COSMETIC SURGERY      EYE SURGERY  2000    Macular scar, presently being treated with avastin    HERNIA REPAIR, INGUINAL RT/LT  1970,1980    ORTHOPEDIC SURGERY  April 2015, June 2015    Right hip replacement, Left hip replacement    SURGICAL HISTORY OF -   2000    cholesteatoma removal L    SURGICAL HISTORY OF -   3/2004    laser macular    SURGICAL HISTORY OF -   4-2015    right hip replacement    TONSILLECTOMY & ADENOIDECTOMY  1961     Current Outpatient Medications   Medication Sig Dispense Refill    apixaban ANTICOAGULANT (ELIQUIS ANTICOAGULANT) 5 MG tablet Take 1 tablet (5 mg) by mouth 2 times daily 180 tablet 3    Ferrous Sulfate (IRON SUPPLEMENT PO) Take by mouth daily       latanoprost (XALATAN) 0.005 %  ophthalmic solution Place 1 drop into both eyes daily      levothyroxine (SYNTHROID/LEVOTHROID) 200 MCG tablet Take 1 tablet (200 mcg) by mouth daily 90 tablet 3    losartan (COZAAR) 100 MG tablet Take 1 tablet (100 mg) by mouth daily 90 tablet 3    lovastatin (MEVACOR) 40 MG tablet Take 1 tablet (40 mg) by mouth At Bedtime 90 tablet 3    metoprolol succinate ER (TOPROL XL) 100 MG 24 hr tablet Take 1.5 tablets (150 mg) by mouth daily 135 tablet 3    MULTI-VITAMIN OR 1 tablets  daily      Psyllium (METAMUCIL PO) Take by mouth daily 1 teaspoon added to 1 glass of water once daily         No Known Allergies     Social History     Tobacco Use    Smoking status: Never    Smokeless tobacco: Former     Types: Chew     Quit date: 2000    Tobacco comments:     Chewed tobacco for 30 years   Substance Use Topics    Alcohol use: Yes     Comment: 1 or less drink per day     {FAMILY HISTORY (Optional):125424995}  History   Drug Use No         Objective     There were no vitals taken for this visit.    Physical Exam  {EXAM Preop Choices:670005}    Recent Labs   Lab Test 23  1038 22  0832 22  0749   HGB 14.0  --  13.2*     --  161    137  --    POTASSIUM 4.6 4.4  --    CR 1.43* 1.35*  --         Diagnostics:  {LABS:207547}   {EK}    Revised Cardiac Risk Index (RCRI):  The patient has the following serious cardiovascular risks for perioperative complications:  {PREOP REVISED CARDIAC RISK INDEX (RCRI) :873009}     RCRI Interpretation: {REVISED CARDIAC RISK INTERPRETATION :235943}         Signed Electronically by: Mary Siddiqui DO  Copy of this evaluation report is provided to requesting physician.    {Provider Resources  Preop Count includes the [facility] Preop Guidelines  Revised Cardiac Risk Index :278700}

## 2023-07-27 LAB
ALBUMIN SERPL BCG-MCNC: 4.3 G/DL (ref 3.5–5.2)
ALP SERPL-CCNC: 61 U/L (ref 40–129)
ALT SERPL W P-5'-P-CCNC: 18 U/L (ref 0–70)
ANION GAP SERPL CALCULATED.3IONS-SCNC: 8 MMOL/L (ref 7–15)
AST SERPL W P-5'-P-CCNC: 30 U/L (ref 0–45)
BILIRUB SERPL-MCNC: 0.9 MG/DL
BUN SERPL-MCNC: 14.9 MG/DL (ref 8–23)
CALCIUM SERPL-MCNC: 9.3 MG/DL (ref 8.8–10.2)
CHLORIDE SERPL-SCNC: 104 MMOL/L (ref 98–107)
CREAT SERPL-MCNC: 1.39 MG/DL (ref 0.67–1.17)
DEPRECATED HCO3 PLAS-SCNC: 26 MMOL/L (ref 22–29)
GFR SERPL CREATININE-BSD FRML MDRD: 50 ML/MIN/1.73M2
GLUCOSE SERPL-MCNC: 87 MG/DL (ref 70–99)
POTASSIUM SERPL-SCNC: 5.1 MMOL/L (ref 3.4–5.3)
PROT SERPL-MCNC: 7.3 G/DL (ref 6.4–8.3)
SODIUM SERPL-SCNC: 138 MMOL/L (ref 136–145)

## 2023-10-03 ENCOUNTER — MYC MEDICAL ADVICE (OUTPATIENT)
Dept: FAMILY MEDICINE | Facility: CLINIC | Age: 83
End: 2023-10-03
Payer: MEDICARE

## 2023-10-12 ENCOUNTER — OFFICE VISIT (OUTPATIENT)
Dept: DERMATOLOGY | Facility: CLINIC | Age: 83
End: 2023-10-12
Payer: MEDICARE

## 2023-10-12 DIAGNOSIS — D48.9 NEOPLASM OF UNCERTAIN BEHAVIOR: ICD-10-CM

## 2023-10-12 PROCEDURE — 11102 TANGNTL BX SKIN SINGLE LES: CPT | Performed by: NURSE PRACTITIONER

## 2023-10-12 PROCEDURE — 88305 TISSUE EXAM BY PATHOLOGIST: CPT | Performed by: DERMATOLOGY

## 2023-10-12 NOTE — PATIENT INSTRUCTIONS
Wound Care Instructions     FOR SUPERFICIAL WOUNDS     [facility], [facility] or    [facility] [phone]          AFTER 24 HOURS YOU SHOULD REMOVE THE BANDAGE AND BEGIN DAILY DRESSING CHANGES AS FOLLOWS:     1) Remove Dressing.     2) Clean and dry the area with tap water using a Q-tip or sterile gauze pad.     3) Apply Vaseline, Aquaphor, Polysporin ointment or Bacitracin ointment over entire wound.  Do NOT use Neosporin ointment.     4) Cover the wound with a band-aid, or a sterile non-stick gauze pad and micropore paper tape      REPEAT THESE INSTRUCTIONS AT LEAST ONCE A DAY UNTIL THE WOUND HAS COMPLETELY HEALED.    It is an old wives tale that a wound heals better when it is exposed to air and allowed to dry out. The wound will heal faster with a better cosmetic result if it is kept moist with ointment and covered with a bandage.    **Do not let the wound dry out.**      Supplies Needed:      *Cotton tipped applicators (Q-tips)    *Polysporin Ointment or Bacitracin Ointment (NOT NEOSPORIN)    *Band-aids or non-stick gauze pads and micropore paper tape.      PATIENT INFORMATION:    During the healing process you will notice a number of changes. All wounds develop a small halo of redness surrounding the wound.  This means healing is occurring. Severe itching with extensive redness usually indicates sensitivity to the ointment or bandage tape used to dress the wound.  You should call our office if this develops.      Swelling  and/or discoloration around your surgical site is common, particularly when performed around the eye.    All wounds normally drain.  The larger the wound the more drainage there will be.  After 7-10 days, you will notice the wound beginning to shrink and new skin will begin to grow.  The wound is healed when you can see skin has formed over the entire area.  A healed wound has a healthy, shiny look to the surface and is red to dark pink in color to normalize.   Wounds may take approximately 4-6 weeks to heal.  Larger wounds may take 6-8 weeks.  After the wound is healed you may discontinue dressing changes.    You may experience a sensation of tightness as your wound heals. This is normal and will gradually subside.    Your healed wound may be sensitive to temperature changes. This sensitivity improves with time, but if you re having a lot of discomfort, try to avoid temperature extremes.    Patients frequently experience itching after their wound appears to have healed because of the continue healing under the skin.  Plain Vaseline will help relieve the itching.        POSSIBLE COMPLICATIONS    BLEEDING:    Leave the bandage in place.  Use tightly rolled up gauze or a cloth to apply direct pressure over the bandage for 30  minutes.  Reapply pressure for an additional 30 minutes if necessary  Use additional gauze and tape to maintain pressure once the bleeding has stopped.

## 2023-10-12 NOTE — PROGRESS NOTES
[facility] Dermatology Note  Encounter Date: Oct 12, 2023  Office Visit     Reviewed patients past medical history and pertinent chart review prior to patients visit today.     Dermatology Problem List:  NUB   left posterior calf, shave biopsy 10/12/23 .     Patient denies personal history of skin cancer or dysplastic nevi.    Patient denies family history of skin cancer or dysplastic nevi.      ____________________________________________    Assessment & Plan:     # Neoplasm of uncertain behavior:  left posterior calf  DDx includes pyogenic granuloma vs malignancy. Shave biopsy today.    Procedure Note: Biopsy by shave technique  The risks and benefits of the procedure were described to the patient. These include but are not limited to bleeding, infection, scar, incomplete removal, and non-diagnostic biopsy. Verbal informed consent was obtained. The above site(s) was cleansed with an alcohol pad and injected with 1% lidocaine with epinephrine. Once anesthesia was obtained, a biopsy(ies) was performed with Gilette blade. The tissue(s) was placed in a labeled container(s) with formalin and sent to pathology. Hemostasis was achieved with electrocautery. Vaseline and a pressure bandage were applied to the wound(s). The patient tolerated the procedure well and was given post biopsy care instructions.     Regi Schaffer, CNP  Dermatology    _______________________________________    CC: Derm Problem (Possible ulcer on posterior left calf, bumped site in May 2023./LE edema)    HPI:  Mr. Sami Harmon is a(n) 83 year old male who presents today as a new patient for a spot of concern behind the left calf. He thinks he injured it in May cleaning out brush in his yard, it continues to open up and bleed without trauma. He keeps it covered with bandaids.     Patient is otherwise feeling well, without additional skin concerns.      Physical Exam:  SKIN: Focused examination of left calf lesion was performed.  -  left posterior calf. 1.5 cm erythematous soft papule with active bleeding and surrounding erythema    - No other lesions of concern on areas examined.     Medications:  Current Outpatient Medications   Medication    apixaban ANTICOAGULANT (ELIQUIS ANTICOAGULANT) 5 MG tablet    Ferrous Sulfate (IRON SUPPLEMENT PO)    latanoprost (XALATAN) 0.005 % ophthalmic solution    levothyroxine (SYNTHROID/LEVOTHROID) 200 MCG tablet    losartan (COZAAR) 100 MG tablet    lovastatin (MEVACOR) 40 MG tablet    metoprolol succinate ER (TOPROL XL) 100 MG 24 hr tablet    MULTI-VITAMIN OR    Psyllium (METAMUCIL PO)     No current facility-administered medications for this visit.      Past Medical History:   Patient Active Problem List   Diagnosis    Family history of prostate cancer    Hyperlipidemia LDL goal <100    CKD (chronic kidney disease) stage 3, GFR 30-59 ml/min (H)    Advance care planning    Primary localized osteoarthrosis, pelvic region and thigh    History of colon polyps    Advanced directives, counseling/discussion    Hypertension goal BP (blood pressure) < 140/90    Overweight    Paroxysmal atrial fibrillation (H)    Acquired hypothyroidism    Obesity (BMI 30-39.9)    Chorioretinal scar    NYHA class 3 heart failure with preserved ejection fraction (H)    Morbid obesity (H)    Macular degeneration     Past Medical History:   Diagnosis Date    Acquired hypothyroidism 12/31/2018    Arthritis 2010    Essential hypertension, benign 2004    Macular degeneration 1980s    Paroxysmal atrial fibrillation (H) 12/31/2018    Pure hypercholesterolemia 2004    goal ldl <100       CC Mary Siddiqui, DO  [address] [place] [address] on close of this encounter.

## 2023-10-12 NOTE — LETTER
10/12/2023         RE: Sami Harmon  [address] [place] [id number]        Dear Colleague,    Thank you for referring your patient, Sami Harmon, to the [facility]. Please see a copy of my visit note below.    [facility] Dermatology Note  Encounter Date: Oct 12, 2023  Office Visit     Reviewed patients past medical history and pertinent chart review prior to patients visit today.     Dermatology Problem List:  NUB   left posterior calf, shave biopsy 10/12/23 .     Patient denies personal history of skin cancer or dysplastic nevi.    Patient denies family history of skin cancer or dysplastic nevi.      ____________________________________________    Assessment & Plan:     # Neoplasm of uncertain behavior:  left posterior calf  DDx includes pyogenic granuloma vs malignancy. Shave biopsy today.    Procedure Note: Biopsy by shave technique  The risks and benefits of the procedure were described to the patient. These include but are not limited to bleeding, infection, scar, incomplete removal, and non-diagnostic biopsy. Verbal informed consent was obtained. The above site(s) was cleansed with an alcohol pad and injected with 1% lidocaine with epinephrine. Once anesthesia was obtained, a biopsy(ies) was performed with Gilette blade. The tissue(s) was placed in a labeled container(s) with formalin and sent to pathology. Hemostasis was achieved with electrocautery. Vaseline and a pressure bandage were applied to the wound(s). The patient tolerated the procedure well and was given post biopsy care instructions.     Regi Schaffer, CNP  Dermatology    _______________________________________    CC: Derm Problem (Possible ulcer on posterior left calf, bumped site in May 2023./LE edema)    HPI:  Mr. Sami Harmon is a(n) 83 year old male who presents today as a new patient for a spot of concern behind the left calf. He thinks he injured it in May cleaning out brush  in his yard, it continues to open up and bleed without trauma. He keeps it covered with bandaids.     Patient is otherwise feeling well, without additional skin concerns.      Physical Exam:  SKIN: Focused examination of left calf lesion was performed.  - left posterior calf. 1.5 cm erythematous soft papule with active bleeding and surrounding erythema    - No other lesions of concern on areas examined.     Medications:  Current Outpatient Medications   Medication     apixaban ANTICOAGULANT (ELIQUIS ANTICOAGULANT) 5 MG tablet     Ferrous Sulfate (IRON SUPPLEMENT PO)     latanoprost (XALATAN) 0.005 % ophthalmic solution     levothyroxine (SYNTHROID/LEVOTHROID) 200 MCG tablet     losartan (COZAAR) 100 MG tablet     lovastatin (MEVACOR) 40 MG tablet     metoprolol succinate ER (TOPROL XL) 100 MG 24 hr tablet     MULTI-VITAMIN OR     Psyllium (METAMUCIL PO)     No current facility-administered medications for this visit.      Past Medical History:   Patient Active Problem List   Diagnosis     Family history of prostate cancer     Hyperlipidemia LDL goal <100     CKD (chronic kidney disease) stage 3, GFR 30-59 ml/min (H)     Advance care planning     Primary localized osteoarthrosis, pelvic region and thigh     History of colon polyps     Advanced directives, counseling/discussion     Hypertension goal BP (blood pressure) < 140/90     Overweight     Paroxysmal atrial fibrillation (H)     Acquired hypothyroidism     Obesity (BMI 30-39.9)     Chorioretinal scar     NYHA class 3 heart failure with preserved ejection fraction (H)     Morbid obesity (H)     Macular degeneration     Past Medical History:   Diagnosis Date     Acquired hypothyroidism 12/31/2018     Arthritis 2010     Essential hypertension, benign 2004     Macular degeneration 1980s     Paroxysmal atrial fibrillation (H) 12/31/2018     Pure hypercholesterolemia 2004    goal ldl <100       CC Mary Siddiqui, DO  [address] [place]  [address] on close of this encounter.       Again, thank you for allowing me to participate in the care of your patient.        Sincerely,        OUSMANE Mann CNP

## 2023-10-16 LAB
PATH REPORT.COMMENTS IMP SPEC: ABNORMAL
PATH REPORT.COMMENTS IMP SPEC: ABNORMAL
PATH REPORT.COMMENTS IMP SPEC: YES
PATH REPORT.FINAL DX SPEC: ABNORMAL
PATH REPORT.GROSS SPEC: ABNORMAL
PATH REPORT.MICROSCOPIC SPEC OTHER STN: ABNORMAL
PATH REPORT.RELEVANT HX SPEC: ABNORMAL

## 2023-10-18 ENCOUNTER — TELEPHONE (OUTPATIENT)
Dept: DERMATOLOGY | Facility: CLINIC | Age: 83
End: 2023-10-18
Payer: MEDICARE

## 2023-10-18 DIAGNOSIS — C44.91 BCC (BASAL CELL CARCINOMA OF SKIN): Primary | ICD-10-CM

## 2023-10-18 NOTE — TELEPHONE ENCOUNTER
----- Message from OUSMANE Block CNP sent at 10/18/2023 10:05 AM CDT -----  Please call patient to schedule excision with derm surgeons     We got your biopsy results back regarding the left calf. It showed a basal cell skin cancer. Basal Cell skin cancer is the most common type of skin cancer, and usually caused by years of sun exposure. This is a very slow growing skin cancer that rarely spreads elsewhere in the body. If left untreated it could slowly continue to grow over time and may cause issues with deformity or spread to the bone, therefore complete removal is recommended.  I will refer you to one of our surgeons for a surgical excision. One of their nurses will reach out to you to schedule this shortly. Let me know if you have any other questions.     Ways to reduce your risk of more skin cancer are to wear clothing and sunscreen when outdoors, and using a daily facial moisturizer with at least SPF 30 or higher on a daily basis on your face, ears and neck. Some examples of these are Cera Ve AM facial moisturizing lotion, Eucerin daily protectant facial lotion, La Roche Possay ultra light sunscreen fluid but there are many option at drug stores, [employer] etc.     Regi Schaffer CNP  Dermatology   Written by OUSMANE Block CNP on 10/18/2023 10:05 AM CDT  Seen by patient Khadar Harmon on 10/18/2023 10:06 AM

## 2023-10-18 NOTE — TELEPHONE ENCOUNTER
Patient Contact    Attempt # 1    Was call answered?  No.  No voicemail and not able to leave message.  Will try calling at a later time.    Carole TORRES RN  [facility] [place]  [phone]         bilateral upper extremity ROM was WFL (within functional limits)/bilateral lower extremity ROM was WFL (within functional limits)/with c/o pain in the left hip

## 2023-10-19 NOTE — TELEPHONE ENCOUNTER
Spoke to patient and relayed provider Regi Schaffer CNP note below regarding BCC to left calf. Patient had read PLAXD message and had no additional questions.    Patient requesting Mohs procedure to be at [facility], referral to be placed.    Magdalene ARIAS

## 2023-10-19 NOTE — TELEPHONE ENCOUNTER
Derm surg referral placed.     Bertha MATIAS RN BSN  [facility]at[facility]  [phone]

## 2023-10-24 ENCOUNTER — TELEPHONE (OUTPATIENT)
Dept: DERMATOLOGY | Facility: CLINIC | Age: 83
End: 2023-10-24
Payer: MEDICARE

## 2023-10-24 NOTE — TELEPHONE ENCOUNTER
Left vm that pt was added to the cancellation list for a sooner appt.     Nataliya Edwards, Procedure  10/24/2023 12:16 PM

## 2023-10-24 NOTE — TELEPHONE ENCOUNTER
[facility] Call Center    Phone Message    May a detailed message be left on voicemail: yes     Reason for Call: Other: Pt would like sooner appt with any Derm surgeon at any location. Please call to advise if possible.     Action Taken: Message routed to:  Other:  Derm    Travel Screening: Not Applicable

## 2023-10-24 NOTE — TELEPHONE ENCOUNTER
Called patient to schedule surgery with Dr. Dang    Date of Surgery: 12/27    Surgery type: excision    Consult scheduled: No    Has patient had mohs with us before? Not Applicable    Additional comments: opal Edwards on 10/24/2023 at 8:46 AM

## 2023-10-25 NOTE — TELEPHONE ENCOUNTER
Spoke to patient who accepted a Mohs surgery appt 10-30-23.Scheduled for [facility] Surgery. Mohs Pre-op letter sent via My chart.       Aggie Turner RN

## 2023-10-25 NOTE — PROGRESS NOTES
Surgical Office Location :   [facility]  [address] [place] [address]

## 2023-10-30 ENCOUNTER — OFFICE VISIT (OUTPATIENT)
Dept: DERMATOLOGY | Facility: CLINIC | Age: 83
End: 2023-10-30
Payer: MEDICARE

## 2023-10-30 DIAGNOSIS — L81.4 LENTIGO: ICD-10-CM

## 2023-10-30 DIAGNOSIS — D23.9 DERMAL NEVUS: ICD-10-CM

## 2023-10-30 DIAGNOSIS — D18.01 ANGIOMA OF SKIN: ICD-10-CM

## 2023-10-30 DIAGNOSIS — L82.1 SEBORRHEIC KERATOSES: ICD-10-CM

## 2023-10-30 DIAGNOSIS — C44.719 BASAL CELL CARCINOMA (BCC) OF LEFT LOWER LEG: Primary | ICD-10-CM

## 2023-10-30 PROCEDURE — 99203 OFFICE O/P NEW LOW 30 MIN: CPT | Mod: 25 | Performed by: DERMATOLOGY

## 2023-10-30 PROCEDURE — 17313 MOHS 1 STAGE T/A/L: CPT | Performed by: DERMATOLOGY

## 2023-10-30 NOTE — LETTER
10/30/2023         RE: Sami Harmon  [address] [facility] [id number]        Dear Colleague,    Thank you for referring your patient, Sami Harmon, to the [facility]. Please see a copy of my visit note below.    Surgical Office Location :   [facility]  [address] [place] [address]      Sami Harmon , a 83 year old year old male patient, I was asked to see by ANA Whitehead for basal cell carcinoma on left leg.  Patient has no other skin complaints today.  Remainder of the HPI, Meds, PMH, Allergies, FH, and SH was reviewed in chart.      Past Medical History:   Diagnosis Date     Acquired hypothyroidism 12/31/2018     Arthritis 2010     Essential hypertension, benign 2004     Macular degeneration 1980s     Paroxysmal atrial fibrillation (H) 12/31/2018     Pure hypercholesterolemia 2004    goal ldl <100       Past Surgical History:   Procedure Laterality Date     ABDOMEN SURGERY  1970's, 1980's    Two hernia surgerys     BIOPSY  July 2016    polyp removed from vocal cord - benign     COLONOSCOPY       COSMETIC SURGERY       EYE SURGERY  2000    Macular scar, presently being treated with avastin     HERNIA REPAIR, INGUINAL RT/LT  1970,1980     ORTHOPEDIC SURGERY  April 2015, June 2015    Right hip replacement, Left hip replacement     SURGICAL HISTORY OF -   2000    cholesteatoma removal L     SURGICAL HISTORY OF -   3/2004    laser macular     SURGICAL HISTORY OF -   4-2015    right hip replacement     TONSILLECTOMY & ADENOIDECTOMY  1961        Family History   Problem Relation Age of Onset     Arthritis Mother      Eye Disorder Mother      Diabetes Mother         type 2     Hypertension Mother      Cancer - colorectal Mother      Cerebrovascular Disease Mother      Osteoporosis Mother      Obesity Mother      Colon Cancer Mother      Heart Disease Father      Prostate Cancer Father      Cerebrovascular Disease Father      Heart Disease Brother       Prostate Cancer Brother      Coronary Artery Disease Brother      Gastrointestinal Disease Son      Prostate Cancer Brother        Social History     Socioeconomic History     Marital status:      Spouse name: Meredith     Number of children: 4     Years of education: Not on file     Highest education level: Not on file   Occupational History     Occupation:      Employer: RETIRED   Tobacco Use     Smoking status: Never     Smokeless tobacco: Former     Types: Chew     Quit date: 12/12/2000     Tobacco comments:     Chewed tobacco for 30 years   Vaping Use     Vaping Use: Never used   Substance and Sexual Activity     Alcohol use: Yes     Comment: 1 or less drink per day     Drug use: No     Sexual activity: Not Currently     Partners: Female   Other Topics Concern     Parent/sibling w/ CABG, MI or angioplasty before 65F 55M? No      Service No     Blood Transfusions No     Caffeine Concern No     Occupational Exposure No     Hobby Hazards No     Sleep Concern No     Stress Concern No     Weight Concern No     Special Diet No     Back Care No     Exercise No     Bike Helmet No     Seat Belt Yes     Self-Exams Yes   Social History Narrative     Not on file     Social Determinants of Health     Financial Resource Strain: Not on file   Food Insecurity: Not on file   Transportation Needs: Not on file   Physical Activity: Not on file   Stress: Not on file   Social Connections: Not on file   Interpersonal Safety: Not on file   Housing Stability: Not on file       Outpatient Encounter Medications as of 10/30/2023   Medication Sig Dispense Refill     apixaban ANTICOAGULANT (ELIQUIS ANTICOAGULANT) 5 MG tablet Take 1 tablet (5 mg) by mouth 2 times daily 180 tablet 3     Ferrous Sulfate (IRON SUPPLEMENT PO) Take by mouth daily        latanoprost (XALATAN) 0.005 % ophthalmic solution Place 1 drop into both eyes daily       levothyroxine (SYNTHROID/LEVOTHROID) 200 MCG tablet Take 1 tablet (200 mcg) by  mouth daily 90 tablet 3     losartan (COZAAR) 100 MG tablet Take 1 tablet (100 mg) by mouth daily 90 tablet 3     lovastatin (MEVACOR) 40 MG tablet Take 1 tablet (40 mg) by mouth At Bedtime 90 tablet 3     metoprolol succinate ER (TOPROL XL) 100 MG 24 hr tablet Take 1.5 tablets (150 mg) by mouth daily 135 tablet 3     MULTI-VITAMIN OR 1 tablets  daily       Psyllium (METAMUCIL PO) Take by mouth daily 1 teaspoon added to 1 glass of water once daily       No facility-administered encounter medications on file as of 10/30/2023.             Review Of Systems  Skin: As above  Eyes: negative  Ears/Nose/Throat: negative  Respiratory: No shortness of breath, dyspnea on exertion, cough, or hemoptysis  Cardiovascular: negative  Gastrointestinal: negative  Genitourinary: negative  Musculoskeletal: negative  Neurologic: negative  Psychiatric: negative  Hematologic/Lymphatic/Immunologic: negative  Endocrine: negative      O:   NAD, WDWN, Alert & Oriented, Mood & Affect wnl, Vitals stable   Alert, oriented and in no acute distress     L inferior calf 1.5cm scaly red plaque   Stuck on papules and brown macules on trunk and ext    Red papules on trunk   Flesh colored papules on trunk          Eyes: Conjunctivae/lids:Normal     ENT: Lips, buccal mucosa, tongue: normal    MSK:Normal    Cardiovascular: peripheral edema 1+     Pulm: Breathing Normal    Neuro/Psych: Orientation:Normal; Mood/Affect:Normal      A/P:  1. Seborrheic keratosis, lentigo, angioma, dermal nevus  2. Basal cell carcinoma L calf   It was a pleasure speaking to Sami Harmon today.  Previous clinic  notes and pertinent laboratory tests were reviewed prior to Sami Harmon's visit.  Signs and Symptoms of skin cancer discussed with patient.  Patient encouraged to perform monthly skin exams.  UV precautions reviewed with patient.  Risks of non-melanoma skin cancer discussed with patient   Return to clinic 1 month  PROCEDURE NOTE  L calf basal cell carcinoma   MOHS:    Location    The rationale for Mohs surgery was discussed with the patient and consent was obtained.  The risks and benefits as well as alternatives to therapy were discussed, in detail.  Specifically, the risks of infection, scarring, bleeding, prolonged wound healing, incomplete removal, allergy to anesthesia, nerve injury and recurrence were addressed.  Indication for Mohs was Location. Prior to the procedure, the treatment site was clearly identified and, if available, confirmed with previous photos and confirmed by the patient   All components of the Universal Protocol/PAUSE rule were completed.  The Mohs surgeon operated in two distinct and integrated capacities as the surgeon and pathologist.      The area was prepped with Betasept.  A rim of normal appearing skin was marked circumferentially around the lesion.  The area was infiltrated with local anesthesia.  The tumor was first debulked to remove all clinically apparent tumor.  An incision following the standard Mohs approach was done and the specimen was oriented,mapped and placed in 1 block(s).  Each specimen was then chromacoded and processed in the Mohs laboratory using standard Mohs technique and submitted for frozen section histology.  Frozen section analysis showed no residual tumor but CLEAR MARGINS.      The tumor was excised using standard Mohs technique in 1 stages(s).  CLEAR MARGINS OBTAINED and Final defect size was 2.3 cm.     We discussed the options for wound management in full with the patient including risks/benefits/ possible outcomes.      REPAIR SECOND INTENT: We discussed the options for wound management in full with the patient including risks/benefits/possible outcomes. Decision made to allow the wound to heal by second intention. Cautery was used for for hemostasis. EBL minimal; complications none; wound care routine.  The patient was discharged in good condition and will return in one month or prn for wound evaluation.      Again,  thank you for allowing me to participate in the care of your patient.        Sincerely,        Rich Bae MD

## 2023-10-30 NOTE — PATIENT INSTRUCTIONS
Open Wound Care     for __Left Calf____________        No strenuous activity for 48 hours    Take Tylenol as needed for discomfort.                                                .         Do not drink alcoholic beverages for 48 hours.    Keep the pressure bandage in place for 24 hours. If the bandage becomes blood tinged or loose, reinforce it with gauze and tape.        (Refer to the reverse side of this page for management of bleeding).    Remove bandage in 24 hours and begin wound care as follows:     Clean area with tap water using a Q tip or gauze pad, (shower / bathe normally)  Dry wound with Q tip or gauze pad  Apply Aquaphor, Vaseline, Polysporin or Bacitracin Ointment with a Q tip  Do NOT use Neosporin Ointment *  Cover the wound with a band-aid or nonstick gauze pad and paper tape.  Repeat wound care once a day until wound is completely healed.    It is an old wives tale that a wound heals better when it is exposed to air and allowed to dry out. The wound will heal faster with a better cosmetic result if it is kept moist with ointment and covered with a bandage.  Do not let the wound dry out.      Supplies Needed:                Qtips or gauze pads                Polysporin or Bacitracin Ointment                Bandaids or nonstick gauze pads and paper tape    Wound care kits and brown paper tape are available for purchase at   the pharmacy.    BLEEDING:    Use tightly rolled up gauze or cloth to apply direct pressure over the bandage for 20   minutes.  Reapply pressure for an additional 20 minutes if necessary  Call the office or go to the nearest emergency room if pressure fails to stop the bleeding.  Use additional gauze and tape to maintain pressure once the bleeding has stopped.  Begin wound care 24 hours after surgery as directed.                  WOUND HEALING    One week after surgery a pink / red halo will form around the outside of the wound.   This is new skin.  The center of the wound will  appear yellowish white and produce some drainage.  The pink halo will slowly migrate in toward the center of the wound until the wound is covered with new shiny pink skin.  There will be no more drainage when the wound is completely healed.  It will take six months to one year for the redness to fade.  The scar may be itchy, tight and sensitive to extreme temperatures for a year after the surgery.  Massaging the area several times a day for several minutes after the wound is completely healed will help the scar soften and normalize faster. Begin massage only after healing is complete.      In case of emergency call: Dr Bae: [phone]    [facility]: [phone]    [facility]:[phone]

## 2023-10-30 NOTE — PROGRESS NOTES
Sami Harmon , a 83 year old year old male patient, I was asked to see by ANA Whitehead for basal cell carcinoma on left leg.  Patient has no other skin complaints today.  Remainder of the HPI, Meds, PMH, Allergies, FH, and SH was reviewed in chart.      Past Medical History:   Diagnosis Date    Acquired hypothyroidism 12/31/2018    Arthritis 2010    Essential hypertension, benign 2004    Macular degeneration 1980s    Paroxysmal atrial fibrillation (H) 12/31/2018    Pure hypercholesterolemia 2004    goal ldl <100       Past Surgical History:   Procedure Laterality Date    ABDOMEN SURGERY  1970's, 1980's    Two hernia surgerys    BIOPSY  July 2016    polyp removed from vocal cord - benign    COLONOSCOPY      COSMETIC SURGERY      EYE SURGERY  2000    Macular scar, presently being treated with avastin    HERNIA REPAIR, INGUINAL RT/LT  1970,1980    ORTHOPEDIC SURGERY  April 2015, June 2015    Right hip replacement, Left hip replacement    SURGICAL HISTORY OF -   2000    cholesteatoma removal L    SURGICAL HISTORY OF -   3/2004    laser macular    SURGICAL HISTORY OF -   4-2015    right hip replacement    TONSILLECTOMY & ADENOIDECTOMY  1961        Family History   Problem Relation Age of Onset    Arthritis Mother     Eye Disorder Mother     Diabetes Mother         type 2    Hypertension Mother     Cancer - colorectal Mother     Cerebrovascular Disease Mother     Osteoporosis Mother     Obesity Mother     Colon Cancer Mother     Heart Disease Father     Prostate Cancer Father     Cerebrovascular Disease Father     Heart Disease Brother     Prostate Cancer Brother     Coronary Artery Disease Brother     Gastrointestinal Disease Son     Prostate Cancer Brother        Social History     Socioeconomic History    Marital status:      Spouse name: Meredith    Number of children: 4    Years of education: Not on file    Highest education level: Not on file   Occupational History    Occupation:      Employer: RETIRED    Tobacco Use    Smoking status: Never    Smokeless tobacco: Former     Types: Chew     Quit date: 12/12/2000    Tobacco comments:     Chewed tobacco for 30 years   Vaping Use    Vaping Use: Never used   Substance and Sexual Activity    Alcohol use: Yes     Comment: 1 or less drink per day    Drug use: No    Sexual activity: Not Currently     Partners: Female   Other Topics Concern    Parent/sibling w/ CABG, MI or angioplasty before 65F 55M? No     Service No    Blood Transfusions No    Caffeine Concern No    Occupational Exposure No    Hobby Hazards No    Sleep Concern No    Stress Concern No    Weight Concern No    Special Diet No    Back Care No    Exercise No    Bike Helmet No    Seat Belt Yes    Self-Exams Yes   Social History Narrative    Not on file     Social Determinants of Health     Financial Resource Strain: Not on file   Food Insecurity: Not on file   Transportation Needs: Not on file   Physical Activity: Not on file   Stress: Not on file   Social Connections: Not on file   Interpersonal Safety: Not on file   Housing Stability: Not on file       Outpatient Encounter Medications as of 10/30/2023   Medication Sig Dispense Refill    apixaban ANTICOAGULANT (ELIQUIS ANTICOAGULANT) 5 MG tablet Take 1 tablet (5 mg) by mouth 2 times daily 180 tablet 3    Ferrous Sulfate (IRON SUPPLEMENT PO) Take by mouth daily       latanoprost (XALATAN) 0.005 % ophthalmic solution Place 1 drop into both eyes daily      levothyroxine (SYNTHROID/LEVOTHROID) 200 MCG tablet Take 1 tablet (200 mcg) by mouth daily 90 tablet 3    losartan (COZAAR) 100 MG tablet Take 1 tablet (100 mg) by mouth daily 90 tablet 3    lovastatin (MEVACOR) 40 MG tablet Take 1 tablet (40 mg) by mouth At Bedtime 90 tablet 3    metoprolol succinate ER (TOPROL XL) 100 MG 24 hr tablet Take 1.5 tablets (150 mg) by mouth daily 135 tablet 3    MULTI-VITAMIN OR 1 tablets  daily      Psyllium (METAMUCIL PO) Take by mouth daily 1 teaspoon added to 1 glass of  water once daily       No facility-administered encounter medications on file as of 10/30/2023.             Review Of Systems  Skin: As above  Eyes: negative  Ears/Nose/Throat: negative  Respiratory: No shortness of breath, dyspnea on exertion, cough, or hemoptysis  Cardiovascular: negative  Gastrointestinal: negative  Genitourinary: negative  Musculoskeletal: negative  Neurologic: negative  Psychiatric: negative  Hematologic/Lymphatic/Immunologic: negative  Endocrine: negative      O:   NAD, WDWN, Alert & Oriented, Mood & Affect wnl, Vitals stable   Alert, oriented and in no acute distress     L inferior calf 1.5cm scaly red plaque   Stuck on papules and brown macules on trunk and ext    Red papules on trunk   Flesh colored papules on trunk          Eyes: Conjunctivae/lids:Normal     ENT: Lips, buccal mucosa, tongue: normal    MSK:Normal    Cardiovascular: peripheral edema 1+     Pulm: Breathing Normal    Neuro/Psych: Orientation:Normal; Mood/Affect:Normal      A/P:  1. Seborrheic keratosis, lentigo, angioma, dermal nevus  2. Basal cell carcinoma L calf   It was a pleasure speaking to Sami Harmon today.  Previous clinic  notes and pertinent laboratory tests were reviewed prior to Sami Harmon's visit.  Signs and Symptoms of skin cancer discussed with patient.  Patient encouraged to perform monthly skin exams.  UV precautions reviewed with patient.  Risks of non-melanoma skin cancer discussed with patient   Return to clinic 1 month  PROCEDURE NOTE  L calf basal cell carcinoma   MOHS:   Location    The rationale for Mohs surgery was discussed with the patient and consent was obtained.  The risks and benefits as well as alternatives to therapy were discussed, in detail.  Specifically, the risks of infection, scarring, bleeding, prolonged wound healing, incomplete removal, allergy to anesthesia, nerve injury and recurrence were addressed.  Indication for Mohs was Location. Prior to the procedure, the treatment  site was clearly identified and, if available, confirmed with previous photos and confirmed by the patient   All components of the Universal Protocol/PAUSE rule were completed.  The Mohs surgeon operated in two distinct and integrated capacities as the surgeon and pathologist.      The area was prepped with Betasept.  A rim of normal appearing skin was marked circumferentially around the lesion.  The area was infiltrated with local anesthesia.  The tumor was first debulked to remove all clinically apparent tumor.  An incision following the standard Mohs approach was done and the specimen was oriented,mapped and placed in 1 block(s).  Each specimen was then chromacoded and processed in the Mohs laboratory using standard Mohs technique and submitted for frozen section histology.  Frozen section analysis showed no residual tumor but CLEAR MARGINS.      The tumor was excised using standard Mohs technique in 1 stages(s).  CLEAR MARGINS OBTAINED and Final defect size was 2.3 cm.     We discussed the options for wound management in full with the patient including risks/benefits/ possible outcomes.      REPAIR SECOND INTENT: We discussed the options for wound management in full with the patient including risks/benefits/possible outcomes. Decision made to allow the wound to heal by second intention. Cautery was used for for hemostasis. EBL minimal; complications none; wound care routine.  The patient was discharged in good condition and will return in one month or prn for wound evaluation.

## 2024-01-04 ENCOUNTER — TELEPHONE (OUTPATIENT)
Dept: CARDIOLOGY | Facility: CLINIC | Age: 84
End: 2024-01-04
Payer: MEDICARE

## 2024-01-04 NOTE — TELEPHONE ENCOUNTER
1/4 spoke to pt and scheduled pt appt for Ad[facility]   Pt requested to be seen sooner since pt is due to be seen in March and wants an ECHO done   Adv pt will notify the nurse to add the Echo orders  Adv pt that the nurse will put in refill orders   Pt stated he called in Dec to schedule his appt w/ Adryan and was told the schedule was not out yet   Adv pt will schedule the appt w/ Adryan and add pt to the waitlist   Pt thanked   Eoc

## 2024-01-08 ENCOUNTER — MYC MEDICAL ADVICE (OUTPATIENT)
Dept: FAMILY MEDICINE | Facility: CLINIC | Age: 84
End: 2024-01-08
Payer: MEDICARE

## 2024-01-09 ENCOUNTER — NURSE TRIAGE (OUTPATIENT)
Dept: FAMILY MEDICINE | Facility: CLINIC | Age: 84
End: 2024-01-09
Payer: MEDICARE

## 2024-01-09 ENCOUNTER — VIRTUAL VISIT (OUTPATIENT)
Dept: FAMILY MEDICINE | Facility: CLINIC | Age: 84
End: 2024-01-09
Payer: MEDICARE

## 2024-01-09 DIAGNOSIS — N18.30 STAGE 3 CHRONIC KIDNEY DISEASE, UNSPECIFIED WHETHER STAGE 3A OR 3B CKD (H): ICD-10-CM

## 2024-01-09 DIAGNOSIS — E78.5 HYPERLIPIDEMIA LDL GOAL <100: ICD-10-CM

## 2024-01-09 DIAGNOSIS — U07.1 INFECTION DUE TO 2019 NOVEL CORONAVIRUS: Primary | ICD-10-CM

## 2024-01-09 DIAGNOSIS — I48.0 PAROXYSMAL ATRIAL FIBRILLATION (H): ICD-10-CM

## 2024-01-09 PROCEDURE — 99214 OFFICE O/P EST MOD 30 MIN: CPT | Mod: 95 | Performed by: FAMILY MEDICINE

## 2024-01-09 NOTE — CONFIDENTIAL NOTE
RN COVID TREATMENT VISIT  01/09/24    The patient has been triaged and does not require a higher level of care.    Sami Harmon  83 year old  Current weight? 260 lb    Has the patient been seen by a primary care provider at an [facility] within the past two years? Yes.   Have you been in close proximity to/do you have a known exposure to a person with a confirmed case of influenza? No.     General treatment eligibility:  Date of positive COVID test (PCR or at home)?  1/8/24    Are you or have you been hospitalized for this COVID-19 infection? No.   Have you received monoclonal antibodies or antiviral treatment for COVID-19 since this positive test? No.   Do you have any of the following conditions that place you at risk of being very sick from COVID-19?   - Age 50 years or older  Yes, patient has at least one high risk condition as noted above.     Current COVID symptoms:   - cough  - congestion or runny nose  Yes. Patient has at least one symptom as selected.     How many days since symptoms started? 5 days or less. Established patient, 12 years or older weighing at least 88.2 lbs, who has symptoms that started in the past 5 days, has not been hospitalized nor received treatment already, and is at risk for being very sick from COVID-19.     Treatment eligibility by RN:  Are you currently pregnant or nursing? No  Do you have a clinically significant hypersensitivity to nirmatrelvir or ritonavir, or toxic epidermal necrolysis (TEN) or Wyatt-Miguelangel Syndrome? No  Do you have a history of hepatitis, any hepatic impairment on the Problem List (such as Child-Eddy Class C, cirrhosis, fatty liver disease, alcoholic liver disease), or was the last liver lab (hepatic panel, ALT, AST, ALK Phos, bilirubin) elevated in the past 6 months? No  Do you have any history of severe renal impairment (eGFR < 30mL/min)? No    Is patient eligible to continue? Yes, patient meets all  eligibility requirements for the RN COVID treatment (as denoted by all no responses above).     Current Outpatient Medications   Medication Sig Dispense Refill    apixaban ANTICOAGULANT (ELIQUIS ANTICOAGULANT) 5 MG tablet Take 1 tablet (5 mg) by mouth 2 times daily 180 tablet 3    Ferrous Sulfate (IRON SUPPLEMENT PO) Take by mouth daily       latanoprost (XALATAN) 0.005 % ophthalmic solution Place 1 drop into both eyes daily      levothyroxine (SYNTHROID/LEVOTHROID) 200 MCG tablet Take 1 tablet (200 mcg) by mouth daily 90 tablet 3    losartan (COZAAR) 100 MG tablet Take 1 tablet (100 mg) by mouth daily 90 tablet 3    lovastatin (MEVACOR) 40 MG tablet Take 1 tablet (40 mg) by mouth At Bedtime 90 tablet 3    metoprolol succinate ER (TOPROL XL) 100 MG 24 hr tablet Take 1.5 tablets (150 mg) by mouth daily 135 tablet 3    MULTI-VITAMIN OR 1 tablets  daily      Psyllium (METAMUCIL PO) Take by mouth daily 1 teaspoon added to 1 glass of water once daily         Medications from List 1 of the standing order (on medications that exclude the use of Paxlovid) that patient is taking: NONE. Is patient taking [place] Wort? No  Is patient taking [place] Wort or any meds from List 1? No.   Medications from List 2 of the standing order (on meds that provider needs to adjust) that patient is taking: apixaban (Eliquis), explained a provider visit is necessary to discuss medication adjustments while taking Paxlovid. Is patient on any of the meds from List 2? Yes. Patient will be scheduled or transferred to a  at the end of this call.   Myriam Villarreal RN

## 2024-01-09 NOTE — ASSESSMENT & PLAN NOTE
Pafib, on eliquis.   Needs paxlovid due to current covid infection.   Cautioned about increased bleeding risk and to call if any concerns right away.

## 2024-01-09 NOTE — TELEPHONE ENCOUNTER
Reason for Disposition    HIGH RISK patient (e.g., weak immune system, age > 64 years, obesity with BMI of 30 or higher, pregnant, chronic lung disease or other chronic medical condition) and COVID symptoms (e.g., cough, fever)  (Exceptions: Already seen by doctor or NP/PA and no new or worsening symptoms.)    Additional Information    Negative: SEVERE difficulty breathing (e.g., struggling for each breath, speaks in single words)    Negative: Difficult to awaken or acting confused (e.g., disoriented, slurred speech)    Negative: Bluish (or gray) lips or face now    Negative: Shock suspected (e.g., cold/pale/clammy skin, too weak to stand, low BP, rapid pulse)    Negative: Sounds like a life-threatening emergency to the triager    Negative: SEVERE or constant chest pain or pressure  (Exception: Mild central chest pain, present only when coughing.)    Negative: MODERATE difficulty breathing (e.g., speaks in phrases, SOB even at rest, pulse 100-120)    Negative: Headache and stiff neck (can't touch chin to chest)    Negative: Chest pain or pressure  (Exception: MILD central chest pain, present only when coughing.)    Negative: Drinking very little and dehydration suspected (e.g., no urine > 12 hours, very dry mouth, very lightheaded)    Negative: Patient sounds very sick or weak to the triager    Negative: MILD difficulty breathing (e.g., minimal/no SOB at rest, SOB with walking, pulse <100)    Negative: Fever > 103 F (39.4 C)    Negative: Fever > 101 F (38.3 C) and over 60 years of age    Negative: Fever > 100.0 F (37.8 C) and bedridden (e.g., CVA, chronic illness, recovering from surgery)    Protocols used: Coronavirus (COVID-19) Diagnosed or [id number]

## 2024-01-09 NOTE — PROGRESS NOTES
"Khadar is a 83 year old who is being evaluated via a billable video visit.      How would you like to obtain your AVS? MyChart  If the video visit is dropped, the invitation should be resent by: Text to cell phone: [phone]  Will anyone else be joining your video visit? No    Assessment & Plan   Problem List Items Addressed This Visit          Endocrine    Hyperlipidemia LDL goal <100     Hold lovastatin while on paxlovid.             Circulatory    Paroxysmal atrial fibrillation (H)     Pafib, on eliquis.   Needs paxlovid due to current covid infection.   Cautioned about increased bleeding risk and to call if any concerns right away.            Urinary    CKD (chronic kidney disease) stage 3, GFR 30-59 ml/min (H)     Paxlovid dose adjusted lower.          Other Visit Diagnoses       Infection due to 2019 novel coronavirus    -  Primary    Relevant Medications    nirmatrelvir and ritonavir (PAXLOVID) 150 mg/100 mg therapy pack              BMI:   Estimated body mass index is 36.18 kg/m  as calculated from the following:    Height as of 7/26/23: 1.807 m (5' 11.16\").    Weight as of 7/26/23: 118.2 kg (260 lb 9.6 oz).           Liberty Parsons MD  [facility] HospitalASHLY Rubalcava is a 83 year old, presenting for the following health issues:  covid treatment        1/9/2024     2:38 PM   Additional Questions   Roomed by as   Accompanied by self         1/9/2024     2:38 PM   Patient Reported Additional Medications   Patient reports taking the following new medications no       History of Present Illness       Reason for visit:  Positive covid test  Symptom onset:  1-3 days ago  Symptoms include:  I Cough a lot, runny nose - mucous. Slight fever (100.1) yesterday; no fever at present. Symptoms are mild except for cough.  Symptom intensity:  Mild  Symptom progression:  Staying the same  Had these symptoms before:  Yes  Has tried/received treatment for these symptoms:  No  What makes it worse:  " ?  What makes it better:  ?    He eats 2-3 servings of fruits and vegetables daily.He consumes 0 sweetened beverage(s) daily.He exercises with enough effort to increase his heart rate 20 to 29 minutes per day.  He exercises with enough effort to increase his heart rate 4 days per week.   He is taking medications regularly.                 Objective    Vitals - Patient Reported  Temperature (Patient Reported): 97  F (36.1  C)        Physical Exam   GENERAL: Healthy, alert and no distress  EYES: Eyes grossly normal to inspection.  No discharge or erythema, or obvious scleral/conjunctival abnormalities.  RESP: No audible wheeze, cough, or visible cyanosis.  No visible retractions or increased work of breathing.    SKIN: Visible skin clear. No significant rash, abnormal pigmentation or lesions.  NEURO: Cranial nerves grossly intact.  Mentation and speech appropriate for age.  PSYCH: Mentation appears normal, affect normal/bright, judgement and insight intact, normal speech and appearance well-groomed.                Video-Visit Details    Type of service:  Video Visit   Video Start Time: 3:13  Video End Time:3:22 PM    Originating Location (pt. Location): Home    Distant Location (provider location):  On-site  Platform used for Video Visit: [email or web address]

## 2024-02-08 ENCOUNTER — MYC MEDICAL ADVICE (OUTPATIENT)
Dept: FAMILY MEDICINE | Facility: CLINIC | Age: 84
End: 2024-02-08
Payer: MEDICARE

## 2024-02-08 DIAGNOSIS — Z00.00 ANNUAL PHYSICAL EXAM: Primary | ICD-10-CM

## 2024-02-08 DIAGNOSIS — Z12.5 SCREENING FOR PROSTATE CANCER: ICD-10-CM

## 2024-02-08 NOTE — TELEPHONE ENCOUNTER
Routing My Chart message to PCP    Patient has lab appointment 2/29 for labs from another provider.    Annual wellness scheduled 3/8.    Appears lipid and PSA due.  Pended if agreeable    Any additional labs needed?    Obi Ochoa RN, BSN, PHN  [facility]

## 2024-02-26 ENCOUNTER — MYC MEDICAL ADVICE (OUTPATIENT)
Dept: FAMILY MEDICINE | Facility: CLINIC | Age: 84
End: 2024-02-26
Payer: MEDICARE

## 2024-02-26 ENCOUNTER — MYC REFILL (OUTPATIENT)
Dept: FAMILY MEDICINE | Facility: CLINIC | Age: 84
End: 2024-02-26
Payer: MEDICARE

## 2024-02-26 ENCOUNTER — MYC REFILL (OUTPATIENT)
Dept: CARDIOLOGY | Facility: CLINIC | Age: 84
End: 2024-02-26
Payer: MEDICARE

## 2024-02-26 DIAGNOSIS — I10 HYPERTENSION GOAL BP (BLOOD PRESSURE) < 140/90: ICD-10-CM

## 2024-02-26 DIAGNOSIS — E03.9 ACQUIRED HYPOTHYROIDISM: ICD-10-CM

## 2024-02-26 DIAGNOSIS — I48.0 PAROXYSMAL ATRIAL FIBRILLATION (H): ICD-10-CM

## 2024-02-26 DIAGNOSIS — E78.2 MIXED HYPERLIPIDEMIA: ICD-10-CM

## 2024-02-26 DIAGNOSIS — I10 BENIGN ESSENTIAL HYPERTENSION: ICD-10-CM

## 2024-02-26 DIAGNOSIS — H35.30 MACULAR DEGENERATION OF BOTH EYES, UNSPECIFIED TYPE: Primary | ICD-10-CM

## 2024-02-26 DIAGNOSIS — E78.5 HYPERLIPIDEMIA LDL GOAL <100: ICD-10-CM

## 2024-02-26 RX ORDER — LEVOTHYROXINE SODIUM 200 UG/1
200 TABLET ORAL DAILY
Qty: 90 TABLET | Refills: 0 | Status: SHIPPED | OUTPATIENT
Start: 2024-02-26 | End: 2024-03-08

## 2024-02-26 RX ORDER — METOPROLOL SUCCINATE 100 MG/1
150 TABLET, EXTENDED RELEASE ORAL DAILY
Qty: 135 TABLET | Refills: 0 | Status: SHIPPED | OUTPATIENT
Start: 2024-02-26 | End: 2024-04-01

## 2024-02-26 RX ORDER — LOSARTAN POTASSIUM 100 MG/1
100 TABLET ORAL DAILY
Qty: 90 TABLET | Refills: 0 | Status: SHIPPED | OUTPATIENT
Start: 2024-02-26 | End: 2024-03-08

## 2024-02-26 RX ORDER — LOVASTATIN 40 MG
40 TABLET ORAL AT BEDTIME
Qty: 90 TABLET | Refills: 0 | Status: SHIPPED | OUTPATIENT
Start: 2024-02-26 | End: 2024-03-08

## 2024-02-26 RX ORDER — LATANOPROST 50 UG/ML
1 SOLUTION/ DROPS OPHTHALMIC DAILY
Qty: 7.5 ML | Refills: 3 | Status: SHIPPED | OUTPATIENT
Start: 2024-02-26 | End: 2024-02-27

## 2024-02-26 NOTE — TELEPHONE ENCOUNTER
Last Clinic Visit: 3/3/2023 [facility]  Future Visit: 6/25/2024        metoprolol succinate ER (TOPROL XL) 100 MG 24 hr tablet: passed protocol  - refill sent

## 2024-02-27 RX ORDER — LATANOPROST 50 UG/ML
1 SOLUTION/ DROPS OPHTHALMIC DAILY
Qty: 7.5 ML | Refills: 3 | Status: SHIPPED | OUTPATIENT
Start: 2024-02-27

## 2024-02-29 ENCOUNTER — LAB (OUTPATIENT)
Dept: LAB | Facility: CLINIC | Age: 84
End: 2024-02-29
Payer: MEDICARE

## 2024-02-29 DIAGNOSIS — Z12.5 SCREENING FOR PROSTATE CANCER: ICD-10-CM

## 2024-02-29 DIAGNOSIS — Z00.00 ANNUAL PHYSICAL EXAM: ICD-10-CM

## 2024-02-29 DIAGNOSIS — N18.30 CKD (CHRONIC KIDNEY DISEASE) STAGE 3, GFR 30-59 ML/MIN (H): Primary | ICD-10-CM

## 2024-02-29 LAB
ALBUMIN SERPL BCG-MCNC: 4.1 G/DL (ref 3.5–5.2)
ALP SERPL-CCNC: 80 U/L (ref 40–150)
ALT SERPL W P-5'-P-CCNC: 42 U/L (ref 0–70)
ANION GAP SERPL CALCULATED.3IONS-SCNC: 9 MMOL/L (ref 7–15)
AST SERPL W P-5'-P-CCNC: 42 U/L (ref 0–45)
BILIRUB SERPL-MCNC: 1 MG/DL
BUN SERPL-MCNC: 16.5 MG/DL (ref 8–23)
CALCIUM SERPL-MCNC: 9.5 MG/DL (ref 8.8–10.2)
CHLORIDE SERPL-SCNC: 101 MMOL/L (ref 98–107)
CHOLEST SERPL-MCNC: 138 MG/DL
CREAT SERPL-MCNC: 1.34 MG/DL (ref 0.67–1.17)
CREAT UR-MCNC: 82.6 MG/DL
DEPRECATED HCO3 PLAS-SCNC: 26 MMOL/L (ref 22–29)
EGFRCR SERPLBLD CKD-EPI 2021: 52 ML/MIN/1.73M2
FASTING STATUS PATIENT QL REPORTED: YES
GLUCOSE SERPL-MCNC: 99 MG/DL (ref 70–99)
HDLC SERPL-MCNC: 52 MG/DL
LDLC SERPL CALC-MCNC: 72 MG/DL
MICROALBUMIN UR-MCNC: <12 MG/L
MICROALBUMIN/CREAT UR: NORMAL MG/G{CREAT}
NONHDLC SERPL-MCNC: 86 MG/DL
POTASSIUM SERPL-SCNC: 4.2 MMOL/L (ref 3.4–5.3)
PROT SERPL-MCNC: 7.2 G/DL (ref 6.4–8.3)
PSA SERPL DL<=0.01 NG/ML-MCNC: 3.26 NG/ML
SODIUM SERPL-SCNC: 136 MMOL/L (ref 135–145)
TRIGL SERPL-MCNC: 71 MG/DL

## 2024-02-29 PROCEDURE — 80061 LIPID PANEL: CPT

## 2024-02-29 PROCEDURE — G0103 PSA SCREENING: HCPCS

## 2024-02-29 PROCEDURE — 82570 ASSAY OF URINE CREATININE: CPT

## 2024-02-29 PROCEDURE — 80053 COMPREHEN METABOLIC PANEL: CPT

## 2024-02-29 PROCEDURE — 82043 UR ALBUMIN QUANTITATIVE: CPT

## 2024-02-29 PROCEDURE — 36415 COLL VENOUS BLD VENIPUNCTURE: CPT

## 2024-03-01 SDOH — HEALTH STABILITY: PHYSICAL HEALTH: ON AVERAGE, HOW MANY DAYS PER WEEK DO YOU ENGAGE IN MODERATE TO STRENUOUS EXERCISE (LIKE A BRISK WALK)?: 3 DAYS

## 2024-03-01 SDOH — HEALTH STABILITY: PHYSICAL HEALTH: ON AVERAGE, HOW MANY MINUTES DO YOU ENGAGE IN EXERCISE AT THIS LEVEL?: 30 MIN

## 2024-03-01 ASSESSMENT — SOCIAL DETERMINANTS OF HEALTH (SDOH): HOW OFTEN DO YOU GET TOGETHER WITH FRIENDS OR RELATIVES?: ONCE A WEEK

## 2024-03-08 ENCOUNTER — OFFICE VISIT (OUTPATIENT)
Dept: FAMILY MEDICINE | Facility: CLINIC | Age: 84
End: 2024-03-08
Payer: MEDICARE

## 2024-03-08 VITALS
DIASTOLIC BLOOD PRESSURE: 78 MMHG | BODY MASS INDEX: 37.24 KG/M2 | HEIGHT: 71 IN | TEMPERATURE: 97.5 F | HEART RATE: 54 BPM | SYSTOLIC BLOOD PRESSURE: 130 MMHG | RESPIRATION RATE: 18 BRPM | OXYGEN SATURATION: 98 % | WEIGHT: 266 LBS

## 2024-03-08 DIAGNOSIS — E66.01 MORBID OBESITY (H): ICD-10-CM

## 2024-03-08 DIAGNOSIS — E78.5 HYPERLIPIDEMIA LDL GOAL <100: ICD-10-CM

## 2024-03-08 DIAGNOSIS — I10 HYPERTENSION GOAL BP (BLOOD PRESSURE) < 140/90: ICD-10-CM

## 2024-03-08 DIAGNOSIS — E03.9 ACQUIRED HYPOTHYROIDISM: ICD-10-CM

## 2024-03-08 DIAGNOSIS — I48.0 PAROXYSMAL ATRIAL FIBRILLATION (H): ICD-10-CM

## 2024-03-08 DIAGNOSIS — I50.30 NYHA CLASS 3 HEART FAILURE WITH PRESERVED EJECTION FRACTION (H): ICD-10-CM

## 2024-03-08 DIAGNOSIS — Z00.00 ENCOUNTER FOR MEDICARE ANNUAL WELLNESS EXAM: Primary | ICD-10-CM

## 2024-03-08 PROCEDURE — G0439 PPPS, SUBSEQ VISIT: HCPCS | Performed by: FAMILY MEDICINE

## 2024-03-08 PROCEDURE — 99214 OFFICE O/P EST MOD 30 MIN: CPT | Mod: 25 | Performed by: FAMILY MEDICINE

## 2024-03-08 RX ORDER — LEVOTHYROXINE SODIUM 200 UG/1
200 TABLET ORAL DAILY
Qty: 90 TABLET | Refills: 3 | Status: SHIPPED | OUTPATIENT
Start: 2024-03-08

## 2024-03-08 RX ORDER — LOVASTATIN 40 MG
40 TABLET ORAL AT BEDTIME
Qty: 90 TABLET | Refills: 3 | Status: SHIPPED | OUTPATIENT
Start: 2024-03-08

## 2024-03-08 RX ORDER — LOSARTAN POTASSIUM 100 MG/1
100 TABLET ORAL DAILY
Qty: 90 TABLET | Refills: 3 | Status: SHIPPED | OUTPATIENT
Start: 2024-03-08

## 2024-03-08 ASSESSMENT — PAIN SCALES - GENERAL: PAINLEVEL: NO PAIN (0)

## 2024-03-08 NOTE — PROGRESS NOTES
"Preventive Care Visit  [facility]  Huy Ma MD, Family Medicine  Mar 8, 2024    Assessment & Plan     Encounter for Medicare annual wellness exam   Discussed diet, exercise, wellness and other preventive recommendations related to health maintenance.   Follow up as needed for acute issues.    Physical exam recommended in one year.     Fall risks precautions.  Reviewed lab results with patient.    Hyperlipidemia LDL goal <100  Reviewed lipid panel  Discussed diet and weight loss  - lovastatin (MEVACOR) 40 MG tablet; Take 1 tablet (40 mg) by mouth at bedtime    Hypertension goal BP (blood pressure) < 140/90    - losartan (COZAAR) 100 MG tablet; Take 1 tablet (100 mg) by mouth daily    Acquired hypothyroidism    - levothyroxine (SYNTHROID/LEVOTHROID) 200 MCG tablet; Take 1 tablet (200 mcg) by mouth daily  - TSH with free T4 reflex; Future    Paroxysmal atrial fibrillation (H)    - HEART FAILURE ACTION PLAN  - apixaban ANTICOAGULANT (ELIQUIS ANTICOAGULANT) 5 MG tablet; Take 1 tablet (5 mg) by mouth 2 times daily    NYHA class 3 heart failure with preserved ejection fraction (H)  Stable, continue with current medicine  Follow up with Cardiology.    Morbid obesity (H)  Discussed diet and weight loss  Increase physical activities.      BMI  Estimated body mass index is 37.1 kg/m  as calculated from the following:    Height as of this encounter: 1.803 m (5' 11\").    Weight as of this encounter: 120.7 kg (266 lb).   Weight management plan: Discussed healthy diet and exercise guidelines    Counseling  Appropriate preventive services were discussed with this patient, including applicable screening as appropriate for fall prevention, nutrition, physical activity, Tobacco-use cessation, weight loss and cognition.  Checklist reviewing preventive services available has been given to the patient.  Reviewed patient's diet, addressing concerns and/or questions.   He is at risk for lack of exercise " and has been provided with information to increase physical activity for the benefit of his well-being.   The patient was instructed to see the dentist every 6 months.   The patient was provided with written information regarding signs of hearing loss.       Work on weight loss  Regular exercise    Luna Rubalcava is a 83 year old, presenting for the following:  Wellness Visit    Comes for an annual exam.  History of hypertension, paroxysmal A-fib, history of congestive heart failure, stable.  He does follow-up with cardiology.  Reviewed recent lab result with him.         3/8/2024     8:32 AM   Additional Questions   Roomed by Vickey PABON CMA   Accompanied by N/A         3/8/2024     8:32 AM   Patient Reported Additional Medications   Patient reports taking the following new medications None         Health Care Directive  Patient has a Health Care Directive on file  Advance care planning document is on file and is current.        3/1/2024   General Health   How would you rate your overall physical health? Good   Feel stress (tense, anxious, or unable to sleep) Not at all         3/1/2024   Nutrition   Diet: Regular (no restrictions)         3/1/2024   Exercise   Days per week of moderate/strenous exercise 3 days   Average minutes spent exercising at this level 30 min         3/1/2024   Social Factors   Frequency of gathering with friends or relatives Once a week   Worry food won't last until get money to buy more No   Food not last or not have enough money for food? No   Do you have housing?  Yes   Are you worried about losing your housing? No   Lack of transportation? No   Unable to get utilities (heat,electricity)? No         3/1/2024   Fall Risk   Fallen 2 or more times in the past year? No    No   Trouble with walking or balance? No    No          3/1/2024   Activities of Daily Living- Home Safety   Needs help with the following daily activites None of the above   Safety concerns in the home None of the  above         3/1/2024   Dental   Dentist two times every year? (!) NO         3/1/2024   Hearing Screening   Hearing concerns? (!) IT'S HARDER TO UNDERSTAND WOMEN'S VOICES THAN MEN'S VOICES.    (!) IT'S HARD TO FOLLOW A CONVERSATION IN A NOISY RESTAURANT OR CROWDED ROOM.    (!) TROUBLE UNDERSTANDING SOFT OR WHISPERED SPEECH.         3/1/2024   Driving Risk Screening   Patient/family members have concerns about driving No         3/1/2024   General Alertness/Fatigue Screening   Have you been more tired than usual lately? No         3/1/2024   Urinary Incontinence Screening   Bothered by leaking urine in past 6 months No         3/1/2024   TB Screening   Were you born outside of [place]?  No         Today's PHQ-2 Score:       3/7/2024     9:35 AM   PHQ-2 ( 1999 [employer])   Q1: Little interest or pleasure in doing things 0   Q2: Feeling down, depressed or hopeless 0   PHQ-2 Score 0   Q1: Little interest or pleasure in doing things Not at all   Q2: Feeling down, depressed or hopeless Not at all   PHQ-2 Score 0           3/1/2024   Substance Use   Alcohol more than 3/day or more than 7/wk No   Do you have a current opioid prescription? No   How severe/bad is pain from 1 to 10? 0/10 (No Pain)   Do you use any other substances recreationally? No     Social History     Tobacco Use    Smoking status: Never     Passive exposure: Past    Smokeless tobacco: Former     Types: Chew     Quit date: 12/12/2000    Tobacco comments:     Chewed tobacco for 30 years   Vaping Use    Vaping Use: Never used   Substance Use Topics    Alcohol use: Yes     Comment: 1 or less drink per day    Drug use: No               Reviewed and updated as needed this visit by Provider                    Past Medical History:   Diagnosis Date    Acquired hypothyroidism 12/31/2018    Arthritis 2010    Essential hypertension, benign 2004    History of basal cell carcinoma     Macular degeneration 1980s    Paroxysmal atrial fibrillation (H) 12/31/2018    Pure  hypercholesterolemia 2004    goal ldl <100     Past Surgical History:   Procedure Laterality Date    ABDOMEN SURGERY  1970's, 1980's    Two hernia surgerys    BIOPSY  07/2016    polyp removed from vocal cord - benign    CATARACT EXTRACTION, BILATERAL Bilateral 08/2023    At [facility]    COLONOSCOPY      COSMETIC SURGERY      EYE SURGERY  2000    Macular scar, presently being treated with avastin    HERNIA REPAIR, INGUINAL RT/LT  1970,1980    ORTHOPEDIC SURGERY  April 2015, June 2015    Right hip replacement, Left hip replacement    SURGICAL HISTORY OF -   2000    cholesteatoma removal L    SURGICAL HISTORY OF -   03/2004    laser macular    SURGICAL HISTORY OF -   04/2015    right hip replacement    TONSILLECTOMY & ADENOIDECTOMY  1961     OB History   No obstetric history on file.     Current providers sharing in care for this patient include:  Patient Care Team:  Huy Ma MD as PCP - General (Family Medicine)  Mona Real, RN as Specialty Care Coordinator (Cardiology)  Huy Ma MD as Assigned PCP  Piyush Lopez RN as Specialty Care Coordinator (Cardiology)  Dana Schaffer APRN CNP as Nurse Practitioner (Dermatology)  Rich Bae MD as Assigned Surgical Provider  Benji Cornelius MD as Assigned Heart and Vascular Provider    The following health maintenance items are reviewed in Epic and correct as of today:  Health Maintenance   Topic Date Due    HF ACTION PLAN  Never done    ANNUAL REVIEW OF HM ORDERS  03/01/2024    CBC  07/26/2024    HEMOGLOBIN  07/26/2024    ALT  02/28/2025    BMP  02/28/2025    LIPID  02/28/2025    MICROALBUMIN  02/28/2025    PSA  02/28/2025    MEDICARE ANNUAL WELLNESS VISIT  03/08/2025    FALL RISK ASSESSMENT  03/08/2025    ADVANCE CARE PLANNING  03/01/2028    DTAP/TDAP/TD IMMUNIZATION (3 - Td or Tdap) 03/20/2033    TSH W/FREE T4 REFLEX  Completed    PHQ-2 (once per calendar year)  Completed    INFLUENZA VACCINE  Completed    Pneumococcal Vaccine: 65+  "Years  Completed    URINALYSIS  Completed    ZOSTER IMMUNIZATION  Completed    RSV VACCINE (Pregnancy & 60+)  Completed    COVID-19 Vaccine  Completed    IPV IMMUNIZATION  Aged Out    HPV IMMUNIZATION  Aged Out    MENINGITIS IMMUNIZATION  Aged Out    RSV MONOCLONAL ANTIBODY  Aged Out    COLORECTAL CANCER SCREENING  Discontinued         Review of Systems  Constitutional, HEENT, cardiovascular, pulmonary, GI, , musculoskeletal, neuro, skin, endocrine and psych systems are negative, except as otherwise noted.     Objective    Exam  BP (!) 140/82 (BP Location: Right arm, Patient Position: Chair, Cuff Size: Adult Large)   Pulse 54   Temp 97.5  F (36.4  C) (Oral)   Resp 18   Ht 1.803 m (5' 11\")   Wt 120.7 kg (266 lb)   SpO2 98%   BMI 37.10 kg/m     Estimated body mass index is 37.1 kg/m  as calculated from the following:    Height as of this encounter: 1.803 m (5' 11\").    Weight as of this encounter: 120.7 kg (266 lb).    Physical Exam  GENERAL: alert and no distress  EYES: Eyes grossly normal to inspection, PERRL and conjunctivae and sclerae normal  HENT: ear canals and TM's normal, nose and mouth without ulcers or lesions  NECK: no adenopathy, no asymmetry, masses, or scars  RESP: lungs clear to auscultation - no rales, rhonchi or wheezes  CV: regular rate and rhythm, normal S1 S2, no S3 or S4, no murmur, click or rub, no peripheral edema  ABDOMEN: soft, nontender, no hepatosplenomegaly, no masses and bowel sounds normal  MS: no gross musculoskeletal defects noted, no edema  SKIN: no suspicious lesions or rashes  NEURO: Normal strength and tone, mentation intact and speech normal  PSYCH: mentation appears normal, affect normal/bright         3/8/2024   Mini Cog   Clock Draw Score 2 Normal   3 Item Recall 3 objects recalled   Mini Cog Total Score 5            Signed Electronically by: Huy Ma MD    "

## 2024-03-08 NOTE — PATIENT INSTRUCTIONS
Preventive Care Advice   This is general advice given by our system to help you stay healthy. However, your care team may have specific advice just for you. Please talk to your care team about your preventive care needs.  Nutrition  Eat 5 or more servings of fruits and vegetables each day.  Try wheat bread, brown rice and whole grain pasta (instead of white bread, rice, and pasta).  Get enough calcium and vitamin D. Check the label on foods and aim for 100% of the RDA (recommended daily allowance).  Lifestyle  Exercise at least 150 minutes each week   (30 minutes a day, 5 days a week).  Do muscle strengthening activities 2 days a week. These help control your weight and prevent disease.  No smoking.  Wear sunscreen to prevent skin cancer.  Have a dental exam and cleaning every 6 months.  Yearly exams  See your health care team every year to talk about:  Any changes in your health.  Any medicines your care team has prescribed.  Preventive care, family planning, and ways to prevent chronic diseases.  Shots (vaccines)   HPV shots (up to age 26), if you've never had them before.  Hepatitis B shots (up to age 59), if you've never had them before.  COVID-19 shot: Get this shot when it's due.  Flu shot: Get a flu shot every year.  Tetanus shot: Get a tetanus shot every 10 years.  Pneumococcal, hepatitis A, and RSV shots: Ask your care team if you need these based on your risk.  Shingles shot (for age 50 and up).  General health tests  Diabetes screening:  Starting at age 35, Get screened for diabetes at least every 3 years.  If you are younger than age 35, ask your care team if you should be screened for diabetes.  Cholesterol test: At age 39, start having a cholesterol test every 5 years, or more often if advised.  Bone density scan (DEXA): At age 50, ask your care team if you should have this scan for osteoporosis (brittle bones).  Hepatitis C: Get tested at least once in your life.  STIs (sexually transmitted  infections)  Before age 24: Ask your care team if you should be screened for STIs.  After age 24: Get screened for STIs if you're at risk. You are at risk for STIs (including HIV) if:  You are sexually active with more than one person.  You don't use condoms every time.  You or a partner was diagnosed with a sexually transmitted infection.  If you are at risk for HIV, ask about PrEP medicine to prevent HIV.  Get tested for HIV at least once in your life, whether you are at risk for HIV or not.  Cancer screening tests  Cervical cancer screening: If you have a cervix, begin getting regular cervical cancer screening tests at age 21. Most people who have regular screenings with normal results can stop after age 65. Talk about this with your provider.  Breast cancer scan (mammogram): If you've ever had breasts, begin having regular mammograms starting at age 40. This is a scan to check for breast cancer.  Colon cancer screening: It is important to start screening for colon cancer at age 45.  Have a colonoscopy test every 10 years (or more often if you're at risk) Or, ask your provider about stool tests like a FIT test every year or Cologuard test every 3 years.  To learn more about your testing options, visit: https://www.[employer]/183679.pdf.  For help making a decision, visit: https://bit.ly/dh45049.  Prostate cancer screening test: If you have a prostate and are age 55 to 69, ask your provider if you would benefit from a yearly prostate cancer screening test.  Lung cancer screening: If you are a current or former smoker age 50 to 80, ask your care team if ongoing lung cancer screenings are right for you.  For informational purposes only. Not to replace the advice of your health care provider. Copyright   2023 [employer]. All rights reserved. Clinically reviewed by the [facility] Transitions Program. PANOSOL 765788 - REV 01/24.    Hearing Loss: Care Instructions  Overview     Hearing loss is a  sudden or slow decrease in how well you hear. It can range from slight to profound. Permanent hearing loss can occur with aging. It also can happen when you are exposed long-term to loud noise. Examples include listening to loud music, riding motorcycles, or being around other loud machines.  Hearing loss can affect your work and home life. It can make you feel lonely or depressed. You may feel that you have lost your independence. But hearing aids and other devices can help you hear better and feel connected to others.  Follow-up care is a key part of your treatment and safety. Be sure to make and go to all appointments, and call your doctor if you are having problems. It's also a good idea to know your test results and keep a list of the medicines you take.  How can you care for yourself at home?  Avoid loud noises whenever possible. This helps keep your hearing from getting worse.  Always wear hearing protection around loud noises.  Wear a hearing aid as directed.  A professional can help you pick a hearing aid that will work best for you.  You can also get hearing aids over the counter for mild to moderate hearing loss.  Have hearing tests as your doctor suggests. They can show whether your hearing has changed. Your hearing aid may need to be adjusted.  Use other devices as needed. These may include:  Telephone amplifiers and hearing aids that can connect to a television, stereo, radio, or microphone.  Devices that use lights or vibrations. These alert you to the doorbell, a ringing telephone, or a baby monitor.  Television closed-captioning. This shows the words at the bottom of the screen. Most new TVs can do this.  TTY (text telephone). This lets you type messages back and forth on the telephone instead of talking or listening. These devices are also called TDD. When messages are typed on the keyboard, they are sent over the phone line to a receiving TTY. The message is shown on a monitor.  Use text  "messaging, social media, and email if it is hard for you to communicate by telephone.  Try to learn a listening technique called speechreading. It is not lipreading. You pay attention to people's gestures, expressions, posture, and tone of voice. These clues can help you understand what a person is saying. Face the person you are talking to, and have them face you. Make sure the lighting is good. You need to see the other person's face clearly.  Think about counseling if you need help to adjust to your hearing loss.  When should you call for help?  Watch closely for changes in your health, and be sure to contact your doctor if:    You think your hearing is getting worse.     You have new symptoms, such as dizziness or nausea.   Where can you learn more?  Go to https://www.[employer].net/patiented  Enter R798 in the search box to learn more about \"Hearing Loss: Care Instructions.\"  Current as of: February 28, 2023               Content Version: 13.8    2030-6686 [employer].   Care instructions adapted under license by your healthcare professional. If you have questions about a medical condition or this instruction, always ask your healthcare professional. [employer] disclaims any warranty or liability for your use of this information.      "

## 2024-03-24 DIAGNOSIS — E78.2 MIXED HYPERLIPIDEMIA: ICD-10-CM

## 2024-03-24 DIAGNOSIS — I10 BENIGN ESSENTIAL HYPERTENSION: ICD-10-CM

## 2024-03-28 RX ORDER — METOPROLOL SUCCINATE 100 MG/1
150 TABLET, EXTENDED RELEASE ORAL DAILY
Qty: 135 TABLET | Refills: 0 | OUTPATIENT
Start: 2024-03-28

## 2024-04-01 ENCOUNTER — MYC REFILL (OUTPATIENT)
Dept: CARDIOLOGY | Facility: CLINIC | Age: 84
End: 2024-04-01
Payer: MEDICARE

## 2024-04-01 ENCOUNTER — TELEPHONE (OUTPATIENT)
Dept: CARDIOLOGY | Facility: CLINIC | Age: 84
End: 2024-04-01
Payer: MEDICARE

## 2024-04-01 DIAGNOSIS — I10 BENIGN ESSENTIAL HYPERTENSION: ICD-10-CM

## 2024-04-01 DIAGNOSIS — E78.2 MIXED HYPERLIPIDEMIA: ICD-10-CM

## 2024-04-01 NOTE — TELEPHONE ENCOUNTER
Health Call Center    Phone Message    May a detailed message be left on voicemail: yes     Reason for Call: Medication Refill Request    Has the patient contacted the pharmacy for the refill? Yes   Name of medication being requested: metoprolol succinate ER (TOPROL XL) 100 MG 24 hr tablet   Provider who prescribed the medication: Dr. Cornelius  Pharmacy:    [id number] [address] [place] [address]    Date medication is needed: ASAP   Patient stated pharmacy has tried multiple times to obtain refill orders, but no response. Patient is out of medication and not scheduled to see Dr. Cornelius until June. Please review and send orders as needed. Thank you!    Action Taken: Other: Cardiology    Travel Screening: Not Applicable    Thank you!  [facility]

## 2024-04-02 RX ORDER — METOPROLOL SUCCINATE 100 MG/1
150 TABLET, EXTENDED RELEASE ORAL DAILY
Qty: 135 TABLET | Refills: 0 | Status: CANCELLED | OUTPATIENT
Start: 2024-04-02

## 2024-04-02 RX ORDER — METOPROLOL SUCCINATE 100 MG/1
150 TABLET, EXTENDED RELEASE ORAL DAILY
Qty: 135 TABLET | Refills: 0 | Status: SHIPPED | OUTPATIENT
Start: 2024-04-02 | End: 2024-06-28

## 2024-04-02 NOTE — TELEPHONE ENCOUNTER
metoprolol succinate ER (TOPROL XL) 100 MG 24 hr tablet   Disp-135 tablet, R-0   Start: 04/02/2024     Receipt confirmed by pharmacy (4/2/2024

## 2024-04-02 NOTE — TELEPHONE ENCOUNTER
Last Clinic Visit: 3/3/2023 [facility]    Future Clinic Visit: 6/25/2024    metoprolol succinate ER (TOPROL XL) 100 MG 24 hr tablet: refill sent 90 day supply  - <18 months last visit  - patient scheduled to be seen within the next 90

## 2024-04-03 ENCOUNTER — TELEPHONE (OUTPATIENT)
Dept: CARDIOLOGY | Facility: CLINIC | Age: 84
End: 2024-04-03
Payer: MEDICARE

## 2024-04-03 NOTE — TELEPHONE ENCOUNTER
----- Message from Imelda Servin RN sent at 4/3/2024 11:03 AM CDT -----  Regarding: RE: Dr. Adryan Harrison  Yes please offer him any of the 4/4 spots on hold.  Is that what you were asking?   ----- Message -----  From: Lashawn Peralta  Sent: 4/3/2024  10:14 AM CDT  To: Rylee Sanchez; Imelda Servin RN  Subject: RE: Dr. Adryan Harrison                           Just filled the open spot on 4/4. Ok to offer reschedule spots? Or is there anything else I should offer this pt?    Thank you,    Lashawn Pratt    ----- Message -----  From: Rylee Sanchez  Sent: 4/2/2024   6:22 PM CDT  To: Imelda Servin RN; #  Subject: Dr. Adryan Harrison                               Hello,    Please call this patient and offer 04/04 appointment + labs with Dr. Cornelius. Remove from wait list.  Thank you!

## 2024-04-03 NOTE — TELEPHONE ENCOUNTER
Patient confirmed scheduled appointment:  Date: 4/4/2024  Time: 1:30PM  Visit type: Return Heart Failure  Provider: Dr. Cornelius  Location: [place] [address]  Testing/imaging: lab prior  Additional notes: 4/3 Pt scheduled RHF w/ Dr. Cornelius 4/4 w/ lab prior. Cancelled 6/25 appt w/ lab prior and removed from waitlist. FRAKNO Pratt 4/3/2024

## 2024-04-03 NOTE — TELEPHONE ENCOUNTER
Called waitlist patient and LVM to call me (Lashawn, Cardiology Clinic ) directly at [phone] to schedule Return Heart Failure appointment with Dr. Cornelius on this date 4/4/2024 & time (any held spot ok per Trevor GIFFORD) at this location [facility], [address] [place] [address], 3rd floor, [place] [address]. Pt will need lab prior to appt.     Please note there is no guarantee this appointment will be available as it is first come first serve.     Lashawn Pratt 4/3/2024

## 2024-04-04 ENCOUNTER — LAB (OUTPATIENT)
Dept: LAB | Facility: CLINIC | Age: 84
End: 2024-04-04
Attending: INTERNAL MEDICINE
Payer: MEDICARE

## 2024-04-04 ENCOUNTER — OFFICE VISIT (OUTPATIENT)
Dept: CARDIOLOGY | Facility: CLINIC | Age: 84
End: 2024-04-04
Attending: INTERNAL MEDICINE
Payer: MEDICARE

## 2024-04-04 VITALS
SYSTOLIC BLOOD PRESSURE: 136 MMHG | DIASTOLIC BLOOD PRESSURE: 74 MMHG | HEART RATE: 71 BPM | OXYGEN SATURATION: 96 % | BODY MASS INDEX: 37.85 KG/M2 | WEIGHT: 271.4 LBS

## 2024-04-04 DIAGNOSIS — E78.2 MIXED HYPERLIPIDEMIA: ICD-10-CM

## 2024-04-04 DIAGNOSIS — I10 BENIGN ESSENTIAL HYPERTENSION: ICD-10-CM

## 2024-04-04 DIAGNOSIS — E03.9 ACQUIRED HYPOTHYROIDISM: ICD-10-CM

## 2024-04-04 DIAGNOSIS — E78.5 HYPERLIPIDEMIA LDL GOAL <100: ICD-10-CM

## 2024-04-04 DIAGNOSIS — I50.30 NYHA CLASS 3 HEART FAILURE WITH PRESERVED EJECTION FRACTION (H): ICD-10-CM

## 2024-04-04 LAB — TSH SERPL DL<=0.005 MIU/L-ACNC: 2.02 UIU/ML (ref 0.3–4.2)

## 2024-04-04 PROCEDURE — 84443 ASSAY THYROID STIM HORMONE: CPT | Performed by: PATHOLOGY

## 2024-04-04 PROCEDURE — G0463 HOSPITAL OUTPT CLINIC VISIT: HCPCS | Performed by: INTERNAL MEDICINE

## 2024-04-04 PROCEDURE — 99215 OFFICE O/P EST HI 40 MIN: CPT | Performed by: INTERNAL MEDICINE

## 2024-04-04 PROCEDURE — 36415 COLL VENOUS BLD VENIPUNCTURE: CPT | Performed by: PATHOLOGY

## 2024-04-04 ASSESSMENT — PAIN SCALES - GENERAL: PAINLEVEL: NO PAIN (0)

## 2024-04-04 NOTE — PROGRESS NOTES
"Optimal V[facility] Metrics    Blood Pressure   {BP < 140/90:2633371::\"BP < 140/90 Yes\"}    On Aspirin  {On Aspirin Yes/No:5464532::\"Yes\"}    On Statin  {On Statin Yes/No:7290867::\"Yes\"}    Tobacco use  {Tobacco use Yes/No:9903812::\"No\"}    "

## 2024-04-04 NOTE — LETTER
4/4/2024      RE: Sami Harmon  [address] [place] [id number]       Dear Colleague,    Thank you for the opportunity to participate in the care of your patient, Sami Harmon, at the [facility]. Please see a copy of my visit note below.    `April 4, 2024     Faustino Resendiz is an 84 yo gentleman with minimal past medical history including HTN, HL seen by primary care provider and then by myself for atrial fibrillation. He was checking his blood pressure at home and he did notice that his blood pressure cuff shows irregular heart rate.  This prompted him asking his primary care provider about the regular heart rate and disease how his atrial fibrillation was discovered again he is completely asymptomatic. He then came to cardiology clinic, was in sinus rhythm. Was started on low dose metoprolol and Ziopatch has been placed as results as below confirming paroxysmal atrial fibrillation, rate controlled with a burden of 62%. Today he returns to follow up.  He is feeling okay no new episodes of palpitations no dizziness lightheadedness.  No falls.  He continues to drive.  Takes her medications including apixaban and no bleeding.  No other issues     PAST MEDICAL HISTORY:  Past Medical History:   Diagnosis Date     Acquired hypothyroidism 12/31/2018     Arthritis 2010     Essential hypertension, benign 2004     History of basal cell carcinoma      Macular degeneration 1980s     Paroxysmal atrial fibrillation (H) 12/31/2018     Pure hypercholesterolemia 2004    goal ldl <100     FAMILY HISTORY:  Family History   Problem Relation Age of Onset     Arthritis Mother      Eye Disorder Mother      Diabetes Mother         type 2     Hypertension Mother      Cancer - colorectal Mother      Cerebrovascular Disease Mother      Osteoporosis Mother      Obesity Mother      Colon Cancer Mother      Heart Disease Father      Prostate  Cancer Father      Cerebrovascular Disease Father      Heart Disease Brother      Prostate Cancer Brother      Coronary Artery Disease Brother      Gastrointestinal Disease Son      Prostate Cancer Brother      SOCIAL HISTORY:  Social History     Socioeconomic History     Marital status:      Spouse name: Meredith     Number of children: 4   Occupational History     Occupation:      Employer: RETIRED   Tobacco Use     Smoking status: Never     Passive exposure: Past     Smokeless tobacco: Former     Types: Chew     Quit date: 12/12/2000     Tobacco comments:     Chewed tobacco for 30 years   Vaping Use     Vaping Use: Never used   Substance and Sexual Activity     Alcohol use: Yes     Comment: 1 or less drink per day     Drug use: No     Sexual activity: Not Currently     Partners: Female   Other Topics Concern     Parent/sibling w/ CABG, MI or angioplasty before 65F 55M? No      Service No     Blood Transfusions No     Caffeine Concern No     Occupational Exposure No     Hobby Hazards No     Sleep Concern No     Stress Concern No     Weight Concern No     Special Diet No     Back Care No     Exercise No     Bike Helmet No     Seat Belt Yes     Self-Exams Yes     Social Determinants of Health     Financial Resource Strain: Low Risk  (3/1/2024)    Financial Resource Strain      Within the past 12 months, have you or your family members you live with been unable to get utilities (heat, electricity) when it was really needed?: No   Food Insecurity: Low Risk  (3/1/2024)    Food Insecurity      Within the past 12 months, did you worry that your food would run out before you got money to buy more?: No      Within the past 12 months, did the food you bought just not last and you didn t have money to get more?: No   Transportation Needs: Low Risk  (3/1/2024)    Transportation Needs      Within the past 12 months, has lack of transportation kept you from medical appointments, getting your medicines,  non-medical meetings or appointments, work, or from getting things that you need?: No   Physical Activity: Insufficiently Active (3/1/2024)    Exercise Vital Sign      Days of Exercise per Week: 3 days      Minutes of Exercise per Session: 30 min   Stress: No Stress Concern Present (3/1/2024)    [employer] [place] [employer] - Occupational Stress Questionnaire      Feeling of Stress : Not at all   Social Connections: Unknown (3/1/2024)    Social Connection and Isolation Panel [NHANES]      Frequency of Social Gatherings with Friends and Family: Once a week   Interpersonal Safety: Low Risk  (3/8/2024)    Interpersonal Safety      Do you feel physically and emotionally safe where you currently live?: Yes      Within the past 12 months, have you been hit, slapped, kicked or otherwise physically hurt by someone?: No      Within the past 12 months, have you been humiliated or emotionally abused in other ways by your partner or ex-partner?: No   Housing Stability: Low Risk  (3/1/2024)    Housing Stability      Do you have housing? : Yes      Are you worried about losing your housing?: No     CURRENT MEDICATIONS:  Current Outpatient Medications   Medication Sig Dispense Refill     apixaban ANTICOAGULANT (ELIQUIS ANTICOAGULANT) 5 MG tablet Take 1 tablet (5 mg) by mouth 2 times daily 180 tablet 3     Ferrous Sulfate (IRON SUPPLEMENT PO) Take by mouth daily        latanoprost (XALATAN) 0.005 % ophthalmic solution Place 1 drop into both eyes daily 7.5 mL 3     levothyroxine (SYNTHROID/LEVOTHROID) 200 MCG tablet Take 1 tablet (200 mcg) by mouth daily 90 tablet 3     losartan (COZAAR) 100 MG tablet Take 1 tablet (100 mg) by mouth daily 90 tablet 3     lovastatin (MEVACOR) 40 MG tablet Take 1 tablet (40 mg) by mouth at bedtime 90 tablet 3     metoprolol succinate ER (TOPROL XL) 100 MG 24 hr tablet Take 1.5 tablets (150 mg) by mouth daily 135 tablet 0     MULTI-VITAMIN OR 1 tablets  daily       Psyllium (METAMUCIL PO)  Take by mouth daily 1 teaspoon added to 1 glass of water once daily       No current facility-administered medications for this visit.       EXAM:  /74 (BP Location: Right arm, Patient Position: Chair, Cuff Size: Adult Large)   Pulse 71   Wt 123.1 kg (271 lb 6.4 oz)   SpO2 96%   BMI 37.85 kg/m    General: appears comfortable, alert and oriented  Head: normocephalic, atraumatic  Eyes: anicteric sclera, EOMI , PERRL  Neck: no adenopathy  Orophyarynx: moist mucosa, no lesions noted  Heart: regular, S1/S2, no murmurs, rubs or gallop. Estimated JVP at 5 cmH2O  Lungs: CTAB, No wheezing.   Abdomen: soft, non-tender, bowel sounds present, no hepatosplenomegaly  Extremities: No LE edema today  Skin: no open lesions noted  Neuro: grossly non-focal  Psych: no evidence of depression noted     Labs:  Lab Results   Component Value Date    WBC 7.0 07/26/2023    HGB 13.2 (L) 07/26/2023    HCT 39.0 (L) 07/26/2023     (L) 07/26/2023     02/29/2024    POTASSIUM 4.2 02/29/2024    CHLORIDE 101 02/29/2024    CO2 26 02/29/2024    BUN 16.5 02/29/2024    CR 1.34 (H) 02/29/2024    GLC 99 02/29/2024    NTBNP 2,167 (H) 02/24/2023    AST 42 02/29/2024    ALT 42 02/29/2024    ALKPHOS 80 02/29/2024    BILITOTAL 1.0 02/29/2024    INR 1.0 06/04/2015      Echocardiogram reviewed 12/2018:  Global and regional left ventricular function is normal with an EF of 55-60%.  The right ventricle is normal size. Global right ventricular function is  Normal. Mild aortic valve sclerosis is present. The proximal Asc Aorta measures 4.2cm in diameter. The aortic root index is 1.8cm/m2 (Normal index for males is 1.4-1.7cm/m2) No pericardial effusion is present.     Ziopatch 3/2019:   62% a-fib burden, mostly rate controlled.      TTE 2/25/22  Technically difficult study.Extremely poor acoustic windows. Global and regional left ventricular function is normal with an EF of 55-60%. Diastolic function not assessed due to atrial  fibrillation.  Right ventricular function, chamber size, wall motion, and thickness are normal.  Pulmonary artery systolic pressure is normal.  Sinuses of Valsalva 4.3 cm.  Ascending aorta 4.3 cm.  The inferior vena cava cannot be assessed. No pericardial effusion is present.  No significant changes noted.     ASSESSMENT AND PLAN:  In summary, patient is a 83 year old history of hypertension hyperlipidemia and paroxysmal atrial fibrillation on apixaban who presented to the cardiology clinic to follow up.    Patient remains in atrial fibrillation however he is heart rate is well controlled.  His blood pressure is also pretty well controlled at this time.  He has no complications from apixaban use tolerated well.    At this time we will not make any medication changes given how well he is doing.  He will think about the potential A-fib ablation procedure which we discussed at length.  Given how asymptomatic he is I am not sure there is a need risk-benefit ratio and is favored to proceed with ablation procedure however this is something that we can reconsider.  I will continue current therapy and reevaluate     Follow up:   1 year with labs     I appreciate the opportunity to participate in the care of Sami Harmon . Please do not hesitate to contact me with any further questions.  I have spent a total of 40 minutes today reviewing labs, imaging studies, discussing with colleagues, face-to-face time with patient and documentation in the medical record.  The longitudinal plan of care for the diagnosis(es)/condition(s) as documented were addressed during this visit. Due to the added complexity in care, I will continue to support Bill in the subsequent management and with ongoing continuity of care.    Sincerely,   Benji Cornelius MD  [facility] Division of Cardiology         Please do not hesitate to contact me if you have any questions/concerns.     Sincerely,     Benji Cornelius MD

## 2024-04-04 NOTE — PROGRESS NOTES
`April 4, 2024     Faustino Resendiz is an 82 yo gentleman with minimal past medical history including HTN, HL seen by primary care provider and then by myself for atrial fibrillation. He was checking his blood pressure at home and he did notice that his blood pressure cuff shows irregular heart rate.  This prompted him asking his primary care provider about the regular heart rate and disease how his atrial fibrillation was discovered again he is completely asymptomatic. He then came to cardiology clinic, was in sinus rhythm. Was started on low dose metoprolol and Ziopatch has been placed as results as below confirming paroxysmal atrial fibrillation, rate controlled with a burden of 62%. Today he returns to follow up.  He is feeling okay no new episodes of palpitations no dizziness lightheadedness.  No falls.  He continues to drive.  Takes her medications including apixaban and no bleeding.  No other issues     PAST MEDICAL HISTORY:  Past Medical History:   Diagnosis Date    Acquired hypothyroidism 12/31/2018    Arthritis 2010    Essential hypertension, benign 2004    History of basal cell carcinoma     Macular degeneration 1980s    Paroxysmal atrial fibrillation (H) 12/31/2018    Pure hypercholesterolemia 2004    goal ldl <100     FAMILY HISTORY:  Family History   Problem Relation Age of Onset    Arthritis Mother     Eye Disorder Mother     Diabetes Mother         type 2    Hypertension Mother     Cancer - colorectal Mother     Cerebrovascular Disease Mother     Osteoporosis Mother     Obesity Mother     Colon Cancer Mother     Heart Disease Father     Prostate Cancer Father     Cerebrovascular Disease Father     Heart Disease Brother     Prostate Cancer Brother     Coronary Artery Disease Brother     Gastrointestinal Disease Son     Prostate Cancer Brother      SOCIAL HISTORY:  Social History     Socioeconomic History    Marital status:      Spouse name: Meredith    Number of children: 4   Occupational History     Occupation:      Employer: RETIRED   Tobacco Use    Smoking status: Never     Passive exposure: Past    Smokeless tobacco: Former     Types: Chew     Quit date: 12/12/2000    Tobacco comments:     Chewed tobacco for 30 years   Vaping Use    Vaping Use: Never used   Substance and Sexual Activity    Alcohol use: Yes     Comment: 1 or less drink per day    Drug use: No    Sexual activity: Not Currently     Partners: Female   Other Topics Concern    Parent/sibling w/ CABG, MI or angioplasty before 65F 55M? No     Service No    Blood Transfusions No    Caffeine Concern No    Occupational Exposure No    Hobby Hazards No    Sleep Concern No    Stress Concern No    Weight Concern No    Special Diet No    Back Care No    Exercise No    Bike Helmet No    Seat Belt Yes    Self-Exams Yes     Social Determinants of Health     Financial Resource Strain: Low Risk  (3/1/2024)    Financial Resource Strain     Within the past 12 months, have you or your family members you live with been unable to get utilities (heat, electricity) when it was really needed?: No   Food Insecurity: Low Risk  (3/1/2024)    Food Insecurity     Within the past 12 months, did you worry that your food would run out before you got money to buy more?: No     Within the past 12 months, did the food you bought just not last and you didn t have money to get more?: No   Transportation Needs: Low Risk  (3/1/2024)    Transportation Needs     Within the past 12 months, has lack of transportation kept you from medical appointments, getting your medicines, non-medical meetings or appointments, work, or from getting things that you need?: No   Physical Activity: Insufficiently Active (3/1/2024)    Exercise Vital Sign     Days of Exercise per Week: 3 days     Minutes of Exercise per Session: 30 min   Stress: No Stress Concern Present (3/1/2024)    [employer]izen [employer] [place] [employer] - Occupational Stress Questionnaire     Feeling of Stress : Not  at all   Social Connections: Unknown (3/1/2024)    Social Connection and Isolation Panel [NHANES]     Frequency of Social Gatherings with Friends and Family: Once a week   Interpersonal Safety: Low Risk  (3/8/2024)    Interpersonal Safety     Do you feel physically and emotionally safe where you currently live?: Yes     Within the past 12 months, have you been hit, slapped, kicked or otherwise physically hurt by someone?: No     Within the past 12 months, have you been humiliated or emotionally abused in other ways by your partner or ex-partner?: No   Housing Stability: Low Risk  (3/1/2024)    Housing Stability     Do you have housing? : Yes     Are you worried about losing your housing?: No     CURRENT MEDICATIONS:  Current Outpatient Medications   Medication Sig Dispense Refill    apixaban ANTICOAGULANT (ELIQUIS ANTICOAGULANT) 5 MG tablet Take 1 tablet (5 mg) by mouth 2 times daily 180 tablet 3    Ferrous Sulfate (IRON SUPPLEMENT PO) Take by mouth daily       latanoprost (XALATAN) 0.005 % ophthalmic solution Place 1 drop into both eyes daily 7.5 mL 3    levothyroxine (SYNTHROID/LEVOTHROID) 200 MCG tablet Take 1 tablet (200 mcg) by mouth daily 90 tablet 3    losartan (COZAAR) 100 MG tablet Take 1 tablet (100 mg) by mouth daily 90 tablet 3    lovastatin (MEVACOR) 40 MG tablet Take 1 tablet (40 mg) by mouth at bedtime 90 tablet 3    metoprolol succinate ER (TOPROL XL) 100 MG 24 hr tablet Take 1.5 tablets (150 mg) by mouth daily 135 tablet 0    MULTI-VITAMIN OR 1 tablets  daily      Psyllium (METAMUCIL PO) Take by mouth daily 1 teaspoon added to 1 glass of water once daily       No current facility-administered medications for this visit.       EXAM:  /74 (BP Location: Right arm, Patient Position: Chair, Cuff Size: Adult Large)   Pulse 71   Wt 123.1 kg (271 lb 6.4 oz)   SpO2 96%   BMI 37.85 kg/m    General: appears comfortable, alert and oriented  Head: normocephalic, atraumatic  Eyes: anicteric sclera, EOMI  , PERRL  Neck: no adenopathy  Orophyarynx: moist mucosa, no lesions noted  Heart: regular, S1/S2, no murmurs, rubs or gallop. Estimated JVP at 5 cmH2O  Lungs: CTAB, No wheezing.   Abdomen: soft, non-tender, bowel sounds present, no hepatosplenomegaly  Extremities: No LE edema today  Skin: no open lesions noted  Neuro: grossly non-focal  Psych: no evidence of depression noted     Labs:  Lab Results   Component Value Date    WBC 7.0 07/26/2023    HGB 13.2 (L) 07/26/2023    HCT 39.0 (L) 07/26/2023     (L) 07/26/2023     02/29/2024    POTASSIUM 4.2 02/29/2024    CHLORIDE 101 02/29/2024    CO2 26 02/29/2024    BUN 16.5 02/29/2024    CR 1.34 (H) 02/29/2024    GLC 99 02/29/2024    NTBNP 2,167 (H) 02/24/2023    AST 42 02/29/2024    ALT 42 02/29/2024    ALKPHOS 80 02/29/2024    BILITOTAL 1.0 02/29/2024    INR 1.0 06/04/2015      Echocardiogram reviewed 12/2018:  Global and regional left ventricular function is normal with an EF of 55-60%.  The right ventricle is normal size. Global right ventricular function is  Normal. Mild aortic valve sclerosis is present. The proximal Asc Aorta measures 4.2cm in diameter. The aortic root index is 1.8cm/m2 (Normal index for males is 1.4-1.7cm/m2) No pericardial effusion is present.     Ziopatch 3/2019:   62% a-fib burden, mostly rate controlled.      TTE 2/25/22  Technically difficult study.Extremely poor acoustic windows. Global and regional left ventricular function is normal with an EF of 55-60%. Diastolic function not assessed due to atrial fibrillation.  Right ventricular function, chamber size, wall motion, and thickness are normal.  Pulmonary artery systolic pressure is normal.  Sinuses of Valsalva 4.3 cm.  Ascending aorta 4.3 cm.  The inferior vena cava cannot be assessed. No pericardial effusion is present.  No significant changes noted.     ASSESSMENT AND PLAN:  In summary, patient is a 83 year old history of hypertension hyperlipidemia and paroxysmal atrial  fibrillation on apixaban who presented to the cardiology clinic to follow up.    Patient remains in atrial fibrillation however he is heart rate is well controlled.  His blood pressure is also pretty well controlled at this time.  He has no complications from apixaban use tolerated well.    At this time we will not make any medication changes given how well he is doing.  He will think about the potential A-fib ablation procedure which we discussed at length.  Given how asymptomatic he is I am not sure there is a need risk-benefit ratio and is favored to proceed with ablation procedure however this is something that we can reconsider.  I will continue current therapy and reevaluate     Follow up:   1 year with labs     I appreciate the opportunity to participate in the care of Sami MORGAN Faustino . Please do not hesitate to contact me with any further questions.  I have spent a total of 40 minutes today reviewing labs, imaging studies, discussing with colleagues, face-to-face time with patient and documentation in the medical record.  The longitudinal plan of care for the diagnosis(es)/condition(s) as documented were addressed during this visit. Due to the added complexity in care, I will continue to support Bill in the subsequent management and with ongoing continuity of care.    Sincerely,   Benji Cornelius MD  [facility] Division of Cardiology

## 2024-04-04 NOTE — NURSING NOTE
Chief Complaint   Patient presents with    Follow Up     1 year follow up with labs and echo prior      Vitals were taken and medications reconciled.    Rahul Cormier, EMT  1:17 PM

## 2024-04-04 NOTE — PATIENT INSTRUCTIONS
Dr. Cornelius recommends:    Follow up clinic visit with Dr. Cornelius in one year with labs the same day.    Thank you for your visit today.  Please call me with any questions or concerns.   Piyush Lopez RN  Cardiology Care Coordinator  [phone]

## 2024-04-05 ENCOUNTER — DOCUMENTATION ONLY (OUTPATIENT)
Dept: CARDIOLOGY | Facility: CLINIC | Age: 84
End: 2024-04-05
Payer: MEDICARE

## 2024-04-05 NOTE — PROGRESS NOTES
This patient has a future lab only appointment on 04/08/2024 and needs orders. The orders in place right now have an expected date of 2025. This lab can release orders only up to 2 weeks in advance of the expected date. Please send new orders or change the expected date on these orders. Thank you [facility]

## 2024-04-08 ENCOUNTER — CARE COORDINATION (OUTPATIENT)
Dept: CARDIOLOGY | Facility: CLINIC | Age: 84
End: 2024-04-08

## 2024-04-08 ENCOUNTER — LAB (OUTPATIENT)
Dept: LAB | Facility: CLINIC | Age: 84
End: 2024-04-08
Payer: MEDICARE

## 2024-04-08 DIAGNOSIS — I50.30 NYHA CLASS 3 HEART FAILURE WITH PRESERVED EJECTION FRACTION (H): ICD-10-CM

## 2024-04-08 DIAGNOSIS — I50.30 NYHA CLASS 3 HEART FAILURE WITH PRESERVED EJECTION FRACTION (H): Primary | ICD-10-CM

## 2024-04-08 LAB
ERYTHROCYTE [DISTWIDTH] IN BLOOD BY AUTOMATED COUNT: 13.4 % (ref 10–15)
HCT VFR BLD AUTO: 38.9 % (ref 40–53)
HGB BLD-MCNC: 13.1 G/DL (ref 13.3–17.7)
MCH RBC QN AUTO: 34.2 PG (ref 26.5–33)
MCHC RBC AUTO-ENTMCNC: 33.7 G/DL (ref 31.5–36.5)
MCV RBC AUTO: 102 FL (ref 78–100)
PLATELET # BLD AUTO: 147 10E3/UL (ref 150–450)
RBC # BLD AUTO: 3.83 10E6/UL (ref 4.4–5.9)
WBC # BLD AUTO: 7.6 10E3/UL (ref 4–11)

## 2024-04-08 PROCEDURE — 85027 COMPLETE CBC AUTOMATED: CPT

## 2024-04-08 PROCEDURE — 36415 COLL VENOUS BLD VENIPUNCTURE: CPT

## 2024-06-20 DIAGNOSIS — I10 BENIGN ESSENTIAL HYPERTENSION: ICD-10-CM

## 2024-06-20 DIAGNOSIS — E78.2 MIXED HYPERLIPIDEMIA: ICD-10-CM

## 2024-06-28 RX ORDER — METOPROLOL SUCCINATE 100 MG/1
150 TABLET, EXTENDED RELEASE ORAL DAILY
Qty: 135 TABLET | Refills: 2 | Status: SHIPPED | OUTPATIENT
Start: 2024-06-28

## 2024-06-28 NOTE — TELEPHONE ENCOUNTER
metoprolol succinate ER (TOPROL XL) 100 MG 24 hr tablet 135 tablet 0 4/2/2024 -- No   Sig - Route: Take 1.5 tablets (150 mg) by mouth daily      ----------------------  Last Office Visit : 4/4/2024  [facility] Office visit:  0  ----------------------      Routing refill request to provider for review/approval because:  BP Readings from Last 3 Encounters:   04/04/24 136/74   03/08/24 130/78   07/26/23 121/72

## 2024-10-30 ENCOUNTER — OFFICE VISIT (OUTPATIENT)
Dept: DERMATOLOGY | Facility: CLINIC | Age: 84
End: 2024-10-30
Payer: MEDICARE

## 2024-10-30 DIAGNOSIS — D22.9 MULTIPLE BENIGN NEVI: ICD-10-CM

## 2024-10-30 DIAGNOSIS — L81.4 LENTIGO: Primary | ICD-10-CM

## 2024-10-30 DIAGNOSIS — L57.0 ACTINIC KERATOSIS: ICD-10-CM

## 2024-10-30 DIAGNOSIS — Z85.828 HISTORY OF BASAL CELL CANCER: ICD-10-CM

## 2024-10-30 DIAGNOSIS — L82.1 SEBORRHEIC KERATOSES: ICD-10-CM

## 2024-10-30 DIAGNOSIS — D18.01 ANGIOMA OF SKIN: ICD-10-CM

## 2024-10-30 PROCEDURE — 99213 OFFICE O/P EST LOW 20 MIN: CPT | Mod: 25 | Performed by: PHYSICIAN ASSISTANT

## 2024-10-30 PROCEDURE — 17000 DESTRUCT PREMALG LESION: CPT | Performed by: PHYSICIAN ASSISTANT

## 2024-10-30 NOTE — PROGRESS NOTES
Sami Harmon is a pleasant 84 year old year old male patient here today for skin check. He denies any new or changing nevi. No painful or bleeding skin lesions. Patient has no other skin complaints today.  Remainder of the HPI, Meds, PMH, Allergies, FH, and SH was reviewed in chart.    Pertinent Hx:   History of BCC  Past Medical History:   Diagnosis Date    Acquired hypothyroidism 12/31/2018    Arthritis 2010    Essential hypertension, benign 2004    History of basal cell carcinoma     Macular degeneration 1980s    Paroxysmal atrial fibrillation (H) 12/31/2018    Pure hypercholesterolemia 2004    goal ldl <100       Past Surgical History:   Procedure Laterality Date    ABDOMEN SURGERY  1970's, 1980's    Two hernia surgerys    BIOPSY  07/2016    polyp removed from vocal cord - benign    CATARACT EXTRACTION, BILATERAL Bilateral 08/2023    At [facility]    COLONOSCOPY      COSMETIC SURGERY      EYE SURGERY  2000    Macular scar, presently being treated with avastin    HERNIA REPAIR, INGUINAL RT/LT  1970,1980    ORTHOPEDIC SURGERY  April 2015, June 2015    Right hip replacement, Left hip replacement    SURGICAL HISTORY OF -   2000    cholesteatoma removal L    SURGICAL HISTORY OF -   03/2004    laser macular    SURGICAL HISTORY OF -   04/2015    right hip replacement    TONSILLECTOMY & ADENOIDECTOMY  1961        Family History   Problem Relation Age of Onset    Arthritis Mother     Eye Disorder Mother     Diabetes Mother         type 2    Hypertension Mother     Cancer - colorectal Mother     Cerebrovascular Disease Mother     Osteoporosis Mother     Obesity Mother     Colon Cancer Mother     Heart Disease Father     Prostate Cancer Father     Cerebrovascular Disease Father     Heart Disease Brother     Prostate Cancer Brother     Coronary Artery Disease Brother     Gastrointestinal Disease Son     Prostate Cancer Brother        Social History     Socioeconomic History    Marital status:      Spouse name:  Meredith    Number of children: 4    Years of education: Not on file    Highest education level: Not on file   Occupational History    Occupation:      Employer: RETIRED   Tobacco Use    Smoking status: Never     Passive exposure: Past    Smokeless tobacco: Former     Types: Chew     Quit date: 12/12/2000    Tobacco comments:     Chewed tobacco for 30 years   Vaping Use    Vaping status: Never Used   Substance and Sexual Activity    Alcohol use: Yes     Comment: 1 or less drink per day    Drug use: No    Sexual activity: Not Currently     Partners: Female   Other Topics Concern    Parent/sibling w/ CABG, MI or angioplasty before 65F 55M? No     Service No    Blood Transfusions No    Caffeine Concern No    Occupational Exposure No    Hobby Hazards No    Sleep Concern No    Stress Concern No    Weight Concern No    Special Diet No    Back Care No    Exercise No    Bike Helmet No    Seat Belt Yes    Self-Exams Yes   Social History Narrative    Not on file     Social Drivers of Health     Financial Resource Strain: Low Risk  (3/1/2024)    Financial Resource Strain     Within the past 12 months, have you or your family members you live with been unable to get utilities (heat, electricity) when it was really needed?: No   Food Insecurity: Low Risk  (3/1/2024)    Food Insecurity     Within the past 12 months, did you worry that your food would run out before you got money to buy more?: No     Within the past 12 months, did the food you bought just not last and you didn t have money to get more?: No   Transportation Needs: Low Risk  (3/1/2024)    Transportation Needs     Within the past 12 months, has lack of transportation kept you from medical appointments, getting your medicines, non-medical meetings or appointments, work, or from getting things that you need?: No   Physical Activity: Insufficiently Active (3/1/2024)    Exercise Vital Sign     Days of Exercise per Week: 3 days     Minutes of Exercise per  Session: 30 min   Stress: No Stress Concern Present (3/1/2024)    [place] of Occupational [employer] - Occupational Stress Questionnaire     Feeling of Stress : Not at all   Social Connections: Unknown (3/1/2024)    Social Connection and Isolation Panel [NHANES]     Frequency of Communication with Friends and Family: Not on file     Frequency of Social Gatherings with Friends and Family: Once a week     Attends Church Services: Not on file     Active Member of Clubs or Organizations: Not on file     Attends Club or Organization Meetings: Not on file     Marital Status: Not on file   Interpersonal Safety: Low Risk  (3/8/2024)    Interpersonal Safety     Do you feel physically and emotionally safe where you currently live?: Yes     Within the past 12 months, have you been hit, slapped, kicked or otherwise physically hurt by someone?: No     Within the past 12 months, have you been humiliated or emotionally abused in other ways by your partner or ex-partner?: No   Housing Stability: Low Risk  (3/1/2024)    Housing Stability     Do you have housing? : Yes     Are you worried about losing your housing?: No       Outpatient Encounter Medications as of 10/30/2024   Medication Sig Dispense Refill    apixaban ANTICOAGULANT (ELIQUIS ANTICOAGULANT) 5 MG tablet Take 1 tablet (5 mg) by mouth 2 times daily 180 tablet 3    Ferrous Sulfate (IRON SUPPLEMENT PO) Take by mouth daily       latanoprost (XALATAN) 0.005 % ophthalmic solution Place 1 drop into both eyes daily 7.5 mL 3    levothyroxine (SYNTHROID/LEVOTHROID) 200 MCG tablet Take 1 tablet (200 mcg) by mouth daily 90 tablet 3    losartan (COZAAR) 100 MG tablet Take 1 tablet (100 mg) by mouth daily 90 tablet 3    lovastatin (MEVACOR) 40 MG tablet Take 1 tablet (40 mg) by mouth at bedtime 90 tablet 3    metoprolol succinate ER (TOPROL XL) 100 MG 24 hr tablet Take 1.5 tablets (150 mg) by mouth daily 135 tablet 2    MULTI-VITAMIN OR 1 tablets  daily      Psyllium  (METAMUCIL PO) Take by mouth daily 1 teaspoon added to 1 glass of water once daily       No facility-administered encounter medications on file as of 10/30/2024.             O:   NAD, WDWN, Alert & Oriented, Mood & Affect wnl, Vitals stable   Here today alone   There were no vitals taken for this visit.   General appearance normal   Vitals stable   Alert, oriented and in no acute distress     Stuck on papules and brown macules on trunk and ext   Red papules on trunk  Brown papules and macules with regular pigment network and borders on torso and extremities   Gritty papules on right nasal sidewall x 1   Pink circular scar on left calf   The remainder of skin exam is normal       Eyes: Conjunctivae/lids:Normal     ENT: Lips: normal    MSK:Normal    Cardiovascular: peripheral edema none    Pulm: Breathing Normal    Neuro/Psych: Orientation:Alert and Orientedx3 ; Mood/Affect:normal     A/P:  1. Actinic keratosis on right nsw x 1  LN2:  Treated with LN2 for 5s for 1-2 cycles. Warned risks of blistering, pain, pigment change, scarring, and incomplete resolution.  Advised patient to return if lesions do not completely resolve.  Wound care sheet given.  2. Seborrheic keratosis, lentigo, angioma, benign nevi, History of BCC  It was a pleasure speaking to Sami Harmon today.  BENIGN LESIONS DISCUSSED WITH PATIENT:  I discussed the specifics of tumor, prognosis, and genetics of benign lesions.  I explained that treatment of these lesions would be purely cosmetic and not medically neccessary.  I discussed with patient different removal options including excision, cautery and /or laser.      Nature and genetics of benign skin lesions dicussed with patient.  Signs and Symptoms of skin cancer discussed with patient.  ABCDEs of melanoma reviewed with patient.  Patient encouraged to perform monthly skin exams.  UV precautions reviewed with patient.  Risks of non-melanoma skin cancer discussed with patient   Return to clinic in  one year or sooner if needed.

## 2024-10-30 NOTE — LETTER
10/30/2024      Sami Harmon  [address] [place] [id number]      Dear Colleague,    Thank you for referring your patient, Sami Harmon, to the [facility]. Please see a copy of my visit note below.    Sami Harmon is a pleasant 84 year old year old male patient here today for skin check. He denies any new or changing nevi. No painful or bleeding skin lesions. Patient has no other skin complaints today.  Remainder of the HPI, Meds, PMH, Allergies, FH, and SH was reviewed in chart.    Pertinent Hx:   History of BCC  Past Medical History:   Diagnosis Date     Acquired hypothyroidism 12/31/2018     Arthritis 2010     Essential hypertension, benign 2004     History of basal cell carcinoma      Macular degeneration 1980s     Paroxysmal atrial fibrillation (H) 12/31/2018     Pure hypercholesterolemia 2004    goal ldl <100       Past Surgical History:   Procedure Laterality Date     ABDOMEN SURGERY  1970's, 1980's    Two hernia surgerys     BIOPSY  07/2016    polyp removed from vocal cord - benign     CATARACT EX[facility]ILATERAL [facility]23    [facility]     [facility]       COSMETIC SURGERY       EYE SURGERY  2000    Macular scar, presently being treated with avastin     HERNIA REPAIR, INGUINAL RT/LT  1970,1980     ORTHOPEDIC SURGERY  April 2015, June 2015    Right hip replacement, Left hip replacement     SURGICAL HISTORY OF -   2000    cholesteatoma removal L     SURGICAL HISTORY OF -   03/2004    laser macular     SURGICAL HISTORY OF -   04/2015    right hip replacement     TONSILLECTOMY & ADENOIDECTOMY  1961        Family History   Problem Relation Age of Onset     Arthritis Mother      Eye Disorder Mother      Diabetes Mother         type 2     Hypertension Mother      Cancer - colorectal Mother      Cerebrovascular Disease Mother      Osteoporosis Mother      Obesity Mother      Colon Cancer Mother      Heart Disease Father      Prostate Cancer Father       Cerebrovascular Disease Father      Heart Disease Brother      Prostate Cancer Brother      Coronary Artery Disease Brother      Gastrointestinal Disease Son      Prostate Cancer Brother        Social History     Socioeconomic History     Marital status:      Spouse name: Meredith     Number of children: 4     Years of education: Not on file     Highest education level: Not on file   Occupational History     Occupation:      Employer: RETIRED   Tobacco Use     Smoking status: Never     Passive exposure: Past     Smokeless tobacco: Former     Types: Chew     Quit date: 12/12/2000     Tobacco comments:     Chewed tobacco for 30 years   Vaping Use     Vaping status: Never Used   Substance and Sexual Activity     Alcohol use: Yes     Comment: 1 or less drink per day     Drug use: No     Sexual activity: Not Currently     Partners: Female   Other Topics Concern     Parent/sibling w/ CABG, MI or angioplasty before 65F 55M? No      Service No     Blood Transfusions No     Caffeine Concern No     Occupational Exposure No     Hobby Hazards No     Sleep Concern No     Stress Concern No     Weight Concern No     Special Diet No     Back Care No     Exercise No     Bike Helmet No     Seat Belt Yes     Self-Exams Yes   Social History Narrative     Not on file     Social Drivers of Health     Financial Resource Strain: Low Risk  (3/1/2024)    Financial Resource Strain      Within the past 12 months, have you or your family members you live with been unable to get utilities (heat, electricity) when it was really needed?: No   Food Insecurity: Low Risk  (3/1/2024)    Food Insecurity      Within the past 12 months, did you worry that your food would run out before you got money to buy more?: No      Within the past 12 months, did the food you bought just not last and you didn t have money to get more?: No   Transportation Needs: Low Risk  (3/1/2024)    Transportation Needs      Within the past 12 months,  has lack of transportation kept you from medical appointments, getting your medicines, non-medical meetings or appointments, work, or from getting things that you need?: No   Physical Activity: Insufficiently Active (3/1/2024)    Exercise Vital Sign      Days of Exercise per Week: 3 days      Minutes of Exercise per Session: 30 min   Stress: No Stress Concern Present (3/1/2024)    [place] of Occupational Health - Occupational Stress Questionnaire      Feeling of Stress : Not at all   Social Connections: Unknown (3/1/2024)    Social Connection and Isolation Panel [NHANES]      Frequency of Communication with Friends and Family: Not on file      Frequency of Social Gatherings with Friends and Family: Once a week      Attends Yazidi Services: Not on file      Active Member of Clubs or Organizations: Not on file      Attends Club or Organization Meetings: Not on file      Marital Status: Not on file   Interpersonal Safety: Low Risk  (3/8/2024)    Interpersonal Safety      Do you feel physically and emotionally safe where you currently live?: Yes      Within the past 12 months, have you been hit, slapped, kicked or otherwise physically hurt by someone?: No      Within the past 12 months, have you been humiliated or emotionally abused in other ways by your partner or ex-partner?: No   Housing Stability: Low Risk  (3/1/2024)    Housing Stability      Do you have housing? : Yes      Are you worried about losing your housing?: No       Outpatient Encounter Medications as of 10/30/2024   Medication Sig Dispense Refill     apixaban ANTICOAGULANT (ELIQUIS ANTICOAGULANT) 5 MG tablet Take 1 tablet (5 mg) by mouth 2 times daily 180 tablet 3     Ferrous Sulfate (IRON SUPPLEMENT PO) Take by mouth daily        latanoprost (XALATAN) 0.005 % ophthalmic solution Place 1 drop into both eyes daily 7.5 mL 3     levothyroxine (SYNTHROID/LEVOTHROID) 200 MCG tablet Take 1 tablet (200 mcg) by mouth daily 90 tablet 3     losartan  (COZAAR) 100 MG tablet Take 1 tablet (100 mg) by mouth daily 90 tablet 3     lovastatin (MEVACOR) 40 MG tablet Take 1 tablet (40 mg) by mouth at bedtime 90 tablet 3     metoprolol succinate ER (TOPROL XL) 100 MG 24 hr tablet Take 1.5 tablets (150 mg) by mouth daily 135 tablet 2     MULTI-VITAMIN OR 1 tablets  daily       Psyllium (METAMUCIL PO) Take by mouth daily 1 teaspoon added to 1 glass of water once daily       No facility-administered encounter medications on file as of 10/30/2024.             O:   NAD, WDWN, Alert & Oriented, Mood & Affect wnl, Vitals stable   Here today alone   There were no vitals taken for this visit.   General appearance normal   Vitals stable   Alert, oriented and in no acute distress     Stuck on papules and brown macules on trunk and ext   Red papules on trunk  Brown papules and macules with regular pigment network and borders on torso and extremities   Gritty papules on right nasal sidewall x 1   Pink circular scar on left calf   The remainder of skin exam is normal       Eyes: Conjunctivae/lids:Normal     ENT: Lips: normal    MSK:Normal    Cardiovascular: peripheral edema none    Pulm: Breathing Normal    Neuro/Psych: Orientation:Alert and Orientedx3 ; Mood/Affect:normal     A/P:  1. Actinic keratosis on right nsw x 1  LN2:  Treated with LN2 for 5s for 1-2 cycles. Warned risks of blistering, pain, pigment change, scarring, and incomplete resolution.  Advised patient to return if lesions do not completely resolve.  Wound care sheet given.  2. Seborrheic keratosis, lentigo, angioma, benign nevi, History of BCC  It was a pleasure speaking to Sami Harmon today.  BENIGN LESIONS DISCUSSED WITH PATIENT:  I discussed the specifics of tumor, prognosis, and genetics of benign lesions.  I explained that treatment of these lesions would be purely cosmetic and not medically neccessary.  I discussed with patient different removal options including excision, cautery and /or laser.      Nature  and genetics of benign skin lesions dicussed with patient.  Signs and Symptoms of skin cancer discussed with patient.  ABCDEs of melanoma reviewed with patient.  Patient encouraged to perform monthly skin exams.  UV precautions reviewed with patient.  Risks of non-melanoma skin cancer discussed with patient   Return to clinic in one year or sooner if needed.       Again, thank you for allowing me to participate in the care of your patient.        Sincerely,        Gilma Avalos PA-C

## 2024-12-13 NOTE — PROGRESS NOTES
December 21, 2018    Faustino Resendiz is an 77 yo gentleman with minimal past medical history including HTN, HL, who was recently seen by primary care provider and was noted to be in atrial fibrillation with controlled ventricular rate.  What he has been completely asymptomatic of his atrial fibrillation he was checking his blood pressure at home and he did notice that his blood pressure cuff shows irregular heart rate.  This prompted him asking his primary care provider about the regular heart rate and disease how his atrial fibrillation was discovered again he is completely symptomatically at this denies any chest pains any dizziness lightheadedness syncope episodes of palpitations.  He denies any other complaints at this time he also denies any history of stroke-like symptoms.    PAST MEDICAL HISTORY:  Past Medical History:   Diagnosis Date     Arthritis 2010     Essential hypertension, benign 2004     Macular degeneration 1980s     Pure hypercholesterolemia 2004    goal ldl <100     FAMILY HISTORY:  Family History   Problem Relation Age of Onset     Arthritis Mother      Eye Disorder Mother      Diabetes Mother         type 2     Hypertension Mother      Cancer - colorectal Mother      Cerebrovascular Disease Mother      Osteoporosis Mother      Obesity Mother      Colon Cancer Mother      Heart Disease Father      Prostate Cancer Father      Cerebrovascular Disease Father      Heart Disease Brother      Prostate Cancer Brother      Gastrointestinal Disease Son       SOCIAL HISTORY:  Social History     Socioeconomic History     Marital status:      Spouse name: Meredith     Number of children: 4     Years of education: Not on file     Highest education level: Not on file   Social Needs     Financial resource strain: Not on file     Food insecurity - worry: Not on file     Food insecurity - inability: Not on file     Transportation needs - medical: Not on file     Transportation needs - non-medical: Not on file    Occupational History     Occupation:      Employer: RETIRED   Tobacco Use     Smoking status: Passive Smoke Exposure - Never Smoker     Smokeless tobacco: Former User     Tobacco comment: 1 cigar daily   Substance and Sexual Activity     Alcohol use: Yes     Comment: 1 drink per day     Drug use: No     Sexual activity: No     Partners: Female   Other Topics Concern     Parent/sibling w/ CABG, MI or angioplasty before 65F 55M? No      Service No     Blood Transfusions No     Caffeine Concern No     Occupational Exposure No     Hobby Hazards No     Sleep Concern No     Stress Concern No     Weight Concern No     Special Diet No     Back Care No     Exercise No     Bike Helmet No     Seat Belt Yes     Self-Exams Yes   Social History Narrative     Not on file     CURRENT MEDICATIONS:  Current Outpatient Medications   Medication     apixaban ANTICOAGULANT (ELIQUIS) 5 MG tablet     Ferrous Sulfate (IRON SUPPLEMENT PO)     levothyroxine (SYNTHROID/LEVOTHROID) 50 MCG tablet     losartan (COZAAR) 100 MG tablet     lovastatin (MEVACOR) 40 MG tablet     LUTEIN 6 MG CAPS     MULTI-VITAMIN OR     OMEGA 3 1000 MG OR CAPS     Psyllium (METAMUCIL PO)     No current facility-administered medications for this visit.      ROS:   Constitutional: No fever, chills, or sweats. Weight is 0 lbs 0 oz  ENT: No visual disturbance, ear ache, epistaxis, sore throat.   Allergies/Immunologic: Negative.   Respiratory: No cough, hemoptysis.   Cardiovascular: As per HPI.   GI: No nausea, vomiting, hematemesis, melena, or hematochezia.   : No urinary frequency, dysuria, or hematuria.   Integrument: Negative.   Psychiatric: No evidence of major depression  Neuro: No new neurological complaints at this time. Non focal  Endocrinology: Negative.   Musculoskeletal: As per HPI.      EXAM:  /78 (BP Location: Left arm, Patient Position: Sitting, Cuff Size: Adult Large)   Pulse 77   Wt 112 kg (247 lb)   SpO2 97%   BMI 33.97  kg/m    General: appears comfortable, alert and oriented  Head: normocephalic, atraumatic  Eyes: anicteric sclera, EOMI , PERRL  Neck: no adenopathy  Orophyarynx: moist mucosa, no lesions noted  Heart: regular, S1/S2, no murmurs, rubs or gallop. Estimated JVP at 5 cmH2O  Lungs: CTAB, No wheezing.   Abdomen: soft, non-tender, bowel sounds present, no hepatosplenomegaly  Extremities: No LE edema today  Skin: no open lesions noted  Neuro: grossly non-focal  Psych: no evidence of depression noted     Labs:  Lab Results   Component Value Date    WBC 4.5 12/11/2018    HGB 12.4 (L) 12/11/2018    HCT 36.4 (L) 12/11/2018     12/11/2018     12/11/2018    POTASSIUM 4.8 12/11/2018    CHLORIDE 102 12/11/2018    CO2 27 12/11/2018    BUN 13 12/11/2018    CR 1.40 (H) 12/11/2018    GLC 87 12/11/2018    AST 38 08/26/2009    ALT 21 09/28/2011    INR 1.0 06/04/2015     Echocardiogram reviewed 12/2018:  Global and regional left ventricular function is normal with an EF of 55-60%.  The right ventricle is normal size. Global right ventricular function is  Normal. Mild aortic valve sclerosis is present. The proximal Asc Aorta measures 4.2cm in diameter. The aortic root index is 1.8cm/m2 (Normal index for males is 1.4-1.7cm/m2) No pericardial effusion is present.    ASSESSMENT AND PLAN:  In summary, patient is a 78 year old history of hypertension hyperlipidemia and recently diagnosed atrial fibrillation curette and apixaban who presented to the cardiology clinic to establish care for his atrial fibrillation.  He did have an echocardiogram which showed normal ejection fraction and he does not have any symptoms pertinent to heart failure.  He is currently not on any beta blockade but has appropriately been started on apixaban.  He is chadsVasc score is 3 given his age and hypertension this will put him at 3.2% annual risk of stroke with atrial fibrillation.  EKG in clinic today showed normal sinus rhythm with first-degree AV  block.  Normal heart rate.  He will continue his apixaban at this point given his paroxysmal episodes of atrial fibrillation.  It is unclear how long he is episodes last and how frequently does he does have the episodes.  I agree with apixaban 5 mg twice daily dosing however we have to monitor his renal function intermittently as apixaban dose should be reduced if 2 of the following criteria met 1) age above 80 years old, 2) weight less than 60 kg which is not, 3) creatinine about 1.5.  So if his creatinine reaches 1.5 or higher I would decrease his apixaban dose to 2.5 mg twice daily dosing.  Will start low-dose metoprolol at 12.5 mg twice daily dosing continue his antihypertensives.   We also will attest to see a patch monitor today to evaluate his atrial fibrillation burden and his rate control.  It is possible that with metoprolol and control of his thyroid function is atrial fibrillation burden will decrease significantly.  I will see him back in 3 months time and less frequently if subsequently if he remains times asymptomatic and well-controlled.     Follow up:   3-month    I appreciate the opportunity to participate in the care of Sami MORGAN Harmon . Please do not hesitate to contact me with any further questions.    Sincerely,    Benji Cornelius MD     [facility] Division of Cardiology    DVT ppx: Eliquis

## 2025-02-13 DIAGNOSIS — E78.5 HYPERLIPIDEMIA LDL GOAL <100: Primary | ICD-10-CM

## 2025-03-03 DIAGNOSIS — E03.9 ACQUIRED HYPOTHYROIDISM: ICD-10-CM

## 2025-03-03 DIAGNOSIS — I48.0 PAROXYSMAL ATRIAL FIBRILLATION (H): ICD-10-CM

## 2025-03-03 DIAGNOSIS — I10 HYPERTENSION GOAL BP (BLOOD PRESSURE) < 140/90: ICD-10-CM

## 2025-03-03 RX ORDER — LOSARTAN POTASSIUM 100 MG/1
100 TABLET ORAL DAILY
Qty: 90 TABLET | Refills: 3 | Status: SHIPPED | OUTPATIENT
Start: 2025-03-03

## 2025-03-03 RX ORDER — LEVOTHYROXINE SODIUM 200 UG/1
200 TABLET ORAL DAILY
Qty: 90 TABLET | Refills: 0 | Status: SHIPPED | OUTPATIENT
Start: 2025-03-03

## 2025-03-03 NOTE — TELEPHONE ENCOUNTER
[facility] faxed New Prescription Request for:  apixaban ANTICOAGULANT (ELIQUIS ANTICOAGULANT) 5 MG tablet    losartan (COZAAR) 100 MG tablet    levothyroxine (SYNTHROID/LEVOTHROID) 200 MCG tablet

## 2025-03-04 ENCOUNTER — OFFICE VISIT (OUTPATIENT)
Dept: CARDIOLOGY | Facility: CLINIC | Age: 85
End: 2025-03-04
Attending: INTERNAL MEDICINE
Payer: MEDICARE

## 2025-03-04 ENCOUNTER — LAB (OUTPATIENT)
Dept: LAB | Facility: CLINIC | Age: 85
End: 2025-03-04
Payer: MEDICARE

## 2025-03-04 VITALS
OXYGEN SATURATION: 96 % | DIASTOLIC BLOOD PRESSURE: 89 MMHG | BODY MASS INDEX: 37.38 KG/M2 | HEART RATE: 65 BPM | WEIGHT: 268 LBS | SYSTOLIC BLOOD PRESSURE: 146 MMHG

## 2025-03-04 DIAGNOSIS — E78.2 MIXED HYPERLIPIDEMIA: ICD-10-CM

## 2025-03-04 DIAGNOSIS — E78.5 HYPERLIPIDEMIA LDL GOAL <100: ICD-10-CM

## 2025-03-04 DIAGNOSIS — I50.30 NYHA CLASS 3 HEART FAILURE WITH PRESERVED EJECTION FRACTION (H): ICD-10-CM

## 2025-03-04 DIAGNOSIS — E66.9 OBESITY (BMI 30-39.9): Primary | ICD-10-CM

## 2025-03-04 DIAGNOSIS — I10 BENIGN ESSENTIAL HYPERTENSION: ICD-10-CM

## 2025-03-04 LAB
ALBUMIN SERPL BCG-MCNC: 4.1 G/DL (ref 3.5–5.2)
ALP SERPL-CCNC: 67 U/L (ref 40–150)
ALT SERPL W P-5'-P-CCNC: 24 U/L (ref 0–70)
ANION GAP SERPL CALCULATED.3IONS-SCNC: 10 MMOL/L (ref 7–15)
AST SERPL W P-5'-P-CCNC: 29 U/L (ref 0–45)
BILIRUB SERPL-MCNC: 1 MG/DL
BUN SERPL-MCNC: 15.6 MG/DL (ref 8–23)
CALCIUM SERPL-MCNC: 9.5 MG/DL (ref 8.8–10.4)
CHLORIDE SERPL-SCNC: 99 MMOL/L (ref 98–107)
CREAT SERPL-MCNC: 1.39 MG/DL (ref 0.67–1.17)
EGFRCR SERPLBLD CKD-EPI 2021: 49 ML/MIN/1.73M2
ERYTHROCYTE [DISTWIDTH] IN BLOOD BY AUTOMATED COUNT: 13.4 % (ref 10–15)
GLUCOSE SERPL-MCNC: 101 MG/DL (ref 70–99)
HCO3 SERPL-SCNC: 27 MMOL/L (ref 22–29)
HCT VFR BLD AUTO: 39.3 % (ref 40–53)
HGB BLD-MCNC: 13.4 G/DL (ref 13.3–17.7)
MCH RBC QN AUTO: 33.8 PG (ref 26.5–33)
MCHC RBC AUTO-ENTMCNC: 34.1 G/DL (ref 31.5–36.5)
MCV RBC AUTO: 99 FL (ref 78–100)
NT-PROBNP SERPL-MCNC: 2381 PG/ML (ref 0–1800)
PLATELET # BLD AUTO: 151 10E3/UL (ref 150–450)
POTASSIUM SERPL-SCNC: 5.1 MMOL/L (ref 3.4–5.3)
PROT SERPL-MCNC: 7.4 G/DL (ref 6.4–8.3)
RBC # BLD AUTO: 3.97 10E6/UL (ref 4.4–5.9)
SODIUM SERPL-SCNC: 136 MMOL/L (ref 135–145)
WBC # BLD AUTO: 7 10E3/UL (ref 4–11)

## 2025-03-04 PROCEDURE — G0463 HOSPITAL OUTPT CLINIC VISIT: HCPCS | Performed by: INTERNAL MEDICINE

## 2025-03-04 PROCEDURE — 85027 COMPLETE CBC AUTOMATED: CPT | Performed by: PATHOLOGY

## 2025-03-04 PROCEDURE — 83880 ASSAY OF NATRIURETIC PEPTIDE: CPT | Performed by: PATHOLOGY

## 2025-03-04 PROCEDURE — 36415 COLL VENOUS BLD VENIPUNCTURE: CPT | Performed by: PATHOLOGY

## 2025-03-04 PROCEDURE — 1126F AMNT PAIN NOTED NONE PRSNT: CPT | Performed by: INTERNAL MEDICINE

## 2025-03-04 PROCEDURE — 99215 OFFICE O/P EST HI 40 MIN: CPT | Performed by: INTERNAL MEDICINE

## 2025-03-04 PROCEDURE — 3077F SYST BP >= 140 MM HG: CPT | Performed by: INTERNAL MEDICINE

## 2025-03-04 PROCEDURE — 80053 COMPREHEN METABOLIC PANEL: CPT | Performed by: PATHOLOGY

## 2025-03-04 PROCEDURE — 3078F DIAST BP <80 MM HG: CPT | Performed by: INTERNAL MEDICINE

## 2025-03-04 PROCEDURE — G2211 COMPLEX E/M VISIT ADD ON: HCPCS | Performed by: INTERNAL MEDICINE

## 2025-03-04 ASSESSMENT — PAIN SCALES - GENERAL: PAINLEVEL_OUTOF10: NO PAIN (0)

## 2025-03-04 NOTE — PROGRESS NOTES
March 4, 2025     Faustino Resendiz is an 85 yo gentleman with minimal past medical history including HTN, HL seen by primary care provider and then by myself for atrial fibrillation. He was checking his blood pressure at home and he did notice that his blood pressure cuff shows irregular heart rate.  This prompted him asking his primary care provider about the regular heart rate and disease how his atrial fibrillation was discovered again he is completely asymptomatic. He then came to cardiology clinic, was in sinus rhythm. Was started on low dose metoprolol and Ziopatch has been placed as results as below confirming paroxysmal atrial fibrillation, rate controlled with a burden of 62%. Today he returns to follow up.  Overall he feels relatively well no new complaints or issues.  He is still active able to do essentially what he would want to do.  He can mow the lawn and he can also clean the driveway of snow.  No complaints overall no dizziness lightheadedness or falls.  No bleeding.    PAST MEDICAL HISTORY:  Past Medical History:   Diagnosis Date    Acquired hypothyroidism 12/31/2018    Arthritis 2010    Essential hypertension, benign 2004    History of basal cell carcinoma     Macular degeneration 1980s    Paroxysmal atrial fibrillation (H) 12/31/2018    Pure hypercholesterolemia 2004    goal ldl <100     FAMILY HISTORY:  Family History   Problem Relation Age of Onset    Arthritis Mother     Eye Disorder Mother     Diabetes Mother         type 2    Hypertension Mother     Cancer - colorectal Mother     Cerebrovascular Disease Mother     Osteoporosis Mother     Obesity Mother     Colon Cancer Mother     Heart Disease Father     Prostate Cancer Father     Cerebrovascular Disease Father     Heart Disease Brother     Prostate Cancer Brother     Coronary Artery Disease Brother     Gastrointestinal Disease Son     Prostate Cancer Brother      SOCIAL HISTORY:  Social History     Socioeconomic History    Marital status:       Spouse name: Meredith    Number of children: 4   Occupational History    Occupation:      Employer: RETIRED   Tobacco Use    Smoking status: Never     Passive exposure: Past    Smokeless tobacco: Former     Types: Chew     Quit date: 12/12/2000    Tobacco comments:     Chewed tobacco for 30 years   Vaping Use    Vaping status: Never Used   Substance and Sexual Activity    Alcohol use: Yes     Comment: 1 or less drink per day    Drug use: No    Sexual activity: Not Currently     Partners: Female   Other Topics Concern    Parent/sibling w/ CABG, MI or angioplasty before 65F 55M? No     Service No    Blood Transfusions No    Caffeine Concern No    Occupational Exposure No    Hobby Hazards No    Sleep Concern No    Stress Concern No    Weight Concern No    Special Diet No    Back Care No    Exercise No    Bike Helmet No    Seat Belt Yes    Self-Exams Yes     Social Drivers of Health     Financial Resource Strain: Low Risk  (3/1/2024)    Financial Resource Strain     Within the past 12 months, have you or your family members you live with been unable to get utilities (heat, electricity) when it was really needed?: No   Food Insecurity: Low Risk  (3/1/2024)    Food Insecurity     Within the past 12 months, did you worry that your food would run out before you got money to buy more?: No     Within the past 12 months, did the food you bought just not last and you didn t have money to get more?: No   Transportation Needs: Low Risk  (3/1/2024)    Transportation Needs     Within the past 12 months, has lack of transportation kept you from medical appointments, getting your medicines, non-medical meetings or appointments, work, or from getting things that you need?: No   Physical Activity: Insufficiently Active (3/1/2024)    Exercise Vital Sign     Days of Exercise per Week: 3 days     Minutes of Exercise per Session: 30 min   Stress: No Stress Concern Present (3/1/2024)    [employer] [place] [employer] - Occupational Stress Questionnaire     Feeling of Stress : Not at all   Social Connections: Unknown (3/1/2024)    Social Connection and Isolation Panel [NHANES]     Frequency of Social Gatherings with Friends and Family: Once a week   Interpersonal Safety: Low Risk  (3/8/2024)    Interpersonal Safety     Do you feel physically and emotionally safe where you currently live?: Yes     Within the past 12 months, have you been hit, slapped, kicked or otherwise physically hurt by someone?: No     Within the past 12 months, have you been humiliated or emotionally abused in other ways by your partner or ex-partner?: No   Housing Stability: Low Risk  (3/1/2024)    Housing Stability     Do you have housing? : Yes     Are you worried about losing your housing?: No     CURRENT MEDICATIONS:  Current Outpatient Medications   Medication Sig Dispense Refill    apixaban ANTICOAGULANT (ELIQUIS ANTICOAGULANT) 5 MG tablet Take 1 tablet (5 mg) by mouth 2 times daily. 180 tablet 3    Ferrous Sulfate (IRON SUPPLEMENT PO) Take by mouth daily       latanoprost (XALATAN) 0.005 % ophthalmic solution Place 1 drop into both eyes daily 7.5 mL 3    levothyroxine (SYNTHROID/LEVOTHROID) 200 MCG tablet Take 1 tablet (200 mcg) by mouth daily. 90 tablet 0    losartan (COZAAR) 100 MG tablet Take 1 tablet (100 mg) by mouth daily. 90 tablet 3    lovastatin (MEVACOR) 40 MG tablet Take 1 tablet (40 mg) by mouth at bedtime 90 tablet 3    metoprolol succinate ER (TOPROL XL) 100 MG 24 hr tablet Take 1.5 tablets (150 mg) by mouth daily 135 tablet 2    MULTI-VITAMIN OR 1 tablets  daily      Psyllium (METAMUCIL PO) Take by mouth daily 1 teaspoon added to 1 glass of water once daily       No current facility-administered medications for this visit.     EXAM:  BP (!) 151/90 (BP Location: Right arm, Patient Position: Chair, Cuff Size: Adult Large)   Pulse 71   Wt 121.6 kg (268 lb)   SpO2 96%   BMI 37.38 kg/m    General: appears comfortable, alert and  oriented  Head: normocephalic, atraumatic  Eyes: anicteric sclera, EOMI , PERRL  Neck: no adenopathy  Orophyarynx: moist mucosa, no lesions noted  Heart: regular, S1/S2, no murmurs, rubs or gallop. Estimated JVP at 5 cmH2O  Lungs: CTAB, No wheezing.   Abdomen: soft, non-tender, bowel sounds present, no hepatosplenomegaly  Extremities: No LE edema today  Skin: no open lesions noted  Neuro: grossly non-focal  Psych: no evidence of depression noted     Labs:  Lab Results   Component Value Date    WBC 7.6 04/08/2024    HGB 13.1 (L) 04/08/2024    HCT 38.9 (L) 04/08/2024     (L) 04/08/2024     02/29/2024    POTASSIUM 4.2 02/29/2024    CHLORIDE 101 02/29/2024    CO2 26 02/29/2024    BUN 16.5 02/29/2024    CR 1.34 (H) 02/29/2024    GLC 99 02/29/2024    NTBNP 2,167 (H) 02/24/2023    AST 42 02/29/2024    ALT 42 02/29/2024    ALKPHOS 80 02/29/2024    BILITOTAL 1.0 02/29/2024    INR 1.0 06/04/2015      Echocardiogram reviewed 12/2018:  Global and regional left ventricular function is normal with an EF of 55-60%.  The right ventricle is normal size. Global right ventricular function is  Normal. Mild aortic valve sclerosis is present. The proximal Asc Aorta measures 4.2cm in diameter. The aortic root index is 1.8cm/m2 (Normal index for males is 1.4-1.7cm/m2) No pericardial effusion is present.     Ziopatch 3/2019:   62% a-fib burden, mostly rate controlled.      TTE 2/25/22  Technically difficult study.Extremely poor acoustic windows. Global and regional left ventricular function is normal with an EF of 55-60%. Diastolic function not assessed due to atrial fibrillation.  Right ventricular function, chamber size, wall motion, and thickness are normal.  Pulmonary artery systolic pressure is normal.  Sinuses of Valsalva 4.3 cm.  Ascending aorta 4.3 cm.  The inferior vena cava cannot be assessed. No pericardial effusion is present.  No significant changes noted.     TTE 2/2023:  Global and regional left ventricular  function is normal with an EF of 55-60%.  Global right ventricular function is normal.  No significant valvular abnormalities present.  Aorta dilatation present. Sinuses of Valsalva 4.5 cm. Ascending aorta 4.3 cm. No pericardial effusion is present.    ASSESSMENT AND PLAN:  In summary, patient is a 84 year old history of hypertension hyperlipidemia and paroxysmal atrial fibrillation on apixaban who presented to the cardiology clinic to follow up.    Patient remains in atrial fibrillation however he is heart rate is well controlled.  His blood pressure is also pretty well controlled at this time.  He has no complications from apixaban use tolerated well.    Feeling well no new complaints or issues.  No palpitations blood pressure is a little bit elevated here in clinic today however it is always in the normal range at home as we reviewed for the past week at least.  This might be related to the A-fib and when the measurement is taken.  As such we are not going to make any medication changes.  Will see him back in 1 year with echo and labs at that time.    I appreciate the opportunity to participate in the care of Sami Harmon . Please do not hesitate to contact me with any further questions.  I have spent a total of 40 minutes today reviewing labs, imaging studies, discussing with colleagues, face-to-face time with patient and documentation in the medical record.  The longitudinal plan of care for the diagnosis(es)/condition(s) as documented were addressed during this visit. Due to the added complexity in care, I will continue to support Bill in the subsequent management and with ongoing continuity of care.    Sincerely,   Benji Cornelius MD  [facility] Division of Cardiology

## 2025-03-04 NOTE — PATIENT INSTRUCTIONS
Student's Name:   Beverley Hwang Birth Date  2009  Sex  female  Race/Ethnicity  [employer]/Grade Level/ID#     Address:   [address] [place] [id number]    ________________________________          _________________          ___________________  Parent/Guardian                                               Telephone# Home:             Work:   HEALTH HISTORY: MUST BE COMPLETED AND SIGNED BY PARENT/GUARDIAN AND VERIFIED BY HEALTH CARE PROVIDER  ALLERGIES: (Food, drug, insect, other) is allergic to amoxicillin.   MEDICATION: (List all prescribed or taken on a regular basis.) has a current medication list which includes the following prescription(s): Omega-3 Fatty Acids (omega-3 fish oil) 1000 MG capsule.   Diagnosis of asthma?   [] Yes   [] No  Loss of function of one of paired  organs?(eye/ear/kidney/testicle) [] Yes   [] No    Child wakes during night coughing? [] Yes   [] No  Hospitalizations? [] Yes   [] No    Birth defects? [] Yes   [] No       When? What for?     Developmental delay? [] Yes   [] No  Surgery? (List all.)  [] Yes   [] No    Blood disorders? Hemophilia,  Sickle Cell, Other? Explain. [] Yes   [] No       When? What for?     Diabetes? [] Yes   [] No  Serious injury or illness? [] Yes   [] No    Head injury/Concussion/Passed out? [] Yes   [] No  TB skin test positive (past/present)? * [] Yes   [] No *If yes, refer to local [facility] dept   Seizures? What are they like?  [] Yes   [] No  TB disease (past or present)? * [] Yes   [] No *If yes, refer to local [facility] dept   Heart problem/Shortness of breath?  [] Yes   [] No  Tobacco use (type, frequency)?  [] Yes   [] No    Heart murmur/High blood pressure? [] Yes   [] No  Alcohol/Drug use?  [] Yes   [] No    Dizziness or chest pain with exercise? [] Yes   [] No  Family history of sudden death  before age 50? (Cause?) [] Yes   [] No    Eye/Vision problems? _____           [] Glasses          [] Contacts  Last exam by eye doctor ______  Other  Dr. Cornelius recommends:    Follow up clinic visit with Dr. Cornelius in one year with an echocardiogram and labs prior.    Thank you for your visit today.  Please call me with any questions or concerns.   Piyush Lopez RN  Cardiology Care Coordinator  [phone]    concerns? (crossed eye, drooping lids, squinting, difficulty reading) []  Dental    []  Braces    [] Bridge    []  Plate    []  Other    Additional information:   Ear/Hearing problems? [] Yes  [] No  Information may be shared with appropriate personnel for health and educational purposes.   Bone/Joint problem/injury/scoliosis? [] Yes  [] No  Parent/Guardian  Signatures:                                                                         Date   IMMUNIZATIONS: To be completed by health care provider. The mo/da/yr for every dose administered is required. If a specific vaccine is medically contraindicated, a separate written statement must be attached by the health care responsible for completing the health examination explaining the medical reason for the contraindication.   REQUIRED Vaccine/Dose  VACCINE/DOSE DATE DATE DATE DATE DATE   DTP or DTaP 2007 2/21/2008 4/28/2008 4/20/2009 8/13/2012   Tdap 12/26/2018       Polio (IPV) 2007 2/21/2008 4/28/2008 8/13/2012    Hib 2007 2/21/2008 4/28/2008 3/12/2009    Pneumococcal Conjugate 2007 2/21/2008 4/28/2008 3/12/2009    Hep B 2007 2009 2/21/2008 4/28/2008    Hep A/Hep B        MMR 9/23/2008 8/13/2012      Measles        Mumps        Rubella        Varicella 9/23/2008 8/13/2012      Meningococcal conjugate (MCV4) 12/26/2018 8/12/2024      Menigococcal B         RECOMMENDED, BUT NOT REQUIRED Vaccine/Dose  VACCINE/DOSE DATE DATE   Hepatitis A 4/20/2009 10/12/2009   HPV     Influenza     COVID         Health care provider (MD, DO, APN, PA, school health professional, health official) verifying above immunization history must sign below. If adding dates to the above immunization history section, put your initials by date(s) and sign here.)  Electronically Signed by: Angle Kim MD                                                    Date: 8/12/2024   Printed by Authority of the [facility] (COMPLETE BOTH SIDES)          12/23                 [facility]          [facility]      Certificates of Restoration Exemption to Immunizations or Physician Medical Statements of Medical Contraindication Are Reviewed and Maintained by the School Authority.  ALTERNATIVE PROOF OF IMMUNITY   1. Clinical diagnosis (measles, mumps, hepatitis B) is allowed when verified by physician and supported with lab confirmation.  Attach copy of lab result.    * MEASLES (Rubeola)  MO   DA  YR          **MUMPS  MO   DA  YR             HEPATITIS B  MO   DA   YR           VARICELLA  MO   DA  YR      2. History of varicella (chickenpox) disease is acceptable if verified by health care provider, school health professional or health official. Person signing below verifies that the parent/guardian’s description of varicella disease history is indicative of past infection and is accepting such history as documentation of disease.  Date of Disease                                  Signature                                                           Title   3. Laboratory Evidence of Immunity (check one)      [] Measles*     [] Mumps**     []Rubella     [] Varicella   (Attach copy of lab result)         *All measles cases diagnosed on or after July 1, 2002, must be confirmed by laboratory evidence.      **All mumps cases diagnosed on or after July 1, 2013, must be confirmed by laboratory evidence.   Physician Statements of Immunity MUST be submitted to [facility] for review.  Completion of Alternative 1 or 3 MUST be accompanied by Labs & Physician Signature: ___________________________   PHYSICAL EXAMINATION REQUIREMENTS   Entire section below to be completed by MD/DO/APN/PA   Head Circumference if <2-3 years old - /73 (BP Location: LUE - Left upper extremity, Patient Position: Sitting, Cuff Size: Regular)   Pulse 88   Temp 98.7 °F (37.1 °C) (Temporal)   Resp 18   Ht 5' 3.39\" (1.61 m)   Wt 53 kg (116 lb 13.5 oz)   LMP 07/29/2024 (Exact Date)   BMI 20.45 kg/m²   BSA  1.55 m²    44.5 %ile (Z= -0.14) based on CDC (Girls, 2-20 Years) BMI-for-age based on BMI available as of 8/12/2024.   DIABETES SCREENING (NOT REQUIRED FOR ) BMI>85% age/sex: No     And any two of the following: Family History: No  Ethnic Minority: No    Signs of Insulin Resistance (hypertension, dyslipidemia, polycystic ovarian syndrome, acanthosis nigricans): No  At Risk: No   LEAD RISK QUESTIONNAIRE Required for children age 6 months through 6 years enrolled in licensed or public school operated day care, , nursery school and/or . (Blood test required if resides in [place] or high risk zip code.)  Questionnaire Administered? No  Blood Test Indicated? No   Blood Test Date/Result:    TB SKIN OR BLOOD TEST Recommended only for children in high-risk groups including children immunosuppressed due to HIV infection or other conditions, frequent travel to or born in high prevalence countries or those exposed to adults in high-risk categories. See CDC guidelines. http://www.cdc.gov/tb/publications/factsheets/testing/TB_testing.htm.  Test Needed: No     Test performed: No                          Skin Test:    Date Read              /      /             Result:   Positive__      Negative__        mm________                          Blood Test: Date Reported       /      /               Result:  Positive__      Negative__      Value________  LAB TESTS (recommended) Date/Result SCREENINGS Date/Results   Hemoglobin or Hematocrit 12.8 (4/4/2023) Developmental Screening Tool N/A     Urinalysis NA Social and Emotional Screening N/A   Sickle Cell (when indicated)  Other:         SYSTEM REVIEW Normal  Comments/Follow-up/Needs  Normal Comments/Follow-up/Needs   Skin  Yes  Endocrine Yes    Ears Yes                  Screening Result Gastrointestinal Yes    Eyes Yes                  Screening Result: Genito-Urinary Yes                                      LMP: 7/29/2024   Nose Yes  Neurologic Yes    Throat  Yes  Musculoskeletal Yes    Mouth/Dental Yes  Spinal Exam Yes    Cardiovascular/HTN Yes  Nutritional status Yes    Respiratory Yes Diagnosis of Asthma: No   Mental Health Yes    Currently Prescribed Asthma Medication:        No  Quick-relief medication (e.g. Short Acting Beta Antagonist)        No  Controller medication (e.g. inhaled corticosteroid) Other     NEEDS/MODIFICATIONS required in the school setting: No restrictions DIETARY Needs/Restrictions: No restrictions   SPECIAL INSTRUCTIONS/DEVICES e.g. safety glasses, glass eye, chest protector for arrhythmia, pacemaker, prosthetic device, dental bridge, false teeth, athletic support/cup: No restrictions   MENTAL HEALTH/OTHER Is there anything else the school should know about this student?  If you would like to discuss this student’s health with school or school health personnel:  Not needed   EMERGENCY ACTION needed while at school due to child’s health condition (e.g. ,seizures, asthma, insect sting, food, peanut allergy, bleeding problem, diabetes, heart problem)?   No   On the basis of the examination on this day, I approve this child’s participation in                                (If No or Modified please attach explanation.)  PHYSICAL EDUCATION:  Yes                               INTERSCHOLASTIC SPORTS   Yes   Electronically Signed by: Angle Kim MD                                                    Date: 8/12/2024   Address:  [facility] [place] [address]  [facility] [place] [address]  [address]  [facility] [address]  [phone]   Printed by Authority of the [facility] (COMPLETE BOTH SIDES)          12/23                [facility]          Approved [facility] 5/2024

## 2025-03-04 NOTE — LETTER
3/4/2025      RE: Sami Harmon  [address] [place] [id number]       Dear Colleague,    Thank you for the opportunity to participate in the care of your patient, Sami Harmon, at the [facility]. Please see a copy of my visit note below.    March 4, 2025     Faustino Resendiz is an 83 yo gentleman with minimal past medical history including HTN, HL seen by primary care provider and then by myself for atrial fibrillation. He was checking his blood pressure at home and he did notice that his blood pressure cuff shows irregular heart rate.  This prompted him asking his primary care provider about the regular heart rate and disease how his atrial fibrillation was discovered again he is completely asymptomatic. He then came to cardiology clinic, was in sinus rhythm. Was started on low dose metoprolol and Ziopatch has been placed as results as below confirming paroxysmal atrial fibrillation, rate controlled with a burden of 62%. Today he returns to follow up.  Overall he feels relatively well no new complaints or issues.  He is still active able to do essentially what he would want to do.  He can mow the lawn and he can also clean the driveway of snow.  No complaints overall no dizziness lightheadedness or falls.  No bleeding.    PAST MEDICAL HISTORY:  Past Medical History:   Diagnosis Date     Acquired hypothyroidism 12/31/2018     Arthritis 2010     Essential hypertension, benign 2004     History of basal cell carcinoma      Macular degeneration 1980s     Paroxysmal atrial fibrillation (H) 12/31/2018     Pure hypercholesterolemia 2004    goal ldl <100     FAMILY HISTORY:  Family History   Problem Relation Age of Onset     Arthritis Mother      Eye Disorder Mother      Diabetes Mother         type 2     Hypertension Mother      Cancer - colorectal Mother      Cerebrovascular Disease Mother      Osteoporosis Mother       Obesity Mother      Colon Cancer Mother      Heart Disease Father      Prostate Cancer Father      Cerebrovascular Disease Father      Heart Disease Brother      Prostate Cancer Brother      Coronary Artery Disease Brother      Gastrointestinal Disease Son      Prostate Cancer Brother      SOCIAL HISTORY:  Social History     Socioeconomic History     Marital status:      Spouse name: Meredith     Number of children: 4   Occupational History     Occupation:      Employer: RETIRED   Tobacco Use     Smoking status: Never     Passive exposure: Past     Smokeless tobacco: Former     Types: Chew     Quit date: 12/12/2000     Tobacco comments:     Chewed tobacco for 30 years   Vaping Use     Vaping status: Never Used   Substance and Sexual Activity     Alcohol use: Yes     Comment: 1 or less drink per day     Drug use: No     Sexual activity: Not Currently     Partners: Female   Other Topics Concern     Parent/sibling w/ CABG, MI or angioplasty before 65F 55M? No      Service No     Blood Transfusions No     Caffeine Concern No     Occupational Exposure No     Hobby Hazards No     Sleep Concern No     Stress Concern No     Weight Concern No     Special Diet No     Back Care No     Exercise No     Bike Helmet No     Seat Belt Yes     Self-Exams Yes     Social Drivers of Health     Financial Resource Strain: Low Risk  (3/1/2024)    Financial Resource Strain      Within the past 12 months, have you or your family members you live with been unable to get utilities (heat, electricity) when it was really needed?: No   Food Insecurity: Low Risk  (3/1/2024)    Food Insecurity      Within the past 12 months, did you worry that your food would run out before you got money to buy more?: No      Within the past 12 months, did the food you bought just not last and you didn t have money to get more?: No   Transportation Needs: Low Risk  (3/1/2024)    Transportation Needs      Within the past 12 months, has lack  of transportation kept you from medical appointments, getting your medicines, non-medical meetings or appointments, work, or from getting things that you need?: No   Physical Activity: Insufficiently Active (3/1/2024)    Exercise Vital Sign      Days of Exercise per Week: 3 days      Minutes of Exercise per Session: 30 min   Stress: No Stress Concern Present (3/1/2024)    [employer] [place] [employer] - Occupational Stress Questionnaire      Feeling of Stress : Not at all   Social Connections: Unknown (3/1/2024)    Social Connection and Isolation Panel [NHANES]      Frequency of Social Gatherings with Friends and Family: Once a week   Interpersonal Safety: Low Risk  (3/8/2024)    Interpersonal Safety      Do you feel physically and emotionally safe where you currently live?: Yes      Within the past 12 months, have you been hit, slapped, kicked or otherwise physically hurt by someone?: No      Within the past 12 months, have you been humiliated or emotionally abused in other ways by your partner or ex-partner?: No   Housing Stability: Low Risk  (3/1/2024)    Housing Stability      Do you have housing? : Yes      Are you worried about losing your housing?: No     CURRENT MEDICATIONS:  Current Outpatient Medications   Medication Sig Dispense Refill     apixaban ANTICOAGULANT (ELIQUIS ANTICOAGULANT) 5 MG tablet Take 1 tablet (5 mg) by mouth 2 times daily. 180 tablet 3     Ferrous Sulfate (IRON SUPPLEMENT PO) Take by mouth daily        latanoprost (XALATAN) 0.005 % ophthalmic solution Place 1 drop into both eyes daily 7.5 mL 3     levothyroxine (SYNTHROID/LEVOTHROID) 200 MCG tablet Take 1 tablet (200 mcg) by mouth daily. 90 tablet 0     losartan (COZAAR) 100 MG tablet Take 1 tablet (100 mg) by mouth daily. 90 tablet 3     lovastatin (MEVACOR) 40 MG tablet Take 1 tablet (40 mg) by mouth at bedtime 90 tablet 3     metoprolol succinate ER (TOPROL XL) 100 MG 24 hr tablet Take 1.5 tablets (150 mg) by mouth daily  135 tablet 2     MULTI-VITAMIN OR 1 tablets  daily       Psyllium (METAMUCIL PO) Take by mouth daily 1 teaspoon added to 1 glass of water once daily       No current facility-administered medications for this visit.     EXAM:  BP (!) 151/90 (BP Location: Right arm, Patient Position: Chair, Cuff Size: Adult Large)   Pulse 71   Wt 121.6 kg (268 lb)   SpO2 96%   BMI 37.38 kg/m    General: appears comfortable, alert and oriented  Head: normocephalic, atraumatic  Eyes: anicteric sclera, EOMI , PERRL  Neck: no adenopathy  Orophyarynx: moist mucosa, no lesions noted  Heart: regular, S1/S2, no murmurs, rubs or gallop. Estimated JVP at 5 cmH2O  Lungs: CTAB, No wheezing.   Abdomen: soft, non-tender, bowel sounds present, no hepatosplenomegaly  Extremities: No LE edema today  Skin: no open lesions noted  Neuro: grossly non-focal  Psych: no evidence of depression noted     Labs:  Lab Results   Component Value Date    WBC 7.6 04/08/2024    HGB 13.1 (L) 04/08/2024    HCT 38.9 (L) 04/08/2024     (L) 04/08/2024     02/29/2024    POTASSIUM 4.2 02/29/2024    CHLORIDE 101 02/29/2024    CO2 26 02/29/2024    BUN 16.5 02/29/2024    CR 1.34 (H) 02/29/2024    GLC 99 02/29/2024    NTBNP 2,167 (H) 02/24/2023    AST 42 02/29/2024    ALT 42 02/29/2024    ALKPHOS 80 02/29/2024    BILITOTAL 1.0 02/29/2024    INR 1.0 06/04/2015      Echocardiogram reviewed 12/2018:  Global and regional left ventricular function is normal with an EF of 55-60%.  The right ventricle is normal size. Global right ventricular function is  Normal. Mild aortic valve sclerosis is present. The proximal Asc Aorta measures 4.2cm in diameter. The aortic root index is 1.8cm/m2 (Normal index for males is 1.4-1.7cm/m2) No pericardial effusion is present.     Ziopatch 3/2019:   62% a-fib burden, mostly rate controlled.      TTE 2/25/22  Technically difficult study.Extremely poor acoustic windows. Global and regional left ventricular function is normal with an EF  of 55-60%. Diastolic function not assessed due to atrial fibrillation.  Right ventricular function, chamber size, wall motion, and thickness are normal.  Pulmonary artery systolic pressure is normal.  Sinuses of Valsalva 4.3 cm.  Ascending aorta 4.3 cm.  The inferior vena cava cannot be assessed. No pericardial effusion is present.  No significant changes noted.     TTE 2/2023:  Global and regional left ventricular function is normal with an EF of 55-60%.  Global right ventricular function is normal.  No significant valvular abnormalities present.  Aorta dilatation present. Sinuses of Valsalva 4.5 cm. Ascending aorta 4.3 cm. No pericardial effusion is present.    ASSESSMENT AND PLAN:  In summary, patient is a 84 year old history of hypertension hyperlipidemia and paroxysmal atrial fibrillation on apixaban who presented to the cardiology clinic to follow up.    Patient remains in atrial fibrillation however he is heart rate is well controlled.  His blood pressure is also pretty well controlled at this time.  He has no complications from apixaban use tolerated well.    Feeling well no new complaints or issues.  No palpitations blood pressure is a little bit elevated here in clinic today however it is always in the normal range at home as we reviewed for the past week at least.  This might be related to the A-fib and when the measurement is taken.  As such we are not going to make any medication changes.  Will see him back in 1 year with echo and labs at that time.    I appreciate the opportunity to participate in the care of Sami MORGAN Harmon . Please do not hesitate to contact me with any further questions.  I have spent a total of 40 minutes today reviewing labs, imaging studies, discussing with colleagues, face-to-face time with patient and documentation in the medical record.  The longitudinal plan of care for the diagnosis(es)/condition(s) as documented were addressed during this visit. Due to the added complexity  in care, I will continue to support Bill in the subsequent management and with ongoing continuity of care.    Sincerely,   Benji Cornelius MD  [facility] Division of Cardiology         Please do not hesitate to contact me if you have any questions/concerns.     Sincerely,     Benji Cornelius MD

## 2025-03-04 NOTE — NURSING NOTE
Chief Complaint   Patient presents with    Follow Up     Return Heart Failure- Annual f/u per pt     Vitals were taken and medications reconciled.    EDI Wheeler  12:58 PM

## 2025-03-05 ENCOUNTER — MYC MEDICAL ADVICE (OUTPATIENT)
Dept: FAMILY MEDICINE | Facility: CLINIC | Age: 85
End: 2025-03-05
Payer: MEDICARE

## 2025-03-07 ENCOUNTER — LAB (OUTPATIENT)
Dept: LAB | Facility: CLINIC | Age: 85
End: 2025-03-07
Payer: MEDICARE

## 2025-03-07 DIAGNOSIS — Z12.5 SCREENING FOR PROSTATE CANCER: ICD-10-CM

## 2025-03-07 DIAGNOSIS — E78.5 HYPERLIPIDEMIA LDL GOAL <100: ICD-10-CM

## 2025-03-07 DIAGNOSIS — N18.30 CKD (CHRONIC KIDNEY DISEASE) STAGE 3, GFR 30-59 ML/MIN (H): Primary | ICD-10-CM

## 2025-03-07 LAB
CHOLEST SERPL-MCNC: 139 MG/DL
CREAT UR-MCNC: 89.4 MG/DL
FASTING STATUS PATIENT QL REPORTED: YES
HDLC SERPL-MCNC: 54 MG/DL
LDLC SERPL CALC-MCNC: 71 MG/DL
MICROALBUMIN UR-MCNC: <12 MG/L
MICROALBUMIN/CREAT UR: NORMAL MG/G{CREAT}
NONHDLC SERPL-MCNC: 85 MG/DL
PSA SERPL DL<=0.01 NG/ML-MCNC: 3.96 NG/ML
TRIGL SERPL-MCNC: 68 MG/DL

## 2025-03-07 PROCEDURE — 82043 UR ALBUMIN QUANTITATIVE: CPT

## 2025-03-07 PROCEDURE — 82570 ASSAY OF URINE CREATININE: CPT

## 2025-03-07 PROCEDURE — 80061 LIPID PANEL: CPT

## 2025-03-07 PROCEDURE — G0103 PSA SCREENING: HCPCS

## 2025-03-07 PROCEDURE — 36415 COLL VENOUS BLD VENIPUNCTURE: CPT

## 2025-03-08 SDOH — HEALTH STABILITY: PHYSICAL HEALTH: ON AVERAGE, HOW MANY DAYS PER WEEK DO YOU ENGAGE IN MODERATE TO STRENUOUS EXERCISE (LIKE A BRISK WALK)?: 3 DAYS

## 2025-03-08 SDOH — HEALTH STABILITY: PHYSICAL HEALTH: ON AVERAGE, HOW MANY MINUTES DO YOU ENGAGE IN EXERCISE AT THIS LEVEL?: 30 MIN

## 2025-03-08 ASSESSMENT — SOCIAL DETERMINANTS OF HEALTH (SDOH): HOW OFTEN DO YOU GET TOGETHER WITH FRIENDS OR RELATIVES?: ONCE A WEEK

## 2025-03-13 ENCOUNTER — OFFICE VISIT (OUTPATIENT)
Dept: FAMILY MEDICINE | Facility: CLINIC | Age: 85
End: 2025-03-13
Payer: MEDICARE

## 2025-03-13 VITALS
OXYGEN SATURATION: 96 % | DIASTOLIC BLOOD PRESSURE: 78 MMHG | RESPIRATION RATE: 15 BRPM | BODY MASS INDEX: 37.97 KG/M2 | TEMPERATURE: 98.4 F | HEIGHT: 71 IN | HEART RATE: 64 BPM | WEIGHT: 271.2 LBS | SYSTOLIC BLOOD PRESSURE: 130 MMHG

## 2025-03-13 DIAGNOSIS — H35.3211 EXUDATIVE AGE-RELATED MACULAR DEGENERATION OF RIGHT EYE WITH ACTIVE CHOROIDAL NEOVASCULARIZATION (H): ICD-10-CM

## 2025-03-13 DIAGNOSIS — E03.9 ACQUIRED HYPOTHYROIDISM: ICD-10-CM

## 2025-03-13 DIAGNOSIS — N18.31 STAGE 3A CHRONIC KIDNEY DISEASE (H): ICD-10-CM

## 2025-03-13 DIAGNOSIS — E66.01 MORBID OBESITY (H): ICD-10-CM

## 2025-03-13 DIAGNOSIS — I10 BENIGN ESSENTIAL HYPERTENSION: ICD-10-CM

## 2025-03-13 DIAGNOSIS — Z00.00 ENCOUNTER FOR ANNUAL PHYSICAL EXAM: Primary | ICD-10-CM

## 2025-03-13 DIAGNOSIS — I48.0 PAROXYSMAL ATRIAL FIBRILLATION (H): ICD-10-CM

## 2025-03-13 DIAGNOSIS — E78.5 HYPERLIPIDEMIA LDL GOAL <100: ICD-10-CM

## 2025-03-13 RX ORDER — LEVOTHYROXINE SODIUM 200 UG/1
200 TABLET ORAL DAILY
Qty: 90 TABLET | Refills: 3 | Status: SHIPPED | OUTPATIENT
Start: 2025-03-13

## 2025-03-13 RX ORDER — LOSARTAN POTASSIUM 100 MG/1
100 TABLET ORAL DAILY
Qty: 90 TABLET | Refills: 3 | Status: CANCELLED | OUTPATIENT
Start: 2025-03-13

## 2025-03-13 RX ORDER — LOVASTATIN 40 MG/1
40 TABLET ORAL AT BEDTIME
Qty: 90 TABLET | Refills: 3 | Status: SHIPPED | OUTPATIENT
Start: 2025-03-13

## 2025-03-13 NOTE — PROGRESS NOTES
"Preventive Care Visit  [facility]  Huy Ma MD, Family Medicine  Mar 13, 2025      Assessment & Plan     Encounter for annual physical exam   Discussed diet, exercise, wellness and other preventive recommendations related to health maintenance.   Follow up as needed for acute issues.    Physical exam recommended in one year.     Fall risks precautions.  Reviewed lab results with him.    Benign essential hypertension  Continue with current medicines  Reviewed labs.    Paroxysmal atrial fibrillation (H)  Stable, rate controlled  Continue with current medicine  Follow up with Cardiology.    Hyperlipidemia LDL goal <100    - lovastatin (MEVACOR) 40 MG tablet; Take 1 tablet (40 mg) by mouth at bedtime.    Acquired hypothyroidism  Continue with current medicine  - TSH with free T4 reflex; Future  - levothyroxine (SYNTHROID/LEVOTHROID) 200 MCG tablet; Take 1 tablet (200 mcg) by mouth daily.    Morbid obesity (H)  Discussed diet and weight loss  **8    Stage 3a chronic kidney disease (H)  Stable function.    Exudative age-related macular degeneration of right eye with active choroidal neovascularization (H)  Stable, continue to follow up with Ophthalmology.    Patient has been advised of split billing requirements and indicates understanding: Yes      BMI  Estimated body mass index is 37.97 kg/m  as calculated from the following:    Height as of this encounter: 1.8 m (5' 10.87\").    Weight as of this encounter: 123 kg (271 lb 3.2 oz).   Weight management plan: Discussed healthy diet and exercise guidelines    Counseling  Appropriate preventive services were addressed with this patient via screening, questionnaire, or discussion as appropriate for fall prevention, nutrition, physical activity, Tobacco-use cessation, social engagement, weight loss and cognition.  Checklist reviewing preventive services available has been given to the patient.  Reviewed patient's diet, addressing concerns " and/or questions.   He is at risk for lack of exercise and has been provided with information to increase physical activity for the benefit of his well-being.   The patient was provided with written information regarding signs of hearing loss.       Work on weight loss  Regular exercise    Luna Rubalcava is a 84 year old, presenting for the following:  Physical  Comes for annual exam today  Reviewed his recent lab results with him.        3/13/2025     8:19 AM   Additional Questions   Roomed by sommer pena ma   Accompanied by self         3/13/2025     8:19 AM   Patient Reported Additional Medications   Patient reports taking the following new medications none         HPI       Hyperlipidemia Follow-Up    Are you regularly taking any medication or supplement to lower your cholesterol?   Yes- statin  Are you having muscle aches or other side effects that you think could be caused by your cholesterol lowering medication?  No    Hypertension Follow-up    Do you check your blood pressure regularly outside of the clinic? Yes   Are you following a low salt diet? Yes, does not put salt on anything  Are your blood pressures ever more than 140 on the top number (systolic) OR more   than 90 on the bottom number (diastolic), for example 140/90? No        Advance Care Planning  Patient has a Health Care Directive on file  Advance care planning document is on file and is current.      3/8/2025   General Health   How would you rate your overall physical health? Good   Feel stress (tense, anxious, or unable to sleep) Not at all         3/8/2025   Nutrition   Diet: Regular (no restrictions)         3/8/2025   Exercise   Days per week of moderate/strenous exercise 3 days   Average minutes spent exercising at this level 30 min         3/8/2025   Social Factors   Frequency of gathering with friends or relatives Once a week   Worry food won't last until get money to buy more No   Food not last or not have enough money for food? No    Do you have housing? (Housing is defined as stable permanent housing and does not include staying ouside in a car, in a tent, in an abandoned building, in an overnight shelter, or couch-surfing.) Yes   Are you worried about losing your housing? No   Lack of transportation? No   Unable to get utilities (heat,electricity)? No         3/8/2025   Fall Risk   Fallen 2 or more times in the past year? No    No   Trouble with walking or balance? No    No       Multiple values from one day are sorted in reverse-chronological order          3/8/2025   Activities of Daily Living- Home Safety   Needs help with the following daily activites None of the above   Safety concerns in the home None of the above         3/8/2025   Dental   Dentist two times every year? Yes         3/8/2025   Hearing Screening   Hearing concerns? (!) IT'S HARDER TO UNDERSTAND WOMEN'S VOICES THAN MEN'S VOICES.    (!) IT'S HARD TO FOLLOW A CONVERSATION IN A NOISY RESTAURANT OR CROWDED ROOM.    (!) TROUBLE UNDERSTANDING SOFT OR WHISPERED SPEECH.       Multiple values from one day are sorted in reverse-chronological order         3/8/2025   Driving Risk Screening   Patient/family members have concerns about driving No         3/8/2025   General Alertness/Fatigue Screening   Have you been more tired than usual lately? No         3/8/2025   Urinary Incontinence Screening   Bothered by leaking urine in past 6 months No           3/1/2024   TB Screening   Were you born outside of the [place]? No           Today's PHQ-2 Score:       3/12/2025     9:32 AM   PHQ-2 ( 1999 [employer])   Q1: Little interest or pleasure in doing things 0   Q2: Feeling down, depressed or hopeless 0   PHQ-2 Score 0    Q1: Little interest or pleasure in doing things Not at all   Q2: Feeling down, depressed or hopeless Not at all   PHQ-2 Score 0       Patient-reported           3/8/2025   Substance Use   Alcohol more than 3/day or more than 7/wk No   Do you have a current opioid prescription? No    How severe/bad is pain from 1 to 10? 0/10 (No Pain)   Do you use any other substances recreationally? No     Social History     Tobacco Use    Smoking status: Former     Types: Dip, chew, snus or snuff     Passive exposure: Past    Smokeless tobacco: Former     Types: Chew     Quit date: 12/12/2000    Tobacco comments:     Chewed tobacco for 30 years   Vaping Use    Vaping status: Never Used   Substance Use Topics    Alcohol use: Yes     Comment: 1 or less drink per day    Drug use: No             Reviewed and updated as needed this visit by Provider                    Past Medical History:   Diagnosis Date    Acquired hypothyroidism 12/31/2018    Arthritis 2010    Cancer (H) Summer 2023    Basal cell skin cancer    Essential hypertension, benign 2004    History of basal cell carcinoma     Macular degeneration 1980s    Paroxysmal atrial fibrillation (H) 12/31/2018    Pure hypercholesterolemia 2004    goal ldl <100     Past Surgical History:   Procedure Laterality Date    ABDOMEN SURGERY  1970's, 1980's    Two hernia surgerys    BIOPSY  07/2016    polyp removed from vocal cord - benign    CATARACT EXTRACTION, BILATERAL Bilateral 08/2023    At [facility]    COLONOSCOPY      COSMETIC SURGERY      EYE SURGERY  2000    Macular scar, presently being treated with avastin    HERNIA REPAIR, INGUINAL RT/LT  1970,1980    ORTHOPEDIC SURGERY  April 2015, June 2015    Right hip replacement, Left hip replacement    SURGICAL HISTORY OF -   2000    cholesteatoma removal L    SURGICAL HISTORY OF -   03/2004    laser macular    SURGICAL HISTORY OF -   04/2015    right hip replacement    TONSILLECTOMY & ADENOIDECTOMY  1961     OB History   No obstetric history on file.     Current providers sharing in care for this patient include:  Patient Care Team:  Huy Ma MD as PCP - General (Family Medicine)  Mona Real RN as Specialty Care Coordinator (Cardiology)  Huy Ma MD as Assigned PCP  Piyush Lopez RN as  "Specialty Care Coordinator (Cardiology)  Dana Schaffer APRN CNP as Nurse Practitioner (Dermatology)  Benji Cornelius MD as Assigned Heart and Vascular Provider  Gilma Griffin PA-C as Assigned Surgical Provider    The following health maintenance items are reviewed in Epic and correct as of today:  Health Maintenance   Topic Date Due    MEDICARE ANNUAL WELLNESS VISIT  03/08/2025    COVID-19 Vaccine (9 - 2024-25 season) 04/01/2025    ALT  03/04/2026    BMP  03/04/2026    CBC  03/04/2026    HEMOGLOBIN  03/04/2026    LIPID  03/07/2026    MICROALBUMIN  03/07/2026    PSA  03/07/2026    ANNUAL REVIEW OF HM ORDERS  03/13/2026    FALL RISK ASSESSMENT  03/13/2026    HF ACTION PLAN  03/08/2027    ADVANCE CARE PLANNING  03/08/2029    DTAP/TDAP/TD IMMUNIZATION (3 - Td or Tdap) 03/20/2033    TSH W/FREE T4 REFLEX  Completed    PHQ-2 (once per calendar year)  Completed    INFLUENZA VACCINE  Completed    Pneumococcal Vaccine: 50+ Years  Completed    URINALYSIS  Completed    ZOSTER IMMUNIZATION  Completed    RSV VACCINE  Completed    HPV IMMUNIZATION  Aged Out    MENINGITIS IMMUNIZATION  Aged Out    COLORECTAL CANCER SCREENING  Discontinued         Review of Systems  Constitutional, HEENT, cardiovascular, pulmonary, GI, , musculoskeletal, neuro, skin, endocrine and psych systems are negative, except as otherwise noted.     Objective    Exam  BP (!) 163/76 (BP Location: Right arm, Patient Position: Sitting, Cuff Size: Adult Large)   Pulse 64   Temp 98.4  F (36.9  C) (Oral)   Resp 15   Ht 1.8 m (5' 10.87\")   Wt 123 kg (271 lb 3.2 oz)   SpO2 96%   BMI 37.97 kg/m     Estimated body mass index is 37.97 kg/m  as calculated from the following:    Height as of this encounter: 1.8 m (5' 10.87\").    Weight as of this encounter: 123 kg (271 lb 3.2 oz).    Physical Exam  GENERAL: alert and no distress  EYES: Eyes grossly normal to inspection, PERRL and conjunctivae and sclerae normal  HENT: ear canals and TM's normal, nose " and mouth without ulcers or lesions  NECK: no adenopathy, no asymmetry, masses, or scars  RESP: lungs clear to auscultation - no rales, rhonchi or wheezes  CV: regular rate and rhythm, normal S1 S2, no S3 or S4, no murmur, click or rub, no peripheral edema  ABDOMEN: soft, nontender, no hepatosplenomegaly, no masses and bowel sounds normal  MS: no gross musculoskeletal defects noted, no edema  SKIN: no suspicious lesions or rashes  NEURO: Normal strength and tone, mentation intact and speech normal  PSYCH: mentation appears normal, affect normal/bright     Orders Placed This Encounter   Procedures    REVIEW OF HEALTH MAINTENANCE PROTOCOL ORDERS    TSH with free T4 reflex      PSA   Date Value Ref Range Status   06/27/2019 0.96 0 - 4 ug/L Final     Comment:     Assay Method:  Chemiluminescence using Siemens Vista analyzer     Prostate Specific Antigen Screen   Date Value Ref Range Status   03/07/2025 3.96 ng/mL Final     Comment:     No reference ranges have been established for patients over 80 years.   02/17/2022 2.62 0.00 - 4.00 ug/L Final       Reviewed lab results        3/13/2025   Mini Cog   Clock Draw Score 2 Normal   3 Item Recall 3 objects recalled   Mini Cog Total Score 5              Signed Electronically by: Huy Ma MD

## 2025-03-20 ENCOUNTER — TRANSFERRED RECORDS (OUTPATIENT)
Dept: HEALTH INFORMATION MANAGEMENT | Facility: CLINIC | Age: 85
End: 2025-03-20
Payer: MEDICARE

## 2025-03-20 ENCOUNTER — MYC MEDICAL ADVICE (OUTPATIENT)
Dept: CARDIOLOGY | Facility: CLINIC | Age: 85
End: 2025-03-20
Payer: MEDICARE

## 2025-03-20 DIAGNOSIS — E78.2 MIXED HYPERLIPIDEMIA: ICD-10-CM

## 2025-03-20 DIAGNOSIS — I10 BENIGN ESSENTIAL HYPERTENSION: ICD-10-CM

## 2025-03-20 RX ORDER — METOPROLOL SUCCINATE 100 MG/1
150 TABLET, EXTENDED RELEASE ORAL DAILY
Qty: 135 TABLET | Refills: 2 | Status: SHIPPED | OUTPATIENT
Start: 2025-03-20

## 2025-03-25 RX ORDER — METOPROLOL SUCCINATE 100 MG/1
150 TABLET, EXTENDED RELEASE ORAL DAILY
Qty: 135 TABLET | Refills: 0 | OUTPATIENT
Start: 2025-03-25

## 2025-03-25 NOTE — TELEPHONE ENCOUNTER
metoprolol succinate ER (TOPROL XL) 100 MG 24 hr tablet     The original prescription was reordered on 3/20/2025 by Piyush Lopez RN   Addressed in a different encounter.

## 2025-03-30 ENCOUNTER — HEALTH MAINTENANCE LETTER (OUTPATIENT)
Age: 85
End: 2025-03-30

## 2025-04-23 ENCOUNTER — MYC MEDICAL ADVICE (OUTPATIENT)
Dept: FAMILY MEDICINE | Facility: CLINIC | Age: 85
End: 2025-04-23
Payer: MEDICARE

## 2025-04-23 DIAGNOSIS — I10 HYPERTENSION GOAL BP (BLOOD PRESSURE) < 140/90: ICD-10-CM

## 2025-04-23 DIAGNOSIS — E03.9 ACQUIRED HYPOTHYROIDISM: ICD-10-CM

## 2025-04-23 RX ORDER — LEVOTHYROXINE SODIUM 200 UG/1
200 TABLET ORAL DAILY
Qty: 90 TABLET | Refills: 2 | Status: SHIPPED | OUTPATIENT
Start: 2025-04-23

## 2025-04-23 RX ORDER — LOSARTAN POTASSIUM 100 MG/1
100 TABLET ORAL DAILY
Qty: 90 TABLET | Refills: 2 | Status: SHIPPED | OUTPATIENT
Start: 2025-04-23

## 2025-04-23 RX ORDER — LOSARTAN POTASSIUM 100 MG/1
100 TABLET ORAL DAILY
Qty: 90 TABLET | Refills: 3 | OUTPATIENT
Start: 2025-04-23

## 2025-04-23 RX ORDER — LEVOTHYROXINE SODIUM 200 UG/1
200 TABLET ORAL DAILY
Qty: 90 TABLET | Refills: 3 | OUTPATIENT
Start: 2025-04-23